# Patient Record
Sex: FEMALE | Race: WHITE | NOT HISPANIC OR LATINO | Employment: OTHER | URBAN - METROPOLITAN AREA
[De-identification: names, ages, dates, MRNs, and addresses within clinical notes are randomized per-mention and may not be internally consistent; named-entity substitution may affect disease eponyms.]

---

## 2018-11-27 ENCOUNTER — TRANSCRIBE ORDERS (OUTPATIENT)
Dept: ADMINISTRATIVE | Facility: HOSPITAL | Age: 63
End: 2018-11-27

## 2018-11-27 DIAGNOSIS — Z12.39 SCREENING BREAST EXAMINATION: Primary | ICD-10-CM

## 2018-12-05 ENCOUNTER — HOSPITAL ENCOUNTER (OUTPATIENT)
Dept: MAMMOGRAPHY | Facility: HOSPITAL | Age: 63
Discharge: HOME | End: 2018-12-05
Attending: FAMILY MEDICINE
Payer: COMMERCIAL

## 2018-12-05 DIAGNOSIS — Z12.39 SCREENING BREAST EXAMINATION: ICD-10-CM

## 2018-12-05 PROCEDURE — 77063 BREAST TOMOSYNTHESIS BI: CPT

## 2018-12-05 PROCEDURE — 77067 SCR MAMMO BI INCL CAD: CPT

## 2019-06-17 ENCOUNTER — TRANSCRIBE ORDERS (OUTPATIENT)
Dept: LAB | Facility: MEDICAL CENTER | Age: 64
End: 2019-06-17

## 2019-06-17 ENCOUNTER — APPOINTMENT (OUTPATIENT)
Dept: LAB | Facility: MEDICAL CENTER | Age: 64
End: 2019-06-17
Payer: COMMERCIAL

## 2019-06-17 DIAGNOSIS — E07.9 THYROID DYSFUNCTION: ICD-10-CM

## 2019-06-17 DIAGNOSIS — E07.9 THYROID DYSFUNCTION: Primary | ICD-10-CM

## 2019-06-17 LAB
T4 FREE SERPL-MCNC: 1.21 NG/DL (ref 0.76–1.46)
TSH SERPL DL<=0.05 MIU/L-ACNC: 0.73 UIU/ML (ref 0.36–3.74)

## 2019-06-17 PROCEDURE — 84443 ASSAY THYROID STIM HORMONE: CPT

## 2019-06-17 PROCEDURE — 84439 ASSAY OF FREE THYROXINE: CPT

## 2019-06-17 PROCEDURE — 36415 COLL VENOUS BLD VENIPUNCTURE: CPT

## 2019-11-18 ENCOUNTER — TRANSCRIBE ORDERS (OUTPATIENT)
Dept: ADMINISTRATIVE | Facility: HOSPITAL | Age: 64
End: 2019-11-18

## 2019-11-18 DIAGNOSIS — Z13.820 SCREENING FOR OSTEOPOROSIS: ICD-10-CM

## 2019-11-18 DIAGNOSIS — Z12.31 SCREENING MAMMOGRAM FOR HIGH-RISK PATIENT: Primary | ICD-10-CM

## 2019-12-09 ENCOUNTER — HOSPITAL ENCOUNTER (OUTPATIENT)
Dept: MAMMOGRAPHY | Facility: HOSPITAL | Age: 64
Discharge: HOME | End: 2019-12-09
Attending: FAMILY MEDICINE
Payer: MEDICARE

## 2019-12-09 ENCOUNTER — HOSPITAL ENCOUNTER (OUTPATIENT)
Dept: BONE DENSITY | Facility: HOSPITAL | Age: 64
Discharge: HOME | End: 2019-12-09
Attending: FAMILY MEDICINE
Payer: MEDICARE

## 2019-12-09 VITALS — HEIGHT: 64 IN | WEIGHT: 140 LBS | BODY MASS INDEX: 23.9 KG/M2

## 2019-12-09 DIAGNOSIS — Z13.820 SCREENING FOR OSTEOPOROSIS: ICD-10-CM

## 2019-12-09 DIAGNOSIS — Z12.31 SCREENING MAMMOGRAM FOR HIGH-RISK PATIENT: ICD-10-CM

## 2019-12-09 PROCEDURE — 77067 SCR MAMMO BI INCL CAD: CPT

## 2019-12-09 PROCEDURE — 77063 BREAST TOMOSYNTHESIS BI: CPT

## 2019-12-09 PROCEDURE — 77080 DXA BONE DENSITY AXIAL: CPT

## 2020-12-11 ENCOUNTER — TRANSCRIBE ORDERS (OUTPATIENT)
Dept: ADMINISTRATIVE | Facility: HOSPITAL | Age: 65
End: 2020-12-11

## 2020-12-11 DIAGNOSIS — Z12.31 ENCOUNTER FOR SCREENING MAMMOGRAM FOR MALIGNANT NEOPLASM OF BREAST: Primary | ICD-10-CM

## 2020-12-15 ENCOUNTER — HOSPITAL ENCOUNTER (OUTPATIENT)
Dept: MAMMOGRAPHY | Facility: HOSPITAL | Age: 65
Discharge: HOME | End: 2020-12-15
Attending: FAMILY MEDICINE
Payer: MEDICARE

## 2020-12-15 VITALS — WEIGHT: 142 LBS | BODY MASS INDEX: 24.24 KG/M2 | HEIGHT: 64 IN

## 2020-12-15 DIAGNOSIS — Z12.31 ENCOUNTER FOR SCREENING MAMMOGRAM FOR MALIGNANT NEOPLASM OF BREAST: ICD-10-CM

## 2020-12-15 PROCEDURE — 77067 SCR MAMMO BI INCL CAD: CPT

## 2020-12-15 PROCEDURE — 77063 BREAST TOMOSYNTHESIS BI: CPT

## 2021-07-14 PROCEDURE — U0005 INFEC AGEN DETEC AMPLI PROBE: HCPCS | Performed by: NURSE PRACTITIONER

## 2021-07-14 PROCEDURE — U0003 INFECTIOUS AGENT DETECTION BY NUCLEIC ACID (DNA OR RNA); SEVERE ACUTE RESPIRATORY SYNDROME CORONAVIRUS 2 (SARS-COV-2) (CORONAVIRUS DISEASE [COVID-19]), AMPLIFIED PROBE TECHNIQUE, MAKING USE OF HIGH THROUGHPUT TECHNOLOGIES AS DESCRIBED BY CMS-2020-01-R: HCPCS | Performed by: NURSE PRACTITIONER

## 2021-11-18 PROCEDURE — U0005 INFEC AGEN DETEC AMPLI PROBE: HCPCS | Performed by: FAMILY MEDICINE

## 2021-11-18 PROCEDURE — U0003 INFECTIOUS AGENT DETECTION BY NUCLEIC ACID (DNA OR RNA); SEVERE ACUTE RESPIRATORY SYNDROME CORONAVIRUS 2 (SARS-COV-2) (CORONAVIRUS DISEASE [COVID-19]), AMPLIFIED PROBE TECHNIQUE, MAKING USE OF HIGH THROUGHPUT TECHNOLOGIES AS DESCRIBED BY CMS-2020-01-R: HCPCS | Performed by: FAMILY MEDICINE

## 2021-12-20 ENCOUNTER — HOSPITAL ENCOUNTER (OUTPATIENT)
Dept: RADIOLOGY | Facility: HOSPITAL | Age: 66
Discharge: HOME | End: 2021-12-20
Attending: FAMILY MEDICINE
Payer: MEDICARE

## 2021-12-20 ENCOUNTER — HOSPITAL ENCOUNTER (OUTPATIENT)
Dept: MAMMOGRAPHY | Facility: HOSPITAL | Age: 66
Discharge: HOME | End: 2021-12-20
Attending: FAMILY MEDICINE
Payer: MEDICARE

## 2021-12-20 ENCOUNTER — HOSPITAL ENCOUNTER (OUTPATIENT)
Dept: BONE DENSITY | Facility: HOSPITAL | Age: 66
Discharge: HOME | End: 2021-12-20
Attending: FAMILY MEDICINE
Payer: MEDICARE

## 2021-12-20 VITALS — BODY MASS INDEX: 23.9 KG/M2 | WEIGHT: 140 LBS | HEIGHT: 64 IN

## 2021-12-20 DIAGNOSIS — Z13.820 OSTEOPOROSIS SCREENING: ICD-10-CM

## 2021-12-20 DIAGNOSIS — M46.1 BILATERAL SACROILIITIS (HCC): ICD-10-CM

## 2021-12-20 DIAGNOSIS — Z12.31 ENCOUNTER FOR SCREENING MAMMOGRAM FOR MALIGNANT NEOPLASM OF BREAST: ICD-10-CM

## 2021-12-20 PROCEDURE — 72202 X-RAY EXAM SI JOINTS 3/> VWS: CPT

## 2021-12-20 PROCEDURE — 72110 X-RAY EXAM L-2 SPINE 4/>VWS: CPT

## 2021-12-20 PROCEDURE — 77067 SCR MAMMO BI INCL CAD: CPT

## 2021-12-20 PROCEDURE — 77080 DXA BONE DENSITY AXIAL: CPT

## 2021-12-20 PROCEDURE — 77063 BREAST TOMOSYNTHESIS BI: CPT

## 2022-07-26 ENCOUNTER — OFFICE VISIT (OUTPATIENT)
Dept: URGENT CARE | Facility: MEDICAL CENTER | Age: 67
End: 2022-07-26
Payer: MEDICARE

## 2022-07-26 VITALS
TEMPERATURE: 98.6 F | OXYGEN SATURATION: 100 % | HEIGHT: 64 IN | SYSTOLIC BLOOD PRESSURE: 130 MMHG | BODY MASS INDEX: 24.41 KG/M2 | DIASTOLIC BLOOD PRESSURE: 70 MMHG | RESPIRATION RATE: 18 BRPM | WEIGHT: 143 LBS | HEART RATE: 79 BPM

## 2022-07-26 DIAGNOSIS — N39.0 URINARY TRACT INFECTION WITHOUT HEMATURIA, SITE UNSPECIFIED: Primary | ICD-10-CM

## 2022-07-26 DIAGNOSIS — R39.9 UTI SYMPTOMS: ICD-10-CM

## 2022-07-26 LAB
SL AMB  POCT GLUCOSE, UA: ABNORMAL
SL AMB LEUKOCYTE ESTERASE,UA: ABNORMAL
SL AMB POCT BILIRUBIN,UA: ABNORMAL
SL AMB POCT BLOOD,UA: ABNORMAL
SL AMB POCT CLARITY,UA: ABNORMAL
SL AMB POCT COLOR,UA: YELLOW
SL AMB POCT KETONES,UA: ABNORMAL
SL AMB POCT NITRITE,UA: ABNORMAL
SL AMB POCT PH,UA: 6.5
SL AMB POCT SPECIFIC GRAVITY,UA: 1
SL AMB POCT URINE PROTEIN: ABNORMAL
SL AMB POCT UROBILINOGEN: 0.2

## 2022-07-26 PROCEDURE — 81002 URINALYSIS NONAUTO W/O SCOPE: CPT | Performed by: PHYSICIAN ASSISTANT

## 2022-07-26 PROCEDURE — G0463 HOSPITAL OUTPT CLINIC VISIT: HCPCS | Performed by: PHYSICIAN ASSISTANT

## 2022-07-26 PROCEDURE — 87086 URINE CULTURE/COLONY COUNT: CPT | Performed by: PHYSICIAN ASSISTANT

## 2022-07-26 PROCEDURE — 99212 OFFICE O/P EST SF 10 MIN: CPT | Performed by: PHYSICIAN ASSISTANT

## 2022-07-26 RX ORDER — LEVOTHYROXINE SODIUM 0.1 MG/1
TABLET ORAL
COMMUNITY
Start: 2022-06-21

## 2022-07-26 RX ORDER — LISINOPRIL 10 MG/1
10 TABLET ORAL DAILY
COMMUNITY

## 2022-07-26 RX ORDER — CEPHALEXIN 500 MG/1
500 CAPSULE ORAL EVERY 8 HOURS SCHEDULED
Qty: 21 CAPSULE | Refills: 0 | Status: SHIPPED | OUTPATIENT
Start: 2022-07-26 | End: 2022-08-02

## 2022-07-26 RX ORDER — ATENOLOL 50 MG/1
50 TABLET ORAL 2 TIMES DAILY
COMMUNITY

## 2022-07-26 NOTE — PROGRESS NOTES
[facility]        NAME: Smith Barnhart is a 75 y o  female  : 1955    MRN: [id number]  DATE: 2022  TIME: 7:09 PM    Assessment and Plan   Urinary tract infection without hematuria, site unspecified [N39 0]  1  Urinary tract infection without hematuria, site unspecified  cephalexin (KEFLEX) 500 mg capsule   2  UTI symptoms  POCT urine dip    Urine culture         Patient Instructions       Follow up with PCP in 3-5 days  Proceed to  ER if symptoms worsen  Chief Complaint     Chief Complaint   Patient presents with    Possible UTI     Pressure and frequency that started yesterday          History of Present Illness       Patient started with urinary pressure and frequency which started last night  Symptoms worsened today  No flank pain abdominal pain nausea vomiting fever or chills  She has never had a UTI in the past       Review of Systems   Review of Systems   Constitutional: Negative for chills and fever  Gastrointestinal: Negative for abdominal pain  Genitourinary: Positive for dysuria (Pressure), frequency and urgency  Negative for flank pain  Musculoskeletal: Negative for back pain           Current Medications       Current Outpatient Medications:     atenolol (TENORMIN) 50 mg tablet, Take 50 mg by mouth 2 (two) times a day, Disp: , Rfl:     cephalexin (KEFLEX) 500 mg capsule, Take 1 capsule (500 mg total) by mouth every 8 (eight) hours for 7 days, Disp: 21 capsule, Rfl: 0    levothyroxine 100 mcg tablet, take 1 tablet by mouth 6 days  PER WEEK, 1/2 TABELT ONE DAY A WEEK, Disp: , Rfl:     lisinopril (ZESTRIL) 10 mg tablet, Take 10 mg by mouth daily, Disp: , Rfl:     Current Allergies     Allergies as of 2022 - Reviewed 2022   Allergen Reaction Noted    Ciprofloxacin Swelling 2019    Erythromycin  2019    Penicillins Swelling 2019            The following portions of the patient's history were reviewed and updated as appropriate: allergies, current medications, past family history, past medical history, past social history, past surgical history and problem list      Past Medical History:   Diagnosis Date    Disease of thyroid gland     Hypertension        Past Surgical History:   Procedure Laterality Date    HYSTERECTOMY         Family History   Problem Relation Age of Onset    Acute lymphoblastic leukemia Mother     Lung cancer Father     No Known Problems Daughter     No Known Problems Maternal Grandmother     No Known Problems Paternal Grandmother     No Known Problems Maternal Aunt     No Known Problems Paternal Aunt     Breast cancer Cousin     No Known Problems Maternal Grandfather     No Known Problems Paternal Grandfather     No Known Problems Son          Medications have been verified  Objective   /70   Pulse 79   Temp 98 6 °F (37 °C)   Resp 18   Ht 5' 4" (1 626 m)   Wt 64 9 kg (143 lb)   SpO2 100%   BMI 24 55 kg/m²   No LMP recorded  Patient has had a hysterectomy  Physical Exam     Physical Exam  Vitals and nursing note reviewed  Constitutional:       Appearance: Normal appearance  HENT:      Head: Normocephalic and atraumatic  Cardiovascular:      Rate and Rhythm: Normal rate and regular rhythm  Pulmonary:      Effort: Pulmonary effort is normal    Skin:     General: Skin is warm  Neurological:      Mental Status: She is alert

## 2022-07-28 LAB — BACTERIA UR CULT: NORMAL

## 2022-08-01 ENCOUNTER — TELEPHONE (OUTPATIENT)
Dept: URGENT CARE | Facility: MEDICAL CENTER | Age: 67
End: 2022-08-01

## 2022-08-01 NOTE — TELEPHONE ENCOUNTER
Patient called and left a message requesting urine culture results  Called patient and left a message

## 2022-08-01 NOTE — TELEPHONE ENCOUNTER
Patient came to urgent care to review urine culture results  Discussed with patient  Recommended completing course based on symptoms  States symptoms are improving

## 2022-09-20 ENCOUNTER — OFFICE VISIT (OUTPATIENT)
Dept: URGENT CARE | Facility: MEDICAL CENTER | Age: 67
End: 2022-09-20
Payer: MEDICARE

## 2022-09-20 VITALS
RESPIRATION RATE: 18 BRPM | OXYGEN SATURATION: 99 % | BODY MASS INDEX: 24.41 KG/M2 | TEMPERATURE: 97.9 F | HEIGHT: 64 IN | HEART RATE: 53 BPM | WEIGHT: 143 LBS

## 2022-09-20 DIAGNOSIS — R05.1 ACUTE COUGH: ICD-10-CM

## 2022-09-20 DIAGNOSIS — U07.1 COVID-19: Primary | ICD-10-CM

## 2022-09-20 LAB
SARS-COV-2 AG UPPER RESP QL IA: POSITIVE
VALID CONTROL: ABNORMAL

## 2022-09-20 PROCEDURE — G0463 HOSPITAL OUTPT CLINIC VISIT: HCPCS | Performed by: PHYSICIAN ASSISTANT

## 2022-09-20 PROCEDURE — 87811 SARS-COV-2 COVID19 W/OPTIC: CPT | Performed by: PHYSICIAN ASSISTANT

## 2022-09-20 PROCEDURE — 99212 OFFICE O/P EST SF 10 MIN: CPT | Performed by: PHYSICIAN ASSISTANT

## 2022-09-20 RX ORDER — BENZONATATE 200 MG/1
200 CAPSULE ORAL 3 TIMES DAILY PRN
Qty: 20 CAPSULE | Refills: 0 | Status: SHIPPED | OUTPATIENT
Start: 2022-09-20

## 2022-09-20 NOTE — PATIENT INSTRUCTIONS
Quarantine  Vitamin D3 2000 International Units daily  Rest  Drink plenty of fluids  Follow-up with family doctor  If symptoms worsen go to the emergency room for further evaluation

## 2022-09-20 NOTE — PROGRESS NOTES
[facility]        NAME: Huyen Vides is a 75 y o  female  : 1955    MRN: [id number]  DATE: 2022  TIME: 6:47 PM    Assessment and Plan   COVID-19 [U07 1]  1  COVID-19  benzonatate (TESSALON) 200 MG capsule   2  Acute cough  Poct Covid 19 Rapid Antigen Test         Patient Instructions       Follow up with PCP in 3-5 days  Proceed to  ER if symptoms worsen  Chief Complaint     Chief Complaint   Patient presents with    COVID-19     Covid positive took home test today , cough x 1 week and fatigue           History of Present Illness       Patient presents a one-week history of cough and fatigue  She did home COVID test which was positive today  Symptoms have improved the last few days  She is vaccinated for COVID   tested he was negative  Patient is fully vaccinated      Review of Systems   Review of Systems   Constitutional: Positive for fatigue  Negative for fever  HENT: Positive for congestion  Negative for rhinorrhea and sore throat  Respiratory: Positive for cough  Negative for shortness of breath  Gastrointestinal: Negative for diarrhea, nausea and vomiting  Musculoskeletal: Positive for myalgias  Skin: Negative for rash  Neurological: Positive for headaches           Current Medications       Current Outpatient Medications:     atenolol (TENORMIN) 50 mg tablet, Take 50 mg by mouth 2 (two) times a day, Disp: , Rfl:     benzonatate (TESSALON) 200 MG capsule, Take 1 capsule (200 mg total) by mouth 3 (three) times a day as needed for cough, Disp: 20 capsule, Rfl: 0    levothyroxine 100 mcg tablet, take 1 tablet by mouth 6 days  PER WEEK, 1/2 TABELT ONE DAY A WEEK, Disp: , Rfl:     lisinopril (ZESTRIL) 10 mg tablet, Take 10 mg by mouth daily, Disp: , Rfl:     Current Allergies     Allergies as of 2022 - Reviewed 2022   Allergen Reaction Noted    Ciprofloxacin Swelling 2019    Erythromycin  2019    Penicillins Swelling 12/09/2019            The following portions of the patient's history were reviewed and updated as appropriate: allergies, current medications, past family history, past medical history, past social history, past surgical history and problem list      Past Medical History:   Diagnosis Date    Disease of thyroid gland     Hypertension        Past Surgical History:   Procedure Laterality Date    HYSTERECTOMY         Family History   Problem Relation Age of Onset    Acute lymphoblastic leukemia Mother     Lung cancer Father     No Known Problems Daughter     No Known Problems Maternal Grandmother     No Known Problems Paternal Grandmother     No Known Problems Maternal Aunt     No Known Problems Paternal Aunt     Breast cancer Cousin     No Known Problems Maternal Grandfather     No Known Problems Paternal Grandfather     No Known Problems Son          Medications have been verified  Objective   Pulse (!) 53   Temp 97 9 °F (36 6 °C)   Resp 18   Ht 5' 4" (1 626 m)   Wt 64 9 kg (143 lb)   SpO2 99%   BMI 24 55 kg/m²   No LMP recorded  Patient has had a hysterectomy  Physical Exam     Physical Exam  Vitals and nursing note reviewed  Constitutional:       Appearance: Normal appearance  HENT:      Head: Normocephalic and atraumatic  Right Ear: Tympanic membrane normal       Left Ear: Tympanic membrane normal       Mouth/Throat:      Mouth: Mucous membranes are moist       Pharynx: Oropharynx is clear  Cardiovascular:      Rate and Rhythm: Normal rate and regular rhythm  Heart sounds: Normal heart sounds  Pulmonary:      Effort: Pulmonary effort is normal       Breath sounds: Normal breath sounds  Skin:     General: Skin is warm  Neurological:      Mental Status: She is alert

## 2022-12-23 ENCOUNTER — HOSPITAL ENCOUNTER (OUTPATIENT)
Dept: MAMMOGRAPHY | Facility: HOSPITAL | Age: 67
End: 2022-12-23
Attending: FAMILY MEDICINE

## 2022-12-23 VITALS — BODY MASS INDEX: 24.41 KG/M2 | WEIGHT: 143 LBS | HEIGHT: 64 IN

## 2022-12-23 DIAGNOSIS — Z12.31 ENCOUNTER FOR SCREENING MAMMOGRAM FOR MALIGNANT NEOPLASM OF BREAST: ICD-10-CM

## 2023-01-09 ENCOUNTER — APPOINTMENT (OUTPATIENT)
Dept: RADIOLOGY | Facility: CLINIC | Age: 68
End: 2023-01-09

## 2023-01-09 ENCOUNTER — APPOINTMENT (OUTPATIENT)
Dept: LAB | Facility: MEDICAL CENTER | Age: 68
End: 2023-01-09

## 2023-01-09 DIAGNOSIS — Z86.39 HISTORY OF HYPOKALEMIA: ICD-10-CM

## 2023-01-09 DIAGNOSIS — R22.42 LOCALIZED SWELLING OF LEFT FOOT: ICD-10-CM

## 2023-01-09 DIAGNOSIS — M79.672 LEFT FOOT PAIN: ICD-10-CM

## 2023-01-09 LAB
ANION GAP SERPL CALCULATED.3IONS-SCNC: 6 MMOL/L (ref 4–13)
B BURGDOR IGG+IGM SER-ACNC: <0.2 AI
BASOPHILS # BLD AUTO: 0.05 THOUSANDS/ÂΜL (ref 0–0.1)
BASOPHILS NFR BLD AUTO: 1 % (ref 0–1)
BUN SERPL-MCNC: 17 MG/DL (ref 5–25)
CALCIUM SERPL-MCNC: 9.9 MG/DL (ref 8.3–10.1)
CHLORIDE SERPL-SCNC: 106 MMOL/L (ref 96–108)
CO2 SERPL-SCNC: 27 MMOL/L (ref 21–32)
CREAT SERPL-MCNC: 1.03 MG/DL (ref 0.6–1.3)
CRP SERPL QL: 11.5 MG/L
EOSINOPHIL # BLD AUTO: 0.16 THOUSAND/ÂΜL (ref 0–0.61)
EOSINOPHIL NFR BLD AUTO: 3 % (ref 0–6)
ERYTHROCYTE [DISTWIDTH] IN BLOOD BY AUTOMATED COUNT: 14.1 % (ref 11.6–15.1)
ERYTHROCYTE [SEDIMENTATION RATE] IN BLOOD: 9 MM/HOUR (ref 0–29)
GFR SERPL CREATININE-BSD FRML MDRD: 55 ML/MIN/1.73SQ M
GLUCOSE P FAST SERPL-MCNC: 109 MG/DL (ref 65–99)
HCT VFR BLD AUTO: 37.4 % (ref 34.8–46.1)
HGB BLD-MCNC: 13.3 G/DL (ref 11.5–15.4)
IMM GRANULOCYTES # BLD AUTO: 0.02 THOUSAND/UL (ref 0–0.2)
IMM GRANULOCYTES NFR BLD AUTO: 0 % (ref 0–2)
LYMPHOCYTES # BLD AUTO: 1.85 THOUSANDS/ÂΜL (ref 0.6–4.47)
LYMPHOCYTES NFR BLD AUTO: 31 % (ref 14–44)
MCH RBC QN AUTO: 34.6 PG (ref 26.8–34.3)
MCHC RBC AUTO-ENTMCNC: 35.6 G/DL (ref 31.4–37.4)
MCV RBC AUTO: 97 FL (ref 82–98)
MONOCYTES # BLD AUTO: 0.39 THOUSAND/ÂΜL (ref 0.17–1.22)
MONOCYTES NFR BLD AUTO: 7 % (ref 4–12)
NEUTROPHILS # BLD AUTO: 3.43 THOUSANDS/ÂΜL (ref 1.85–7.62)
NEUTS SEG NFR BLD AUTO: 58 % (ref 43–75)
NRBC BLD AUTO-RTO: 0 /100 WBCS
PLATELET # BLD AUTO: 82 THOUSANDS/UL (ref 149–390)
PMV BLD AUTO: 12.4 FL (ref 8.9–12.7)
POTASSIUM SERPL-SCNC: 4.4 MMOL/L (ref 3.5–5.3)
RBC # BLD AUTO: 3.84 MILLION/UL (ref 3.81–5.12)
SODIUM SERPL-SCNC: 139 MMOL/L (ref 135–147)
URATE SERPL-MCNC: 8.5 MG/DL (ref 2–7.5)
WBC # BLD AUTO: 5.9 THOUSAND/UL (ref 4.31–10.16)

## 2023-03-02 ENCOUNTER — OFFICE VISIT (OUTPATIENT)
Dept: URGENT CARE | Facility: MEDICAL CENTER | Age: 68
End: 2023-03-02

## 2023-03-02 VITALS
HEIGHT: 63 IN | TEMPERATURE: 97.7 F | WEIGHT: 139.2 LBS | OXYGEN SATURATION: 98 % | RESPIRATION RATE: 18 BRPM | BODY MASS INDEX: 24.66 KG/M2 | DIASTOLIC BLOOD PRESSURE: 82 MMHG | HEART RATE: 62 BPM | SYSTOLIC BLOOD PRESSURE: 130 MMHG

## 2023-03-02 DIAGNOSIS — R05.1 ACUTE COUGH: ICD-10-CM

## 2023-03-02 DIAGNOSIS — J01.90 ACUTE SINUSITIS, RECURRENCE NOT SPECIFIED, UNSPECIFIED LOCATION: Primary | ICD-10-CM

## 2023-03-02 RX ORDER — BENZONATATE 200 MG/1
200 CAPSULE ORAL 3 TIMES DAILY PRN
Qty: 20 CAPSULE | Refills: 0 | Status: SHIPPED | OUTPATIENT
Start: 2023-03-02

## 2023-03-02 RX ORDER — METHYLPREDNISOLONE 4 MG/1
TABLET ORAL
Qty: 1 EACH | Refills: 0 | Status: SHIPPED | OUTPATIENT
Start: 2023-03-02 | End: 2023-03-02

## 2023-03-02 RX ORDER — FLUTICASONE PROPIONATE 50 MCG
1 SPRAY, SUSPENSION (ML) NASAL DAILY
Qty: 11.1 ML | Refills: 0 | Status: SHIPPED | OUTPATIENT
Start: 2023-03-02

## 2023-03-02 RX ORDER — METHYLPREDNISOLONE 4 MG/1
TABLET ORAL
Qty: 21 TABLET | Refills: 0 | Status: SHIPPED | OUTPATIENT
Start: 2023-03-02 | End: 2023-03-08

## 2023-03-02 NOTE — PATIENT INSTRUCTIONS
You make take Over the Counter Tylenol (Acetaminophen) and/or Motrin (Ibuprofen) as needed, as directed on packaging  Be sure to get plenty of rest, and drinking fluids to remain hydrated  For your sore throat you can use over the counter Chloraseptic Spray & salt water gargles for pain relief  Over the counter decongestants can be used, only as directed on the box  However if you have any history of cardiac disease or high blood pressure these should be avoided, as we caution the use of these since they can make place strain on your heart and cause increase in blood pressure  It is recommended in lieu of decongestants to use cool mist vaporizer, saline nasal spray to relieve nasal congestion  It is also important to remain hydrated and drink plenty of fluids (avoiding caffeine and alcohol)  The unnecessary use of antibiotics can have harmful affect, unwanted side-effects and can lead to antibiotic resistant bacteria in the future  You are being treated today for a viral illness  Viral illnesses do not require antibiotics, and are treated symptomatically  According to the Centers for Disease Control and Prevention, about one-third of antibiotic use in the outpatient setting, is not needed nor appropriate  Antibiotics treat infections caused by bacteria  But they don't treat infections caused by viruses (viral infections)  For example, an antibiotic is the correct treatment for strep throat, which is caused by bacteria  But it's not the right treatment for most sore throats, which are caused by viruses  By being proactive and treating your individual symptoms, this may help you feel better  You may have had testing done today which when completed and resulted may change the course of your treatment  It is at that time that if a change in your treatment is necessary that you will hear from our office  I would also recommend you follow up with your primary care provider in the next few days

## 2023-03-02 NOTE — PROGRESS NOTES
[facility]        NAME: Audrey Hull is a 75 y o  female  : 1955    MRN: [id number]  DATE: 2023  TIME: 12:41 PM    Assessment and Plan   Acute sinusitis, recurrence not specified, unspecified location [J01 90]  1  Acute sinusitis, recurrence not specified, unspecified location  methylPREDNISolone 4 MG tablet therapy pack    fluticasone (FLONASE) 50 mcg/act nasal spray      2  Acute cough  benzonatate (TESSALON) 200 MG capsule    methylPREDNISolone 4 MG tablet therapy pack            Patient Instructions       Follow up with PCP in 3-5 days  Proceed to  ER if symptoms worsen  Chief Complaint     Chief Complaint   Patient presents with   • Cold Like Symptoms     Pt started last week with exhaustion and sneezing  Then started with a sore throat, cough, and sinus feel full x 5 days  Tylenol and Robitussin taken for symptoms  History of Present Illness       Patient started with symptoms on Friday  She was doing cleaning in a dirty laundry area, and then also was painting which was irritating  Patient haivng sinus congestion, fatigue, cough, and did have a sore throat which has subsided  She was recently seen by PCP for routine visit  Review of Systems   Review of Systems   Constitutional: Positive for activity change and fatigue  Negative for chills and fever  HENT: Positive for congestion, postnasal drip, rhinorrhea, sinus pressure and sinus pain  Negative for dental problem, drooling, ear pain, sore throat and trouble swallowing  Eyes: Negative for pain and visual disturbance  Respiratory: Positive for cough  Negative for shortness of breath  Cough worse when lying down at night  Cardiovascular: Negative for chest pain and palpitations  Gastrointestinal: Negative for abdominal pain, constipation, diarrhea, nausea and vomiting  Genitourinary: Negative for dysuria and hematuria  Musculoskeletal: Negative for arthralgias and back pain     Skin: Negative for color change and rash  Neurological: Negative for dizziness, seizures, syncope, light-headedness and headaches  All other systems reviewed and are negative          Current Medications       Current Outpatient Medications:   •  atenolol (TENORMIN) 50 mg tablet, Take 50 mg by mouth 2 (two) times a day, Disp: , Rfl:   •  benzonatate (TESSALON) 200 MG capsule, Take 1 capsule (200 mg total) by mouth 3 (three) times a day as needed for cough, Disp: 20 capsule, Rfl: 0  •  fluticasone (FLONASE) 50 mcg/act nasal spray, 1 spray into each nostril daily, Disp: 11 1 mL, Rfl: 0  •  levothyroxine 100 mcg tablet, take 1 tablet by mouth 6 days  PER WEEK, 1/2 TABELT ONE DAY A WEEK, Disp: , Rfl:   •  lisinopril (ZESTRIL) 10 mg tablet, Take 10 mg by mouth daily, Disp: , Rfl:   •  methylPREDNISolone 4 MG tablet therapy pack, Use as directed on package, Disp: 1 each, Rfl: 0    Current Allergies     Allergies as of 03/02/2023 - Reviewed 03/02/2023   Allergen Reaction Noted   • Ciprofloxacin Swelling 12/09/2019   • Erythromycin  12/09/2019   • Penicillins Swelling 12/09/2019            The following portions of the patient's history were reviewed and updated as appropriate: allergies, current medications, past family history, past medical history, past social history, past surgical history and problem list      Past Medical History:   Diagnosis Date   • Disease of thyroid gland    • Hypertension        Past Surgical History:   Procedure Laterality Date   • HYSTERECTOMY         Family History   Problem Relation Age of Onset   • Acute lymphoblastic leukemia Mother    • Lung cancer Father    • No Known Problems Daughter    • No Known Problems Maternal Grandmother    • No Known Problems Paternal Grandmother    • No Known Problems Maternal Aunt    • No Known Problems Paternal Aunt    • Breast cancer Cousin    • No Known Problems Maternal Grandfather    • No Known Problems Paternal Grandfather    • No Known Problems Son Medications have been verified  Objective   /82   Pulse 62   Temp 97 7 °F (36 5 °C)   Resp 18   Ht 5' 3" (1 6 m)   Wt 63 1 kg (139 lb 3 2 oz)   SpO2 98%   BMI 24 66 kg/m²        Physical Exam     Physical Exam  Vitals and nursing note reviewed  Constitutional:       General: She is not in acute distress  Appearance: Normal appearance  She is well-developed, well-groomed and normal weight  She is not ill-appearing  HENT:      Head: Normocephalic and atraumatic  Jaw: There is normal jaw occlusion  Comments: Denies any pain into jaw/teeth  Right Ear: Tympanic membrane, ear canal and external ear normal       Left Ear: Tympanic membrane, ear canal and external ear normal       Nose: Congestion and rhinorrhea present  Rhinorrhea is clear and purulent  Mouth/Throat:      Lips: Pink  Mouth: Mucous membranes are moist       Pharynx: Oropharynx is clear  Uvula midline  No pharyngeal swelling, oropharyngeal exudate, posterior oropharyngeal erythema or uvula swelling  Tonsils: No tonsillar exudate or tonsillar abscesses  0 on the right  0 on the left  Comments: Post nasal drip noted on exam   Eyes:      General: Lids are normal       Extraocular Movements: Extraocular movements intact  Conjunctiva/sclera: Conjunctivae normal       Pupils: Pupils are equal, round, and reactive to light  Neck:      Trachea: Trachea and phonation normal    Cardiovascular:      Rate and Rhythm: Normal rate and regular rhythm  Pulses: Normal pulses  Heart sounds: Normal heart sounds, S1 normal and S2 normal    Pulmonary:      Effort: Pulmonary effort is normal       Breath sounds: No decreased air movement  Rhonchi present  No decreased breath sounds or wheezing  Comments: Very slight rhonchi noted  Abdominal:      General: Abdomen is flat  Bowel sounds are normal       Palpations: Abdomen is soft     Musculoskeletal:         General: Normal range of motion  Cervical back: Normal range of motion and neck supple  Lymphadenopathy:      Cervical: No cervical adenopathy  Skin:     General: Skin is warm and dry  Capillary Refill: Capillary refill takes less than 2 seconds  Neurological:      General: No focal deficit present  Mental Status: She is alert and oriented to person, place, and time  GCS: GCS eye subscore is 4  GCS verbal subscore is 5  GCS motor subscore is 6  Psychiatric:         Mood and Affect: Mood normal          Behavior: Behavior normal  Behavior is cooperative

## 2023-09-12 ENCOUNTER — APPOINTMENT (OUTPATIENT)
Dept: LAB | Facility: MEDICAL CENTER | Age: 68
End: 2023-09-12
Payer: MEDICARE

## 2023-09-12 DIAGNOSIS — D69.3 AUTOIMMUNE THROMBOCYTOPENIA (HCC): ICD-10-CM

## 2023-09-12 LAB
BASOPHILS # BLD AUTO: 0.04 THOUSANDS/ÂΜL (ref 0–0.1)
BASOPHILS NFR BLD AUTO: 1 % (ref 0–1)
EOSINOPHIL # BLD AUTO: 0.12 THOUSAND/ÂΜL (ref 0–0.61)
EOSINOPHIL NFR BLD AUTO: 2 % (ref 0–6)
ERYTHROCYTE [DISTWIDTH] IN BLOOD BY AUTOMATED COUNT: 13 % (ref 11.6–15.1)
HCT VFR BLD AUTO: 39.5 % (ref 34.8–46.1)
HGB BLD-MCNC: 12.8 G/DL (ref 11.5–15.4)
IMM GRANULOCYTES # BLD AUTO: 0.04 THOUSAND/UL (ref 0–0.2)
IMM GRANULOCYTES NFR BLD AUTO: 1 % (ref 0–2)
LYMPHOCYTES # BLD AUTO: 2.05 THOUSANDS/ÂΜL (ref 0.6–4.47)
LYMPHOCYTES NFR BLD AUTO: 34 % (ref 14–44)
MCH RBC QN AUTO: 30.6 PG (ref 26.8–34.3)
MCHC RBC AUTO-ENTMCNC: 32.4 G/DL (ref 31.4–37.4)
MCV RBC AUTO: 95 FL (ref 82–98)
MONOCYTES # BLD AUTO: 0.46 THOUSAND/ÂΜL (ref 0.17–1.22)
MONOCYTES NFR BLD AUTO: 8 % (ref 4–12)
NEUTROPHILS # BLD AUTO: 3.41 THOUSANDS/ÂΜL (ref 1.85–7.62)
NEUTS SEG NFR BLD AUTO: 54 % (ref 43–75)
NRBC BLD AUTO-RTO: 0 /100 WBCS
PLATELET # BLD AUTO: 66 THOUSANDS/UL (ref 149–390)
PMV BLD AUTO: 12.2 FL (ref 8.9–12.7)
RBC # BLD AUTO: 4.18 MILLION/UL (ref 3.81–5.12)
WBC # BLD AUTO: 6.12 THOUSAND/UL (ref 4.31–10.16)

## 2023-09-12 PROCEDURE — 85025 COMPLETE CBC W/AUTO DIFF WBC: CPT

## 2023-09-12 PROCEDURE — 36415 COLL VENOUS BLD VENIPUNCTURE: CPT

## 2023-09-27 ENCOUNTER — APPOINTMENT (OUTPATIENT)
Dept: LAB | Facility: MEDICAL CENTER | Age: 68
End: 2023-09-27
Payer: MEDICARE

## 2023-09-27 DIAGNOSIS — D69.6 THROMBOCYTOPENIA (HCC): ICD-10-CM

## 2023-09-27 LAB
BASOPHILS # BLD AUTO: 0.04 THOUSANDS/ÂΜL (ref 0–0.1)
BASOPHILS NFR BLD AUTO: 1 % (ref 0–1)
EOSINOPHIL # BLD AUTO: 0.17 THOUSAND/ÂΜL (ref 0–0.61)
EOSINOPHIL NFR BLD AUTO: 3 % (ref 0–6)
ERYTHROCYTE [DISTWIDTH] IN BLOOD BY AUTOMATED COUNT: 13.9 % (ref 11.6–15.1)
HCT VFR BLD AUTO: 35.5 % (ref 34.8–46.1)
HGB BLD-MCNC: 12.9 G/DL (ref 11.5–15.4)
IMM GRANULOCYTES # BLD AUTO: 0.01 THOUSAND/UL (ref 0–0.2)
IMM GRANULOCYTES NFR BLD AUTO: 0 % (ref 0–2)
LYMPHOCYTES # BLD AUTO: 2.16 THOUSANDS/ÂΜL (ref 0.6–4.47)
LYMPHOCYTES NFR BLD AUTO: 41 % (ref 14–44)
MCH RBC QN AUTO: 36.2 PG (ref 26.8–34.3)
MCHC RBC AUTO-ENTMCNC: 36.3 G/DL (ref 31.4–37.4)
MCV RBC AUTO: 100 FL (ref 82–98)
MONOCYTES # BLD AUTO: 0.42 THOUSAND/ÂΜL (ref 0.17–1.22)
MONOCYTES NFR BLD AUTO: 8 % (ref 4–12)
NEUTROPHILS # BLD AUTO: 2.45 THOUSANDS/ÂΜL (ref 1.85–7.62)
NEUTS SEG NFR BLD AUTO: 47 % (ref 43–75)
NRBC BLD AUTO-RTO: 0 /100 WBCS
PLATELET # BLD AUTO: 82 THOUSANDS/UL (ref 149–390)
PMV BLD AUTO: 11.8 FL (ref 8.9–12.7)
RBC # BLD AUTO: 3.56 MILLION/UL (ref 3.81–5.12)
WBC # BLD AUTO: 5.25 THOUSAND/UL (ref 4.31–10.16)

## 2023-09-27 PROCEDURE — 85025 COMPLETE CBC W/AUTO DIFF WBC: CPT

## 2023-09-27 PROCEDURE — 36415 COLL VENOUS BLD VENIPUNCTURE: CPT

## 2023-10-30 ENCOUNTER — APPOINTMENT (OUTPATIENT)
Dept: LAB | Facility: MEDICAL CENTER | Age: 68
End: 2023-10-30
Payer: MEDICARE

## 2023-10-30 DIAGNOSIS — D69.6 THROMBOCYTOPENIA, UNSPECIFIED (HCC): ICD-10-CM

## 2023-10-30 LAB
BASOPHILS # BLD AUTO: 0.05 THOUSANDS/ÂΜL (ref 0–0.1)
BASOPHILS NFR BLD AUTO: 1 % (ref 0–1)
EOSINOPHIL # BLD AUTO: 0.24 THOUSAND/ÂΜL (ref 0–0.61)
EOSINOPHIL NFR BLD AUTO: 3 % (ref 0–6)
ERYTHROCYTE [DISTWIDTH] IN BLOOD BY AUTOMATED COUNT: 13.3 % (ref 11.6–15.1)
HCT VFR BLD AUTO: 39.5 % (ref 34.8–46.1)
HGB BLD-MCNC: 13.2 G/DL (ref 11.5–15.4)
IMM GRANULOCYTES # BLD AUTO: 0.03 THOUSAND/UL (ref 0–0.2)
IMM GRANULOCYTES NFR BLD AUTO: 0 % (ref 0–2)
LYMPHOCYTES # BLD AUTO: 2.97 THOUSANDS/ÂΜL (ref 0.6–4.47)
LYMPHOCYTES NFR BLD AUTO: 39 % (ref 14–44)
MCH RBC QN AUTO: 31.1 PG (ref 26.8–34.3)
MCHC RBC AUTO-ENTMCNC: 33.4 G/DL (ref 31.4–37.4)
MCV RBC AUTO: 93 FL (ref 82–98)
MONOCYTES # BLD AUTO: 0.53 THOUSAND/ÂΜL (ref 0.17–1.22)
MONOCYTES NFR BLD AUTO: 7 % (ref 4–12)
NEUTROPHILS # BLD AUTO: 3.8 THOUSANDS/ÂΜL (ref 1.85–7.62)
NEUTS SEG NFR BLD AUTO: 50 % (ref 43–75)
NRBC BLD AUTO-RTO: 0 /100 WBCS
PLATELET # BLD AUTO: 93 THOUSANDS/UL (ref 149–390)
PMV BLD AUTO: 13.4 FL (ref 8.9–12.7)
RBC # BLD AUTO: 4.24 MILLION/UL (ref 3.81–5.12)
WBC # BLD AUTO: 7.62 THOUSAND/UL (ref 4.31–10.16)

## 2023-10-30 PROCEDURE — 36415 COLL VENOUS BLD VENIPUNCTURE: CPT

## 2023-10-30 PROCEDURE — 85025 COMPLETE CBC W/AUTO DIFF WBC: CPT

## 2023-12-12 PROBLEM — I10 ESSENTIAL HYPERTENSION: Status: ACTIVE | Noted: 2023-12-12

## 2023-12-12 PROBLEM — D69.6 THROMBOCYTOPENIA (HCC): Status: ACTIVE | Noted: 2023-12-12

## 2023-12-12 PROBLEM — R16.1 SPLENOMEGALY: Status: ACTIVE | Noted: 2022-06-27

## 2023-12-12 PROBLEM — R76.8 ANA POSITIVE: Status: ACTIVE | Noted: 2022-06-27

## 2023-12-18 ENCOUNTER — OFFICE VISIT (OUTPATIENT)
Dept: FAMILY MEDICINE CLINIC | Facility: CLINIC | Age: 68
End: 2023-12-18
Payer: MEDICARE

## 2023-12-18 VITALS
BODY MASS INDEX: 24.27 KG/M2 | OXYGEN SATURATION: 98 % | TEMPERATURE: 99 F | DIASTOLIC BLOOD PRESSURE: 78 MMHG | HEIGHT: 63 IN | SYSTOLIC BLOOD PRESSURE: 110 MMHG | WEIGHT: 137 LBS | HEART RATE: 69 BPM

## 2023-12-18 DIAGNOSIS — Z23 ENCOUNTER FOR IMMUNIZATION: ICD-10-CM

## 2023-12-18 DIAGNOSIS — Z00.00 MEDICARE ANNUAL WELLNESS VISIT, SUBSEQUENT: Primary | ICD-10-CM

## 2023-12-18 DIAGNOSIS — I10 ESSENTIAL HYPERTENSION: ICD-10-CM

## 2023-12-18 DIAGNOSIS — M46.1 SACROILIITIS (HCC): ICD-10-CM

## 2023-12-18 DIAGNOSIS — E78.2 MIXED HYPERLIPIDEMIA: ICD-10-CM

## 2023-12-18 DIAGNOSIS — M81.0 OSTEOPOROSIS, POST-MENOPAUSAL: ICD-10-CM

## 2023-12-18 DIAGNOSIS — E03.9 ACQUIRED HYPOTHYROIDISM: ICD-10-CM

## 2023-12-18 DIAGNOSIS — D69.6 THROMBOCYTOPENIA (HCC): ICD-10-CM

## 2023-12-18 DIAGNOSIS — Z12.31 SCREENING MAMMOGRAM FOR BREAST CANCER: ICD-10-CM

## 2023-12-18 DIAGNOSIS — E55.9 VITAMIN D DEFICIENCY: ICD-10-CM

## 2023-12-18 DIAGNOSIS — Z85.850 HISTORY OF THYROID CANCER: ICD-10-CM

## 2023-12-18 PROCEDURE — G0439 PPPS, SUBSEQ VISIT: HCPCS | Performed by: FAMILY MEDICINE

## 2023-12-18 RX ORDER — OXYCODONE HYDROCHLORIDE AND ACETAMINOPHEN 5; 325 MG/1; MG/1
1 TABLET ORAL EVERY 4 HOURS PRN
Qty: 30 TABLET | Refills: 0 | Status: SHIPPED | OUTPATIENT
Start: 2023-12-18

## 2023-12-18 NOTE — PATIENT INSTRUCTIONS
Medicare Preventive Visit Patient Instructions  Thank you for completing your Welcome to Medicare Visit or Medicare Annual Wellness Visit today. Your next wellness visit will be due in one year (12/18/2024).  The screening/preventive services that you may require over the next 5-10 years are detailed below. Some tests may not apply to you based off risk factors and/or age. Screening tests ordered at today's visit but not completed yet may show as past due. Also, please note that scanned in results may not display below.  Preventive Screenings:  Service Recommendations Previous Testing/Comments   Colorectal Cancer Screening  * Colonoscopy    * Fecal Occult Blood Test (FOBT)/Fecal Immunochemical Test (FIT)  * Fecal DNA/Cologuard Test  * Flexible Sigmoidoscopy Age: 45-75 years old   Colonoscopy: every 10 years (may be performed more frequently if at higher risk)  OR  FOBT/FIT: every 1 year  OR  Cologuard: every 3 years  OR  Sigmoidoscopy: every 5 years  Screening may be recommended earlier than age 45 if at higher risk for colorectal cancer. Also, an individualized decision between you and your healthcare provider will decide whether screening between the ages of 76-85 would be appropriate. Colonoscopy: 09/11/2023  FOBT/FIT: Not on file  Cologuard: Not on file  Sigmoidoscopy: Not on file    Screening Current     Breast Cancer Screening Age: 40+ years old  Frequency: every 1-2 years  Not required if history of left and right mastectomy Mammogram: 12/23/2022    Screening Current   Cervical Cancer Screening Between the ages of 21-29, pap smear recommended once every 3 years.   Between the ages of 30-65, can perform pap smear with HPV co-testing every 5 years.   Recommendations may differ for women with a history of total hysterectomy, cervical cancer, or abnormal pap smears in past. Pap Smear: Not on file    Screening Not Indicated   Hepatitis C Screening Once for adults born between 1945 and 1965  More frequently in  patients at high risk for Hepatitis C Hep C Antibody: Not on file        Diabetes Screening 1-2 times per year if you're at risk for diabetes or have pre-diabetes Fasting glucose: 109 mg/dL (1/9/2023)  A1C: 5.6 % (1/27/2023)  Screening Current   Cholesterol Screening Once every 5 years if you don't have a lipid disorder. May order more often based on risk factors. Lipid panel: 12/28/2022    Screening Current     Other Preventive Screenings Covered by Medicare:  Abdominal Aortic Aneurysm (AAA) Screening: covered once if your at risk. You're considered to be at risk if you have a family history of AAA.  Lung Cancer Screening: covers low dose CT scan once per year if you meet all of the following conditions: (1) Age 55-77; (2) No signs or symptoms of lung cancer; (3) Current smoker or have quit smoking within the last 15 years; (4) You have a tobacco smoking history of at least 20 pack years (packs per day multiplied by number of years you smoked); (5) You get a written order from a healthcare provider.  Glaucoma Screening: covered annually if you're considered high risk: (1) You have diabetes OR (2) Family history of glaucoma OR (3)  aged 50 and older OR (4)  American aged 65 and older  Osteoporosis Screening: covered every 2 years if you meet one of the following conditions: (1) You're estrogen deficient and at risk for osteoporosis based off medical history and other findings; (2) Have a vertebral abnormality; (3) On glucocorticoid therapy for more than 3 months; (4) Have primary hyperparathyroidism; (5) On osteoporosis medications and need to assess response to drug therapy.   Last bone density test (DXA Scan): 12/20/2021.  HIV Screening: covered annually if you're between the age of 15-65. Also covered annually if you are younger than 15 and older than 65 with risk factors for HIV infection. For pregnant patients, it is covered up to 3 times per pregnancy.    Immunizations:  Immunization  Recommendations   Influenza Vaccine Annual influenza vaccination during flu season is recommended for all persons aged >= 6 months who do not have contraindications   Pneumococcal Vaccine   * Pneumococcal conjugate vaccine = PCV13 (Prevnar 13), PCV15 (Vaxneuvance), PCV20 (Prevnar 20)  * Pneumococcal polysaccharide vaccine = PPSV23 (Pneumovax) Adults 19-65 yo with certain risk factors or if 65+ yo  If never received any pneumonia vaccine: recommend Prevnar 20 (PCV20)  Give PCV20 if previously received 1 dose of PCV13 or PPSV23   Hepatitis B Vaccine 3 dose series if at intermediate or high risk (ex: diabetes, end stage renal disease, liver disease)   Respiratory syncytial virus (RSV) Vaccine - COVERED BY MEDICARE PART D  * RSVPreF3 (Arexvy) CDC recommends that adults 60 years of age and older may receive a single dose of RSV vaccine using shared clinical decision-making (SCDM)   Tetanus (Td) Vaccine - COST NOT COVERED BY MEDICARE PART B Following completion of primary series, a booster dose should be given every 10 years to maintain immunity against tetanus. Td may also be given as tetanus wound prophylaxis.   Tdap Vaccine - COST NOT COVERED BY MEDICARE PART B Recommended at least once for all adults. For pregnant patients, recommended with each pregnancy.   Shingles Vaccine (Shingrix) - COST NOT COVERED BY MEDICARE PART B  2 shot series recommended in those 19 years and older who have or will have weakened immune systems or those 50 years and older     Health Maintenance Due:      Topic Date Due   • Hepatitis C Screening  Never done   • Breast Cancer Screening: Mammogram  12/23/2024   • Colorectal Cancer Screening  09/11/2028     Immunizations Due:      Topic Date Due   • COVID-19 Vaccine (6 - 2023-24 season) 09/01/2023     Advance Directives   What are advance directives?  Advance directives are legal documents that state your wishes and plans for medical care. These plans are made ahead of time in case you lose  your ability to make decisions for yourself. Advance directives can apply to any medical decision, such as the treatments you want, and if you want to donate organs.   What are the types of advance directives?  There are many types of advance directives, and each state has rules about how to use them. You may choose a combination of any of the following:  Living will:  This is a written record of the treatment you want. You can also choose which treatments you do not want, which to limit, and which to stop at a certain time. This includes surgery, medicine, IV fluid, and tube feedings.   Durable power of  for healthcare (DPAHC):  This is a written record that states who you want to make healthcare choices for you when you are unable to make them for yourself. This person, called a proxy, is usually a family member or a friend. You may choose more than 1 proxy.  Do not resuscitate (DNR) order:  A DNR order is used in case your heart stops beating or you stop breathing. It is a request not to have certain forms of treatment, such as CPR. A DNR order may be included in other types of advance directives.  Medical directive:  This covers the care that you want if you are in a coma, near death, or unable to make decisions for yourself. You can list the treatments you want for each condition. Treatment may include pain medicine, surgery, blood transfusions, dialysis, IV or tube feedings, and a ventilator (breathing machine).  Values history:  This document has questions about your views, beliefs, and how you feel and think about life. This information can help others choose the care that you would choose.  Why are advance directives important?  An advance directive helps you control your care. Although spoken wishes may be used, it is better to have your wishes written down. Spoken wishes can be misunderstood, or not followed. Treatments may be given even if you do not want them. An advance directive may make it  easier for your family to make difficult choices about your care.       © Copyright [employer] 2018 Information is for End User's use only and may not be sold, redistributed or otherwise used for commercial purposes. All illustrations and images included in CareNotes® are the copyrighted property of [employer] or [employer]

## 2023-12-18 NOTE — PROGRESS NOTES
Assessment and Plan:     Problem List Items Addressed This Visit     History of thyroid cancer     Quiescent and still followed by Endo on a yearly basis.         Hypothyroidism     Controlled         Thrombocytopenia (HCC)     Continue follow-up with heme/onc         Relevant Orders    CBC and differential    Essential hypertension     Controlled         Relevant Orders    Comprehensive metabolic panel    Medicare annual wellness visit, subsequent - Primary     Routine labs, routine mammogram and DEXA scheduled.  Patient did have annual flu vax and is up-to-date with pneumococcal vaccines and Adacel.  I did encourage new COVID-vaccine and Shingrix.  Otherwise I encouraged her to continue her active lifestyle.         Sacroiliitis (HCC)     As long as it symptoms infrequent to continue same treatment. did give her a small amount of Percocet as needed for severe symptoms.  Also encouraged to try Voltaren gel as needed.  If symptoms worsen to call and we will try injection.         Relevant Medications    oxyCODONE-acetaminophen (Percocet) 5-325 mg per tablet   Other Visit Diagnoses     Encounter for immunization        Mixed hyperlipidemia        Relevant Orders    Lipid panel    Vitamin D deficiency        Relevant Orders    Vitamin D 25 hydroxy    Screening mammogram for breast cancer        Relevant Orders    Mammo screening bilateral w 3d & cad    Osteoporosis, post-menopausal        Relevant Orders    DXA bone density spine hip and pelvis           Preventive health issues were discussed with patient, and age appropriate screening tests were ordered as noted in patient's After Visit Summary.  Personalized health advice and appropriate referrals for health education or preventive services given if needed, as noted in patient's After Visit Summary.     History of Present Illness:     Patient presents for a Medicare Wellness Visit    Selena doing great, and care of her grandkids and enjoyed a recent trip to [place].   Only complaint is intermittent pain over her right SI joint area radiating over her lateral hip and into inner aspect of right upper thigh without numbness; relieved somewhat with Tylenol and Salonpas; not wanting to take nonsteroidal anti-inflammatories due to her abnormal CBC.  She states symptoms come and go randomly to be once a month and not at all related to activity level.  She did have some old Percocet she tried with relief.  She otherwise is quite active without complaints.    She does see Dr. Heath  due to a platelet dysfunction; stable at present and seems to remain stable as long as she stays very hydrated.  She also still sees Dr. Negron once yearly due to her thyroid condition, all quite stable.  She had a recent colonoscopy by Dr. Reina due for repeat in 5 years.  She no longer sees a GYN due to history of total abdominal hysterectomy though still with ovaries however with negative family history and without any  or GI complaints.       Patient Care Team:  Sera Perry MD as PCP - General (Family Medicine)     Review of Systems:     Review of Systems     Problem List:     Patient Active Problem List   Diagnosis   • FELICIA positive   • History of thyroid cancer   • Hypothyroidism   • Splenomegaly   • Thrombocytopenia (HCC)   • Essential hypertension   • Medicare annual wellness visit, subsequent   • Sacroiliitis (HCC)      Past Medical and Surgical History:     Past Medical History:   Diagnosis Date   • Disease of thyroid gland    • Hypertension      Past Surgical History:   Procedure Laterality Date   • COLONOSCOPY  10/26/2017    Dr. Guevara - polyp in sigmoid colon removed; moderate diverticulosis in sigmoid colon. Bx: tubular adenoma.   • COLONOSCOPY  08/03/2012    Dr. Guevara - polyp found in sigmoid colon removed. Moderately severe diverticulosis in the sigmoid colon. Bx: tubular adenoma.   • HYSTERECTOMY        Family History:     Family History   Problem Relation Age of  Onset   • Leukemia Mother         chronic lymphocytic leukemia   • Hashimoto's thyroiditis Mother    • Diabetes type II Mother    • Lung cancer Father    • No Known Problems Brother    • No Known Problems Maternal Grandmother    • No Known Problems Maternal Grandfather    • No Known Problems Paternal Grandmother    • No Known Problems Paternal Grandfather    • No Known Problems Maternal Aunt    • No Known Problems Paternal Aunt    • Breast cancer Cousin    • No Known Problems Daughter    • No Known Problems Son       Social History:     Social History     Socioeconomic History   • Marital status: /Civil Union     Spouse name: None   • Number of children: 1   • Years of education: None   • Highest education level: None   Occupational History   • 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   Tobacco Use   • Smoking status: Former     Current packs/day: 0.00     Types: Cigarettes     Quit date:      Years since quittin.9   • Smokeless tobacco: Never   Vaping Use   • Vaping status: Never Used   Substance and Sexual Activity   • Alcohol use: Never   • Drug use: Never   • Sexual activity: None   Other Topics Concern   • None   Social History Narrative   • None     Social Determinants of Health     Financial Resource Strain: Low Risk  (2023)    Overall Financial Resource Strain (CARDIA)    • Difficulty of Paying Living Expenses: Not hard at all   Food Insecurity: Not on file   Transportation Needs: No Transportation Needs (2023)    PRAPARE - Transportation    • Lack of Transportation (Medical): No    • Lack of Transportation (Non-Medical): No   Physical Activity: Not on file   Stress: Not on file   Social Connections: Not on file   Intimate Partner Violence: Not on file   Housing Stability: Not on file      Medications and Allergies:     Current Outpatient Medications   Medication Sig Dispense Refill   • atenolol (TENORMIN) 50 mg tablet Take 50 mg by mouth 2 (two) times a day     • Biotin 1000 MCG CHEW Chew daily    "  • Cholecalciferol (Vitamin D3) 50 MCG (2000 UT) TABS Take 2,000 Units by mouth daily     • cyanocobalamin (VITAMIN B-12) 1000 MCG tablet Take 1,000 mcg by mouth daily     • levothyroxine 100 mcg tablet take 1 tablet by mouth 6 days  PER WEEK, 1/2 TABELT ONE DAY A WEEK     • lisinopril (ZESTRIL) 10 mg tablet Take 10 mg by mouth daily     • oxyCODONE-acetaminophen (Percocet) 5-325 mg per tablet Take 1 tablet by mouth every 4 (four) hours as needed for moderate pain Max Daily Amount: 6 tablets 30 tablet 0     No current facility-administered medications for this visit.     Allergies   Allergen Reactions   • Ciprofloxacin Lip Swelling   • Erythromycin Other (See Comments)     \"jaundice\"   • Penicillins Swelling      Immunizations:     Immunization History   Administered Date(s) Administered   • COVID-19 MODERNA VACC 0.5 ML IM 01/25/2021, 02/26/2021, 12/03/2021, 11/23/2022   • COVID-19 Moderna Vac BIVALENT 12 Yr+ IM 0.5 ML 11/23/2022   • INFLUENZA 10/02/2020, 11/05/2021, 10/07/2022   • Influenza, seasonal, injectable 11/15/2023   • Pneumococcal Conjugate 13-Valent 04/19/2019, 10/02/2020   • Pneumococcal Polysaccharide PPV23 10/02/2020   • Tdap 02/01/2018      Health Maintenance:         Topic Date Due   • Hepatitis C Screening  Never done   • Breast Cancer Screening: Mammogram  12/23/2024   • Colorectal Cancer Screening  09/11/2028         Topic Date Due   • COVID-19 Vaccine (6 - 2023-24 season) 09/01/2023      Medicare Screening Tests and Risk Assessments:     Last Medicare Wellness visit information reviewed, patient interviewed and updates made to the record today.      Health Risk Assessment:   Patient rates overall health as excellent. Patient feels that their physical health rating is same. Patient is satisfied with their life. Eyesight was rated as same. Hearing was rated as same. Patient feels that their emotional and mental health rating is same. Patients states they are never, rarely angry. Patient states " they are sometimes unusually tired/fatigued. Pain experienced in the last 7 days has been some. Patient's pain rating has been 6/10. Patient states that she has experienced no weight loss or gain in last 6 months. Pain as noted - infrequent and never long-lasting   not at all limiting activities    Fall Risk Screening:   In the past year, patient has experienced: no history of falling in past year      Urinary Incontinence Screening:   Patient has not leaked urine accidently in the last six months.     Home Safety:  Patient does not have trouble with stairs inside or outside of their home. Patient has working smoke alarms and has working carbon monoxide detector. Home safety hazards include: none.     Nutrition:   Current diet is Regular.     Medications:   Patient is currently taking over-the-counter supplements. OTC medications include: see medication list. Patient is able to manage medications.     Activities of Daily Living (ADLs)/Instrumental Activities of Daily Living (IADLs):   Walk and transfer into and out of bed and chair?: Yes  Dress and groom yourself?: Yes    Bathe or shower yourself?: Yes    Feed yourself? Yes  Do your laundry/housekeeping?: Yes  Manage your money, pay your bills and track your expenses?: Yes  Make your own meals?: Yes    Do your own shopping?: Yes    Previous Hospitalizations:   Any hospitalizations or ED visits within the last 12 months?: No      Advance Care Planning:   Living will: Yes    Durable POA for healthcare: Yes    Advanced directive: Yes    Advanced directive counseling given: Yes    ACP document given: Yes    Patient declined ACP directive: No      Comments: Discussed ACP with pt   She review and discuss with ingris    Cognitive Screening:   Provider or family/friend/caregiver concerned regarding cognition?: No    PREVENTIVE SCREENINGS      Cardiovascular Screening:    General: Screening Current    Due for: Lipid Panel      Diabetes Screening:     General: Screening  "Current    Due for: Blood Glucose      Colorectal Cancer Screening:     General: Screening Current      Breast Cancer Screening:     General: Screening Current    Due for: Mammogram        Cervical Cancer Screening:    General: Screening Not Indicated      Osteoporosis Screening:    General: Screening Not Indicated and History Osteoporosis    Due for: DXA Axial      Abdominal Aortic Aneurysm (AAA) Screening:        General: Screening Not Indicated      Lung Cancer Screening:     General: Screening Not Indicated      Hepatitis C Screening:    General: Screening Not Indicated    Screening, Brief Intervention, and Referral to Treatment (SBIRT)    Screening  Typical number of drinks in a day: 0  Typical number of drinks in a week: 0  Interpretation: Low risk drinking behavior.    Single Item Drug Screening:  How often have you used an illegal drug (including marijuana) or a prescription medication for non-medical reasons in the past year? never    Single Item Drug Screen Score: 0  Interpretation: Negative screen for possible drug use disorder    Other Counseling Topics:   Calcium and vitamin D intake and regular weightbearing exercise.     No results found.     Physical Exam:     /78 (BP Location: Left arm, Patient Position: Sitting, Cuff Size: Standard)   Pulse 69   Temp 99 °F (37.2 °C) (Tympanic)   Ht 5' 2.5\" (1.588 m)   Wt 62.1 kg (137 lb)   SpO2 98%   BMI 24.66 kg/m²     Physical Exam  Vitals and nursing note reviewed.   Constitutional:       General: She is not in acute distress.     Appearance: Normal appearance. She is well-developed.   HENT:      Head: Normocephalic and atraumatic.   Eyes:      Conjunctiva/sclera: Conjunctivae normal.   Neck:      Vascular: No carotid bruit.   Cardiovascular:      Rate and Rhythm: Normal rate and regular rhythm.      Heart sounds: No murmur heard.  Pulmonary:      Effort: Pulmonary effort is normal. No respiratory distress.      Breath sounds: Normal breath sounds. "   Abdominal:      Palpations: Abdomen is soft.      Tenderness: There is no abdominal tenderness.   Musculoskeletal:         General: No swelling.      Cervical back: Neck supple.      Right lower leg: No edema.      Left lower leg: No edema.   Lymphadenopathy:      Cervical: No cervical adenopathy.   Skin:     General: Skin is warm and dry.      Capillary Refill: Capillary refill takes less than 2 seconds.   Neurological:      Mental Status: She is alert and oriented to person, place, and time.   Psychiatric:         Mood and Affect: Mood normal.         Behavior: Behavior normal.          Sera Perry MD

## 2023-12-18 NOTE — ASSESSMENT & PLAN NOTE
Routine labs, routine mammogram and DEXA scheduled.  Patient did have annual flu vax and is up-to-date with pneumococcal vaccines and Adacel.  I did encourage new COVID-vaccine and Shingrix.  Otherwise I encouraged her to continue her active lifestyle.

## 2023-12-18 NOTE — ASSESSMENT & PLAN NOTE
As long as it symptoms infrequent to continue same treatment. did give her a small amount of Percocet as needed for severe symptoms.  Also encouraged to try Voltaren gel as needed.  If symptoms worsen to call and we will try injection.

## 2023-12-19 DIAGNOSIS — I10 ESSENTIAL HYPERTENSION: Primary | ICD-10-CM

## 2023-12-19 RX ORDER — ATENOLOL 50 MG/1
50 TABLET ORAL 2 TIMES DAILY
Qty: 180 TABLET | Refills: 2 | Status: SHIPPED | OUTPATIENT
Start: 2023-12-19

## 2024-01-22 ENCOUNTER — APPOINTMENT (OUTPATIENT)
Dept: LAB | Facility: MEDICAL CENTER | Age: 69
End: 2024-01-22
Payer: MEDICARE

## 2024-01-22 DIAGNOSIS — I10 ESSENTIAL HYPERTENSION: ICD-10-CM

## 2024-01-22 DIAGNOSIS — D69.6 THROMBOCYTOPENIA (HCC): ICD-10-CM

## 2024-01-22 DIAGNOSIS — E55.9 VITAMIN D DEFICIENCY: ICD-10-CM

## 2024-01-22 DIAGNOSIS — E78.2 MIXED HYPERLIPIDEMIA: ICD-10-CM

## 2024-01-22 DIAGNOSIS — Z85.850 HISTORY OF THYROID CANCER: ICD-10-CM

## 2024-01-22 DIAGNOSIS — E03.9 HYPOTHYROIDISM, UNSPECIFIED TYPE: Primary | ICD-10-CM

## 2024-01-22 LAB
25(OH)D3 SERPL-MCNC: 63.2 NG/ML (ref 30–100)
ALBUMIN SERPL BCP-MCNC: 4.6 G/DL (ref 3.5–5)
ALP SERPL-CCNC: 57 U/L (ref 34–104)
ALT SERPL W P-5'-P-CCNC: 25 U/L (ref 7–52)
ANION GAP SERPL CALCULATED.3IONS-SCNC: 9 MMOL/L
AST SERPL W P-5'-P-CCNC: 29 U/L (ref 13–39)
BASOPHILS # BLD AUTO: 0.03 THOUSANDS/ÂΜL (ref 0–0.1)
BASOPHILS NFR BLD AUTO: 1 % (ref 0–1)
BILIRUB SERPL-MCNC: 1.23 MG/DL (ref 0.2–1)
BUN SERPL-MCNC: 16 MG/DL (ref 5–25)
CALCIUM SERPL-MCNC: 10.2 MG/DL (ref 8.4–10.2)
CHLORIDE SERPL-SCNC: 102 MMOL/L (ref 96–108)
CHOLEST SERPL-MCNC: 194 MG/DL
CO2 SERPL-SCNC: 29 MMOL/L (ref 21–32)
CREAT SERPL-MCNC: 0.89 MG/DL (ref 0.6–1.3)
EOSINOPHIL # BLD AUTO: 0.17 THOUSAND/ÂΜL (ref 0–0.61)
EOSINOPHIL NFR BLD AUTO: 4 % (ref 0–6)
ERYTHROCYTE [DISTWIDTH] IN BLOOD BY AUTOMATED COUNT: 13.3 % (ref 11.6–15.1)
GFR SERPL CREATININE-BSD FRML MDRD: 66 ML/MIN/1.73SQ M
GLUCOSE P FAST SERPL-MCNC: 103 MG/DL (ref 65–99)
HCT VFR BLD AUTO: 38.5 % (ref 34.8–46.1)
HDLC SERPL-MCNC: 36 MG/DL
HGB BLD-MCNC: 12.5 G/DL (ref 11.5–15.4)
IMM GRANULOCYTES # BLD AUTO: 0.02 THOUSAND/UL (ref 0–0.2)
IMM GRANULOCYTES NFR BLD AUTO: 0 % (ref 0–2)
LDLC SERPL CALC-MCNC: 120 MG/DL (ref 0–100)
LYMPHOCYTES # BLD AUTO: 2.07 THOUSANDS/ÂΜL (ref 0.6–4.47)
LYMPHOCYTES NFR BLD AUTO: 44 % (ref 14–44)
MCH RBC QN AUTO: 30 PG (ref 26.8–34.3)
MCHC RBC AUTO-ENTMCNC: 32.5 G/DL (ref 31.4–37.4)
MCV RBC AUTO: 92 FL (ref 82–98)
MONOCYTES # BLD AUTO: 0.41 THOUSAND/ÂΜL (ref 0.17–1.22)
MONOCYTES NFR BLD AUTO: 9 % (ref 4–12)
NEUTROPHILS # BLD AUTO: 1.96 THOUSANDS/ÂΜL (ref 1.85–7.62)
NEUTS SEG NFR BLD AUTO: 42 % (ref 43–75)
NONHDLC SERPL-MCNC: 158 MG/DL
NRBC BLD AUTO-RTO: 0 /100 WBCS
PLATELET # BLD AUTO: 73 THOUSANDS/UL (ref 149–390)
POTASSIUM SERPL-SCNC: 4.4 MMOL/L (ref 3.5–5.3)
PROT SERPL-MCNC: 7.6 G/DL (ref 6.4–8.4)
RBC # BLD AUTO: 4.17 MILLION/UL (ref 3.81–5.12)
SODIUM SERPL-SCNC: 140 MMOL/L (ref 135–147)
TRIGL SERPL-MCNC: 189 MG/DL
TSH SERPL DL<=0.05 MIU/L-ACNC: 2.5 UIU/ML (ref 0.45–4.5)
WBC # BLD AUTO: 4.66 THOUSAND/UL (ref 4.31–10.16)

## 2024-01-22 PROCEDURE — 84443 ASSAY THYROID STIM HORMONE: CPT

## 2024-01-22 PROCEDURE — 84432 ASSAY OF THYROGLOBULIN: CPT

## 2024-01-22 PROCEDURE — 82306 VITAMIN D 25 HYDROXY: CPT

## 2024-01-22 PROCEDURE — 86800 THYROGLOBULIN ANTIBODY: CPT

## 2024-01-22 PROCEDURE — 80061 LIPID PANEL: CPT

## 2024-01-22 PROCEDURE — 80053 COMPREHEN METABOLIC PANEL: CPT

## 2024-01-22 PROCEDURE — 36415 COLL VENOUS BLD VENIPUNCTURE: CPT

## 2024-01-22 PROCEDURE — 85025 COMPLETE CBC W/AUTO DIFF WBC: CPT

## 2024-02-02 LAB
THYROGLOB AB SERPL-ACNC: 10.4 IU/ML (ref 0–0.9)
THYROGLOB SERPL-MCNC: 2.4 NG/ML

## 2024-02-05 ENCOUNTER — HOSPITAL ENCOUNTER (OUTPATIENT)
Dept: BONE DENSITY | Facility: HOSPITAL | Age: 69
Discharge: HOME | End: 2024-02-05
Attending: FAMILY MEDICINE
Payer: MEDICARE

## 2024-02-05 ENCOUNTER — HOSPITAL ENCOUNTER (OUTPATIENT)
Dept: MAMMOGRAPHY | Facility: HOSPITAL | Age: 69
Discharge: HOME | End: 2024-02-05
Attending: FAMILY MEDICINE
Payer: MEDICARE

## 2024-02-05 VITALS — HEIGHT: 62 IN | BODY MASS INDEX: 25.58 KG/M2 | WEIGHT: 139 LBS

## 2024-02-05 VITALS — WEIGHT: 137 LBS | BODY MASS INDEX: 24.27 KG/M2 | HEIGHT: 63 IN

## 2024-02-05 DIAGNOSIS — Z12.31 SCREENING MAMMOGRAM FOR BREAST CANCER: ICD-10-CM

## 2024-02-05 DIAGNOSIS — M81.0 OSTEOPOROSIS, POST-MENOPAUSAL: ICD-10-CM

## 2024-02-05 PROCEDURE — 77080 DXA BONE DENSITY AXIAL: CPT

## 2024-02-05 PROCEDURE — 77063 BREAST TOMOSYNTHESIS BI: CPT

## 2024-02-05 PROCEDURE — 77067 SCR MAMMO BI INCL CAD: CPT

## 2024-02-16 PROBLEM — Z00.00 MEDICARE ANNUAL WELLNESS VISIT, SUBSEQUENT: Status: RESOLVED | Noted: 2023-12-18 | Resolved: 2024-02-16

## 2024-03-01 ENCOUNTER — APPOINTMENT (OUTPATIENT)
Dept: LAB | Facility: MEDICAL CENTER | Age: 69
End: 2024-03-01
Payer: MEDICARE

## 2024-03-01 DIAGNOSIS — D69.6 THROMBOCYTOPENIA (HCC): ICD-10-CM

## 2024-03-01 LAB
BASOPHILS # BLD AUTO: 0.05 THOUSANDS/ÂΜL (ref 0–0.1)
BASOPHILS NFR BLD AUTO: 1 % (ref 0–1)
EOSINOPHIL # BLD AUTO: 0.2 THOUSAND/ÂΜL (ref 0–0.61)
EOSINOPHIL NFR BLD AUTO: 3 % (ref 0–6)
ERYTHROCYTE [DISTWIDTH] IN BLOOD BY AUTOMATED COUNT: 13.3 % (ref 11.6–15.1)
HCT VFR BLD AUTO: 38.8 % (ref 34.8–46.1)
HGB BLD-MCNC: 12.8 G/DL (ref 11.5–15.4)
IMM GRANULOCYTES # BLD AUTO: 0.02 THOUSAND/UL (ref 0–0.2)
IMM GRANULOCYTES NFR BLD AUTO: 0 % (ref 0–2)
LYMPHOCYTES # BLD AUTO: 2.78 THOUSANDS/ÂΜL (ref 0.6–4.47)
LYMPHOCYTES NFR BLD AUTO: 40 % (ref 14–44)
MCH RBC QN AUTO: 30.3 PG (ref 26.8–34.3)
MCHC RBC AUTO-ENTMCNC: 33 G/DL (ref 31.4–37.4)
MCV RBC AUTO: 92 FL (ref 82–98)
MONOCYTES # BLD AUTO: 0.71 THOUSAND/ÂΜL (ref 0.17–1.22)
MONOCYTES NFR BLD AUTO: 10 % (ref 4–12)
NEUTROPHILS # BLD AUTO: 3.2 THOUSANDS/ÂΜL (ref 1.85–7.62)
NEUTS SEG NFR BLD AUTO: 46 % (ref 43–75)
NRBC BLD AUTO-RTO: 0 /100 WBCS
PLATELET # BLD AUTO: 81 THOUSANDS/UL (ref 149–390)
PMV BLD AUTO: 13.5 FL (ref 8.9–12.7)
RBC # BLD AUTO: 4.23 MILLION/UL (ref 3.81–5.12)
WBC # BLD AUTO: 6.96 THOUSAND/UL (ref 4.31–10.16)

## 2024-03-01 PROCEDURE — 36415 COLL VENOUS BLD VENIPUNCTURE: CPT

## 2024-03-01 PROCEDURE — 85025 COMPLETE CBC W/AUTO DIFF WBC: CPT

## 2024-03-29 DIAGNOSIS — I10 ESSENTIAL HYPERTENSION: Primary | ICD-10-CM

## 2024-03-29 RX ORDER — LISINOPRIL 10 MG/1
10 TABLET ORAL DAILY
Qty: 30 TABLET | Refills: 2 | Status: SHIPPED | OUTPATIENT
Start: 2024-03-29

## 2024-04-22 DIAGNOSIS — I10 ESSENTIAL HYPERTENSION: ICD-10-CM

## 2024-04-22 RX ORDER — LISINOPRIL 10 MG/1
10 TABLET ORAL DAILY
Qty: 90 TABLET | Refills: 1 | Status: SHIPPED | OUTPATIENT
Start: 2024-04-22

## 2024-06-26 ENCOUNTER — OFFICE VISIT (OUTPATIENT)
Dept: FAMILY MEDICINE CLINIC | Facility: CLINIC | Age: 69
End: 2024-06-26
Payer: MEDICARE

## 2024-06-26 ENCOUNTER — APPOINTMENT (OUTPATIENT)
Dept: LAB | Facility: MEDICAL CENTER | Age: 69
End: 2024-06-26
Payer: MEDICARE

## 2024-06-26 VITALS
BODY MASS INDEX: 24.27 KG/M2 | WEIGHT: 137 LBS | OXYGEN SATURATION: 98 % | DIASTOLIC BLOOD PRESSURE: 82 MMHG | TEMPERATURE: 97.1 F | HEART RATE: 98 BPM | SYSTOLIC BLOOD PRESSURE: 128 MMHG | HEIGHT: 63 IN

## 2024-06-26 DIAGNOSIS — D69.6 THROMBOCYTOPENIA (HCC): ICD-10-CM

## 2024-06-26 DIAGNOSIS — I10 ESSENTIAL HYPERTENSION: ICD-10-CM

## 2024-06-26 DIAGNOSIS — C73 THYROID CANCER (HCC): ICD-10-CM

## 2024-06-26 DIAGNOSIS — M10.9 PODAGRA: Primary | ICD-10-CM

## 2024-06-26 DIAGNOSIS — D69.3 AUTOIMMUNE THROMBOCYTOPENIA (HCC): ICD-10-CM

## 2024-06-26 DIAGNOSIS — D69.6 THROMBOCYTOPENIA, UNSPECIFIED (HCC): ICD-10-CM

## 2024-06-26 DIAGNOSIS — M46.1 SACROILIITIS (HCC): ICD-10-CM

## 2024-06-26 LAB
BASOPHILS # BLD AUTO: 0.03 THOUSANDS/ÂΜL (ref 0–0.1)
BASOPHILS NFR BLD AUTO: 1 % (ref 0–1)
EOSINOPHIL # BLD AUTO: 0.13 THOUSAND/ÂΜL (ref 0–0.61)
EOSINOPHIL NFR BLD AUTO: 2 % (ref 0–6)
ERYTHROCYTE [DISTWIDTH] IN BLOOD BY AUTOMATED COUNT: 13.8 % (ref 11.6–15.1)
HCT VFR BLD AUTO: 34.8 % (ref 34.8–46.1)
HGB BLD-MCNC: 12.7 G/DL (ref 11.5–15.4)
IMM GRANULOCYTES # BLD AUTO: 0.02 THOUSAND/UL (ref 0–0.2)
IMM GRANULOCYTES NFR BLD AUTO: 0 % (ref 0–2)
LYMPHOCYTES # BLD AUTO: 2.58 THOUSANDS/ÂΜL (ref 0.6–4.47)
LYMPHOCYTES NFR BLD AUTO: 44 % (ref 14–44)
MCH RBC QN AUTO: 35.1 PG (ref 26.8–34.3)
MCHC RBC AUTO-ENTMCNC: 36.5 G/DL (ref 31.4–37.4)
MCV RBC AUTO: 96 FL (ref 82–98)
MONOCYTES # BLD AUTO: 0.28 THOUSAND/ÂΜL (ref 0.17–1.22)
MONOCYTES NFR BLD AUTO: 5 % (ref 4–12)
NEUTROPHILS # BLD AUTO: 2.85 THOUSANDS/ÂΜL (ref 1.85–7.62)
NEUTS SEG NFR BLD AUTO: 48 % (ref 43–75)
NRBC BLD AUTO-RTO: 0 /100 WBCS
PLATELET # BLD AUTO: 91 THOUSANDS/UL (ref 149–390)
PMV BLD AUTO: 13.7 FL (ref 8.9–12.7)
RBC # BLD AUTO: 3.62 MILLION/UL (ref 3.81–5.12)
WBC # BLD AUTO: 5.89 THOUSAND/UL (ref 4.31–10.16)

## 2024-06-26 PROCEDURE — 36415 COLL VENOUS BLD VENIPUNCTURE: CPT

## 2024-06-26 PROCEDURE — 85025 COMPLETE CBC W/AUTO DIFF WBC: CPT

## 2024-06-26 PROCEDURE — 96372 THER/PROPH/DIAG INJ SC/IM: CPT | Performed by: INTERNAL MEDICINE

## 2024-06-26 PROCEDURE — 99213 OFFICE O/P EST LOW 20 MIN: CPT | Performed by: INTERNAL MEDICINE

## 2024-06-26 RX ORDER — DEXAMETHASONE SODIUM PHOSPHATE 10 MG/ML
10 INJECTION INTRAMUSCULAR; INTRAVENOUS ONCE
Status: DISCONTINUED | OUTPATIENT
Start: 2024-06-26 | End: 2024-06-26

## 2024-06-26 RX ORDER — PREDNISONE 10 MG/1
TABLET ORAL
Qty: 20 TABLET | Refills: 0 | Status: SHIPPED | OUTPATIENT
Start: 2024-06-26

## 2024-06-26 RX ORDER — DEXAMETHASONE SODIUM PHOSPHATE 10 MG/ML
10 INJECTION INTRAMUSCULAR; INTRAVENOUS ONCE
Status: COMPLETED | OUTPATIENT
Start: 2024-06-26 | End: 2024-06-26

## 2024-06-26 RX ADMIN — DEXAMETHASONE SODIUM PHOSPHATE 10 MG: 10 INJECTION INTRAMUSCULAR; INTRAVENOUS at 11:53

## 2024-06-26 NOTE — ASSESSMENT & PLAN NOTE
Follows with hematology/oncology.  Had CBC today that is pending..  She is suspected of having chronic ITP.

## 2024-06-26 NOTE — PROGRESS NOTES
Ambulatory Visit  Name: Ashley Phillips      : 1955      MRN: [id number]  Encounter Provider: Sulaiman Hernandez DO  Encounter Date: 2024   Encounter department: [facility]    Assessment & Plan   1. Podagra  Assessment & Plan:  Given her significant thrombocytopenia, will avoid an NSAIDs at this point.  Decadron followed by prednisone.  If symptoms fail to her improve she will call and may need joint injection.  Her uric acid in the past was 8.5, so if she gets this more often would likely place her on some allopurinol.  She will look up foods to avoid that can contribute to gout.  Stay hydrated to see other very important point during this hot summer months.      Orders:  -     dexamethasone (DECADRON) injection 10 mg  -     predniSONE 10 mg tablet; 4 pills for 2 days, then 3 pills for 2 days, then 2 pills for 2 days then 1 pill for 2 days.  2. Autoimmune thrombocytopenia (HCC)  Assessment & Plan:  Follows with hematology/oncology.  Had CBC today that is pending..  She is suspected of having chronic ITP.  3. Thrombocytopenia (HCC)  4. Thyroid cancer (HCC)  Assessment & Plan:  Had papillary carcinoma thyroid as well as Hashimoto's.  Had a total thyroidectomy dating back almost 10 years now.  Remains on thyroid replacement.  Follows with endocrinology.  5. Sacroiliitis (HCC)  Assessment & Plan:  Is a chronic condition, consider physical therapy and/or chiropractic care  6. Essential hypertension  Assessment & Plan:  Continue current medical therapy.       History of Present Illness     Patient here today with several days of pain MTP joint.  Has a past history of gout.  Uric acid has been elevated 8.5 in the past.  This is her second bout in over a year.  She denies any fever or chills.  She does not drink alcohol she states.  Her other chronic illnesses are stable.  She suffers from her chronic SI issue.        Review of Systems   Respiratory:  Negative for shortness of breath.   "  Cardiovascular:  Negative for chest pain and leg swelling.   Genitourinary:  Negative for difficulty urinating and urgency.   Musculoskeletal:  Positive for arthralgias and back pain.       Objective     /82 (BP Location: Left arm, Patient Position: Sitting, Cuff Size: Standard)   Pulse 98   Temp (!) 97.1 °F (36.2 °C) (Tympanic)   Ht 5' 2.5\" (1.588 m)   Wt 62.1 kg (137 lb)   SpO2 98%   BMI 24.66 kg/m²     Physical Exam  Vitals and nursing note reviewed.   Constitutional:       General: She is not in acute distress.     Appearance: Normal appearance. She is well-developed.   HENT:      Head: Normocephalic and atraumatic.   Eyes:      Conjunctiva/sclera: Conjunctivae normal.   Cardiovascular:      Rate and Rhythm: Normal rate and regular rhythm.      Heart sounds: No murmur heard.  Pulmonary:      Effort: Pulmonary effort is normal. No respiratory distress.      Breath sounds: Normal breath sounds.   Abdominal:      Palpations: Abdomen is soft.      Tenderness: There is no abdominal tenderness.   Musculoskeletal:         General: No swelling.      Cervical back: Neck supple.      Comments: Swollen tender and erythematous right metatarsal phalangeal joint.   Skin:     General: Skin is warm and dry.      Capillary Refill: Capillary refill takes less than 2 seconds.   Neurological:      Mental Status: She is alert.   Psychiatric:         Mood and Affect: Mood normal.       Administrative Statements           "

## 2024-06-26 NOTE — ASSESSMENT & PLAN NOTE
Had papillary carcinoma thyroid as well as Hashimoto's.  Had a total thyroidectomy dating back almost 10 years now.  Remains on thyroid replacement.  Follows with endocrinology.

## 2024-06-26 NOTE — ASSESSMENT & PLAN NOTE
Given her significant thrombocytopenia, will avoid an NSAIDs at this point.  Decadron followed by prednisone.  If symptoms fail to her improve she will call and may need joint injection.  Her uric acid in the past was 8.5, so if she gets this more often would likely place her on some allopurinol.  She will look up foods to avoid that can contribute to gout.  Stay hydrated to see other very important point during this hot summer months.

## 2024-07-26 ENCOUNTER — OFFICE VISIT (OUTPATIENT)
Dept: FAMILY MEDICINE CLINIC | Facility: CLINIC | Age: 69
End: 2024-07-26
Payer: MEDICARE

## 2024-07-26 VITALS
SYSTOLIC BLOOD PRESSURE: 148 MMHG | DIASTOLIC BLOOD PRESSURE: 78 MMHG | TEMPERATURE: 97.9 F | OXYGEN SATURATION: 98 % | BODY MASS INDEX: 24.77 KG/M2 | HEART RATE: 74 BPM | HEIGHT: 63 IN | WEIGHT: 139.8 LBS

## 2024-07-26 DIAGNOSIS — M10.9 ACUTE GOUT OF LEFT FOOT, UNSPECIFIED CAUSE: Primary | ICD-10-CM

## 2024-07-26 DIAGNOSIS — L03.90 CELLULITIS, UNSPECIFIED CELLULITIS SITE: ICD-10-CM

## 2024-07-26 PROCEDURE — 96372 THER/PROPH/DIAG INJ SC/IM: CPT | Performed by: NURSE PRACTITIONER

## 2024-07-26 PROCEDURE — 99213 OFFICE O/P EST LOW 20 MIN: CPT | Performed by: NURSE PRACTITIONER

## 2024-07-26 RX ORDER — CEPHALEXIN 500 MG/1
500 CAPSULE ORAL 3 TIMES DAILY
Qty: 21 CAPSULE | Refills: 0 | Status: SHIPPED | OUTPATIENT
Start: 2024-07-26 | End: 2024-08-02

## 2024-07-26 RX ORDER — METHYLPREDNISOLONE 4 MG/1
TABLET ORAL
Qty: 21 EACH | Refills: 0 | Status: SHIPPED | OUTPATIENT
Start: 2024-07-26

## 2024-07-26 RX ORDER — DEXAMETHASONE SODIUM PHOSPHATE 10 MG/ML
10 INJECTION INTRAMUSCULAR; INTRAVENOUS ONCE
Status: COMPLETED | OUTPATIENT
Start: 2024-07-26 | End: 2024-07-26

## 2024-07-26 RX ADMIN — DEXAMETHASONE SODIUM PHOSPHATE 10 MG: 10 INJECTION INTRAMUSCULAR; INTRAVENOUS at 13:20

## 2024-07-26 NOTE — PROGRESS NOTES
"Ambulatory Visit  Name: Ashley Phillips      : 1955      MRN: [id number]  Encounter Provider: RADHA Baez  Encounter Date: 2024   Encounter department: [facility]    Assessment & Plan   1. Acute gout of left foot, unspecified cause  Comments:  Recheck next week -? allopurinol  Orders:  -     dexamethasone (DECADRON) injection 10 mg  -     methylPREDNISolone 4 MG tablet therapy pack; Use as directed on package  2. Cellulitis, unspecified cellulitis site  Comments:  Less likely, but start keflex if not better in 48 hours  Orders:  -     cephalexin (KEFLEX) 500 mg capsule; Take 1 capsule (500 mg total) by mouth 3 (three) times a day for 7 days       History of Present Illness     No pain, not sure if she injured it or might have an insect bite.  Has had recurrent gout at first MTP, wanted to steroid therapy was good for a few days until swelling and redness and pain in her third toe.  No history of trauma.  She was not sure if perhaps she had had an insect sting or bite although she could see no bite terry  Has itp, usually with NSAIDs but she did take an Advil or 2 to the which gave her mild pain relief.  To him his previous note states he was considering allopurinol if she got gout again        Review of Systems    Objective     /78 (BP Location: Left arm, Patient Position: Sitting, Cuff Size: Large)   Pulse 74   Temp 97.9 °F (36.6 °C) (Tympanic)   Ht 5' 2.5\" (1.588 m)   Wt 63.4 kg (139 lb 12.8 oz)   SpO2 98%   BMI 25.16 kg/m²     Physical Exam  Musculoskeletal:      Comments: Left foot third toe weakly hyperemic and swollen and tender to touch with and pain with joint movement.  No evidence of any kind of puncture or insect bite terry   Neurological:      Mental Status: She is alert.   Psychiatric:         Mood and Affect: Mood normal.       Administrative Statements           "

## 2024-07-31 ENCOUNTER — TELEPHONE (OUTPATIENT)
Age: 69
End: 2024-07-31

## 2024-07-31 ENCOUNTER — RA CDI HCC (OUTPATIENT)
Dept: OTHER | Facility: HOSPITAL | Age: 69
End: 2024-07-31

## 2024-07-31 NOTE — TELEPHONE ENCOUNTER
Pt was given prednisone and antibiotics  for gout she now finish the prednisone wanted to know if she should started the antibiotics pls advise.

## 2024-08-06 ENCOUNTER — APPOINTMENT (OUTPATIENT)
Dept: RADIOLOGY | Facility: MEDICAL CENTER | Age: 69
End: 2024-08-06
Payer: MEDICARE

## 2024-08-06 ENCOUNTER — OFFICE VISIT (OUTPATIENT)
Dept: FAMILY MEDICINE CLINIC | Facility: CLINIC | Age: 69
End: 2024-08-06
Payer: MEDICARE

## 2024-08-06 VITALS
TEMPERATURE: 97.7 F | HEIGHT: 63 IN | BODY MASS INDEX: 24.52 KG/M2 | WEIGHT: 138.4 LBS | DIASTOLIC BLOOD PRESSURE: 68 MMHG | OXYGEN SATURATION: 99 % | SYSTOLIC BLOOD PRESSURE: 148 MMHG | HEART RATE: 57 BPM

## 2024-08-06 DIAGNOSIS — M10.9 ACUTE GOUT OF LEFT FOOT, UNSPECIFIED CAUSE: Primary | ICD-10-CM

## 2024-08-06 DIAGNOSIS — M10.072 ACUTE IDIOPATHIC GOUT INVOLVING TOE OF LEFT FOOT: Primary | ICD-10-CM

## 2024-08-06 DIAGNOSIS — M10.072 ACUTE IDIOPATHIC GOUT INVOLVING TOE OF LEFT FOOT: ICD-10-CM

## 2024-08-06 DIAGNOSIS — M10.9 ACUTE GOUT OF LEFT FOOT, UNSPECIFIED CAUSE: ICD-10-CM

## 2024-08-06 PROCEDURE — 87070 CULTURE OTHR SPECIMN AEROBIC: CPT | Performed by: FAMILY MEDICINE

## 2024-08-06 PROCEDURE — G2211 COMPLEX E/M VISIT ADD ON: HCPCS | Performed by: FAMILY MEDICINE

## 2024-08-06 PROCEDURE — 99213 OFFICE O/P EST LOW 20 MIN: CPT | Performed by: FAMILY MEDICINE

## 2024-08-06 PROCEDURE — 87205 SMEAR GRAM STAIN: CPT | Performed by: FAMILY MEDICINE

## 2024-08-06 PROCEDURE — 73630 X-RAY EXAM OF FOOT: CPT

## 2024-08-06 RX ORDER — PREDNISONE 10 MG/1
TABLET ORAL
Qty: 20 TABLET | Refills: 1 | Status: SHIPPED | OUTPATIENT
Start: 2024-08-06

## 2024-08-06 RX ORDER — ALLOPURINOL 100 MG/1
100 TABLET ORAL DAILY
Qty: 30 TABLET | Refills: 5 | Status: SHIPPED | OUTPATIENT
Start: 2024-08-06

## 2024-08-06 NOTE — ASSESSMENT & PLAN NOTE
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    since symptoms recently recurrent now with tophi and with elevated uric acid level of 8.5 will begin allopurinol 100 mg daily.  Warned pt this could again cause exacerbation therefore will continue prednisone 10 mg daily at least for the next week.  I advised warm soaks 2-3 times a day followed by bacitracin and bandage  recheck OV 1 week   Envcouraged cont \"gout\" diet  "

## 2024-08-06 NOTE — ADDENDUM NOTE
Addended by: SALTY DOMINGO on: 8/6/2024 01:14 PM     Modules accepted: Orders    
Addended by: SALTY DOMINGO on: 8/6/2024 01:20 PM     Modules accepted: Orders    
WDL

## 2024-08-06 NOTE — PROGRESS NOTES
"Ambulatory Visit  Name: Ashley Phillips      : 1955      MRN: [id number]  Encounter Provider: Sera Perry MD  Encounter Date: 2024   Encounter department: [facility]    Assessment & Plan   1. 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    since symptoms recently recurrent now with tophi and with elevated uric acid level of 8.5 will begin allopurinol 100 mg daily.  Warned pt this could again cause exacerbation therefore will continue prednisone 10 mg daily at least for the next week.  I advised warm soaks 2-3 times a day followed by bacitracin and bandage  recheck OV 1 week   Envcouraged cont \"gout\" diet  Orders:  -     XR foot 2 vw left; Future; Expected date: 2024  -     allopurinol (ZYLOPRIM) 100 mg tablet; Take 1 tablet (100 mg total) by mouth daily  -     predniSONE 10 mg tablet; Take 4 daily for 4 days, then 3 daily for 4 days, then 2 daily for 4 days, then 1 daily for 4 days. Take with food.  -     Wound culture and Gram stain; Future       History of Present Illness     Toe with slightly less discoloration but still swollen and painful and now with the white \"spot\" which has developed over the past several days.  Patient is finishing the antibiotic and her prednisone today she is watching her diet carefully.  She denies any fever        Review of Systems    Objective     /68 (BP Location: Left arm, Patient Position: Sitting, Cuff Size: Large)   Pulse 57   Temp 97.7 °F (36.5 °C) (Tympanic)   Ht 5' 2.5\" (1.588 m)   Wt 62.8 kg (138 lb 6.4 oz)   SpO2 99%   BMI 24.91 kg/m²     Physical Exam  Cardiovascular:      Pulses:           Dorsalis pedis pulses are 2+ on the left " side.        Posterior tibial pulses are 2+ on the left side.   Musculoskeletal:        Feet:        Administrative Statements

## 2024-08-10 LAB
BACTERIA WND AEROBE CULT: NO GROWTH
GRAM STN SPEC: NORMAL

## 2024-08-12 DIAGNOSIS — M10.072 ACUTE IDIOPATHIC GOUT INVOLVING TOE OF LEFT FOOT: Primary | ICD-10-CM

## 2024-08-16 ENCOUNTER — OFFICE VISIT (OUTPATIENT)
Dept: FAMILY MEDICINE CLINIC | Facility: CLINIC | Age: 69
End: 2024-08-16
Payer: MEDICARE

## 2024-08-16 VITALS
OXYGEN SATURATION: 92 % | DIASTOLIC BLOOD PRESSURE: 72 MMHG | HEIGHT: 63 IN | SYSTOLIC BLOOD PRESSURE: 132 MMHG | TEMPERATURE: 97.9 F | WEIGHT: 139.8 LBS | HEART RATE: 78 BPM | BODY MASS INDEX: 24.77 KG/M2

## 2024-08-16 DIAGNOSIS — M1A.0721 CHRONIC IDIOPATHIC GOUT INVOLVING TOE OF LEFT FOOT WITH TOPHUS: Primary | ICD-10-CM

## 2024-08-16 PROCEDURE — G2211 COMPLEX E/M VISIT ADD ON: HCPCS | Performed by: FAMILY MEDICINE

## 2024-08-16 PROCEDURE — 99213 OFFICE O/P EST LOW 20 MIN: CPT | Performed by: FAMILY MEDICINE

## 2024-08-16 NOTE — PROGRESS NOTES
"Ambulatory Visit  Name: Ashley Phillips      : 1955      MRN: [id number]  Encounter Provider: Sera Perry MD  Encounter Date: 2024   Encounter department: [facility]    Assessment & Plan   1. 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  Assessment & Plan:  Gout with tophus - now improved    area debrided; bacitracin and bandaid applied   pt to cont daily soaks for next several days followed by antibx ointment and bandage    Cont same dose allopurinol and repeat level in 1 month    Cont pred x next 4 days then stop    Call with any problems       History of Present Illness     States toe without pain and with less swelling and discoloration   Still soaking and was able to express a little more white material  On the allopurinol for 10 days without problems - still taking the pred 10 mg daily        Review of Systems    Objective     /72 (BP Location: Left arm, Patient Position: Sitting, Cuff Size: Large)   Pulse 78   Temp 97.9 °F (36.6 °C) (Tympanic)   Ht 5' 2.5\" (1.588 m)   Wt 63.4 kg (139 lb 12.8 oz)   SpO2 92%   BMI 25.16 kg/m²     Physical Exam  Musculoskeletal:        Feet:        Administrative Statements           "

## 2024-08-16 NOTE — ASSESSMENT & PLAN NOTE
Gout with tophus - now improved    area debrided; bacitracin and bandaid applied   pt to cont daily soaks for next several days followed by antibx ointment and bandage    Cont same dose allopurinol and repeat level in 1 month    Cont pred x next 4 days then stop    Call with any problems

## 2024-08-28 DIAGNOSIS — M10.072 ACUTE IDIOPATHIC GOUT INVOLVING TOE OF LEFT FOOT: ICD-10-CM

## 2024-08-28 RX ORDER — ALLOPURINOL 100 MG/1
100 TABLET ORAL DAILY
Qty: 90 TABLET | Refills: 1 | Status: SHIPPED | OUTPATIENT
Start: 2024-08-28

## 2024-09-08 DIAGNOSIS — I10 ESSENTIAL HYPERTENSION: ICD-10-CM

## 2024-09-08 RX ORDER — ATENOLOL 50 MG/1
50 TABLET ORAL 2 TIMES DAILY
Qty: 180 TABLET | Refills: 1 | Status: SHIPPED | OUTPATIENT
Start: 2024-09-08

## 2024-09-20 ENCOUNTER — APPOINTMENT (OUTPATIENT)
Dept: LAB | Facility: MEDICAL CENTER | Age: 69
End: 2024-09-20
Payer: MEDICARE

## 2024-09-20 DIAGNOSIS — M10.072 ACUTE IDIOPATHIC GOUT INVOLVING TOE OF LEFT FOOT: ICD-10-CM

## 2024-09-20 LAB — URATE SERPL-MCNC: 6.1 MG/DL (ref 2–7.5)

## 2024-09-20 PROCEDURE — 84550 ASSAY OF BLOOD/URIC ACID: CPT

## 2024-09-20 PROCEDURE — 36415 COLL VENOUS BLD VENIPUNCTURE: CPT

## 2024-09-25 ENCOUNTER — OFFICE VISIT (OUTPATIENT)
Dept: URGENT CARE | Facility: MEDICAL CENTER | Age: 69
End: 2024-09-25
Payer: MEDICARE

## 2024-09-25 VITALS
DIASTOLIC BLOOD PRESSURE: 62 MMHG | HEART RATE: 67 BPM | OXYGEN SATURATION: 97 % | TEMPERATURE: 97.7 F | RESPIRATION RATE: 18 BRPM | SYSTOLIC BLOOD PRESSURE: 100 MMHG

## 2024-09-25 DIAGNOSIS — R06.2 WHEEZING: Primary | ICD-10-CM

## 2024-09-25 DIAGNOSIS — J20.9 ACUTE BRONCHITIS, UNSPECIFIED ORGANISM: ICD-10-CM

## 2024-09-25 PROCEDURE — G0463 HOSPITAL OUTPT CLINIC VISIT: HCPCS

## 2024-09-25 PROCEDURE — 99213 OFFICE O/P EST LOW 20 MIN: CPT

## 2024-09-25 RX ORDER — ALBUTEROL SULFATE 90 UG/1
2 INHALANT RESPIRATORY (INHALATION) EVERY 6 HOURS PRN
Qty: 8.5 G | Refills: 0 | Status: SHIPPED | OUTPATIENT
Start: 2024-09-25

## 2024-09-25 RX ORDER — SULFAMETHOXAZOLE/TRIMETHOPRIM 800-160 MG
1 TABLET ORAL EVERY 12 HOURS SCHEDULED
Qty: 10 TABLET | Refills: 0 | Status: SHIPPED | OUTPATIENT
Start: 2024-09-25 | End: 2024-09-30

## 2024-09-25 RX ORDER — METHYLPREDNISOLONE 4 MG
TABLET, DOSE PACK ORAL
Qty: 21 TABLET | Refills: 0 | Status: SHIPPED | OUTPATIENT
Start: 2024-09-25 | End: 2024-10-01

## 2024-09-25 RX ORDER — BENZONATATE 200 MG/1
200 CAPSULE ORAL 3 TIMES DAILY PRN
Qty: 20 CAPSULE | Refills: 0 | Status: SHIPPED | OUTPATIENT
Start: 2024-09-25

## 2024-09-25 NOTE — PROGRESS NOTES
[facility]        NAME: Ashley Phillips is a 69 y.o. female  : 1955    MRN: [id number]  DATE: 2024  TIME: 2:09 PM    Assessment and Plan   Wheezing [R06.2]  1. Wheezing  methylPREDNISolone 4 MG tablet therapy pack    albuterol (ProAir HFA) 90 mcg/act inhaler      2. Acute bronchitis, unspecified organism  sulfamethoxazole-trimethoprim (BACTRIM DS) 800-160 mg per tablet    methylPREDNISolone 4 MG tablet therapy pack    benzonatate (TESSALON) 200 MG capsule            Patient Instructions     Take full course of Bactrim as prescribed  Eat yogurt with live and active cultures and/or take a probiotic at least 3 hours before or after antibiotic dose. Monitor stool for diarrhea and/or blood. If this occurs, contact primary care doctor ASAP.   Take Medrol dose pack as prescribed - follow taper instructions and take in the morning  Take tessalon perles as prescribed for cough  Use Albuterol inhaler as prescribed for wheezing.     Take over the counter Mucinex during the day  Take over the counter cough suppressant at night  Fluids and rest (Warm water with honey and lemon)  Tylenol/Ibuprofen for pain fever    Follow up with PCP in 3-5 days.  Proceed to  ER if symptoms worsen.    If tests are performed, our office will contact you with results only if changes need to made to the care plan discussed with you at the visit. You can [facility].    Chief Complaint     Chief Complaint   Patient presents with   • Nasal Congestion   • Cough   • Wheezing         History of Present Illness       69-year-old female arrives reporting 5 days of ongoing nasal congestion cough and wheezing.  Patient reports that she watches her 2 grandchildren which were recently sick with similar symptoms.  Patient reports her cough is now worsening and she is now having wheezing with the cough as well.  Patient reports a history of having this in the past and has needed inhalers to help  with her wheezing.  Patient reports the cough is worse at night.  Patient denies any shortness of breath, chest pain or abdominal pain.  Patient denies any fevers.    Cough  Associated symptoms include rhinorrhea and wheezing. Pertinent negatives include no chest pain, chills, ear pain, fever, myalgias, sore throat or shortness of breath.   Wheezing  Associated symptoms include coughing, rhinorrhea and wheezing. Pertinent negatives include no chest pain, fatigue or sore throat.       Review of Systems   Review of Systems   Constitutional:  Negative for chills, diaphoresis, fatigue and fever.   HENT:  Positive for congestion and rhinorrhea. Negative for ear pain, sinus pressure, sinus pain and sore throat.    Respiratory:  Positive for cough and wheezing. Negative for shortness of breath.    Cardiovascular:  Negative for chest pain.   Gastrointestinal:  Negative for abdominal pain.   Musculoskeletal:  Negative for myalgias.         Current Medications       Current Outpatient Medications:   •  albuterol (ProAir HFA) 90 mcg/act inhaler, Inhale 2 puffs every 6 (six) hours as needed for wheezing, Disp: 8.5 g, Rfl: 0  •  allopurinol (ZYLOPRIM) 100 mg tablet, TAKE 1 TABLET BY MOUTH DAILY, Disp: 90 tablet, Rfl: 1  •  atenolol (TENORMIN) 50 mg tablet, TAKE 1 TABLET BY MOUTH TWICE A DAY, Disp: 180 tablet, Rfl: 1  •  benzonatate (TESSALON) 200 MG capsule, Take 1 capsule (200 mg total) by mouth 3 (three) times a day as needed for cough, Disp: 20 capsule, Rfl: 0  •  Biotin 1000 MCG CHEW, Chew daily, Disp: , Rfl:   •  Cholecalciferol (Vitamin D3) 50 MCG (2000 UT) TABS, Take 2,000 Units by mouth daily, Disp: , Rfl:   •  cyanocobalamin (VITAMIN B-12) 1000 MCG tablet, Take 1,000 mcg by mouth daily, Disp: , Rfl:   •  levothyroxine 100 mcg tablet, take 1 tablet by mouth 6 days  PER WEEK, 1/2 TABELT ONE DAY A WEEK, Disp: , Rfl:   •  lisinopril (ZESTRIL) 10 mg tablet, TAKE 1 TABLET BY MOUTH EVERY DAY, Disp: 90 tablet, Rfl: 1  •   methylPREDNISolone 4 MG tablet therapy pack, Take 6 tablets (24 mg total) by mouth daily for 1 day, THEN 5 tablets (20 mg total) daily for 1 day, THEN 4 tablets (16 mg total) daily for 1 day, THEN 3 tablets (12 mg total) daily for 1 day, THEN 2 tablets (8 mg total) daily for 1 day, THEN 1 tablet (4 mg total) daily for 1 day. Use as directed on package., Disp: 21 tablet, Rfl: 0  •  sulfamethoxazole-trimethoprim (BACTRIM DS) 800-160 mg per tablet, Take 1 tablet by mouth every 12 (twelve) hours for 5 days, Disp: 10 tablet, Rfl: 0  •  methylPREDNISolone 4 MG tablet therapy pack, Use as directed on package (Patient not taking: Reported on 9/25/2024), Disp: 21 each, Rfl: 0  •  oxyCODONE-acetaminophen (Percocet) 5-325 mg per tablet, Take 1 tablet by mouth every 4 (four) hours as needed for moderate pain Max Daily Amount: 6 tablets (Patient not taking: Reported on 6/26/2024), Disp: 30 tablet, Rfl: 0  •  predniSONE 10 mg tablet, Take 4 daily for 4 days, then 3 daily for 4 days, then 2 daily for 4 days, then 1 daily for 4 days. Take with food. (Patient not taking: Reported on 9/25/2024), Disp: 20 tablet, Rfl: 1    Current Allergies     Allergies as of 09/25/2024 - Reviewed 09/25/2024   Allergen Reaction Noted   • Ciprofloxacin Lip Swelling 12/09/2019   • Erythromycin Other (See Comments) 12/09/2019   • Penicillins Swelling 12/09/2019            The following portions of the patient's history were reviewed and updated as appropriate: allergies, current medications, past family history, past medical history, past social history, past surgical history and problem list.     Past Medical History:   Diagnosis Date   • Disease of thyroid gland    • Hypertension        Past Surgical History:   Procedure Laterality Date   • COLONOSCOPY  10/26/2017    Dr. Guevara - polyp in sigmoid colon removed; moderate diverticulosis in sigmoid colon. Bx: tubular adenoma.   • COLONOSCOPY  08/03/2012    Dr. Guevara - polyp found in sigmoid  colon removed. Moderately severe diverticulosis in the sigmoid colon. Bx: tubular adenoma.   • HYSTERECTOMY         Family History   Problem Relation Age of Onset   • Leukemia Mother         chronic lymphocytic leukemia   • Hashimoto's thyroiditis Mother    • Diabetes type II Mother    • Lung cancer Father    • No Known Problems Daughter    • No Known Problems Maternal Grandmother    • No Known Problems Maternal Grandfather    • No Known Problems Paternal Grandmother    • No Known Problems Paternal Grandfather    • No Known Problems Brother    • No Known Problems Son    • No Known Problems Maternal Aunt    • No Known Problems Paternal Aunt    • Breast cancer Cousin          Medications have been verified.        Objective   /62   Pulse 67   Temp 97.7 °F (36.5 °C)   Resp 18   SpO2 97%        Physical Exam     Physical Exam  Vitals and nursing note reviewed.   Constitutional:       General: She is not in acute distress.     Appearance: Normal appearance. She is not ill-appearing.   HENT:      Head: Normocephalic.      Right Ear: Tympanic membrane, ear canal and external ear normal.      Left Ear: Tympanic membrane, ear canal and external ear normal.      Nose: Congestion present.      Mouth/Throat:      Mouth: Mucous membranes are moist.      Pharynx: No oropharyngeal exudate or posterior oropharyngeal erythema.   Eyes:      Extraocular Movements: Extraocular movements intact.      Pupils: Pupils are equal, round, and reactive to light.   Cardiovascular:      Rate and Rhythm: Normal rate and regular rhythm.      Pulses: Normal pulses.      Heart sounds: Normal heart sounds.   Pulmonary:      Effort: Pulmonary effort is normal. No respiratory distress.      Breath sounds: No stridor. Wheezing present. No rhonchi or rales.   Chest:      Chest wall: No tenderness.   Musculoskeletal:         General: Normal range of motion.      Cervical back: Normal range of motion and neck supple.   Lymphadenopathy:       Cervical: No cervical adenopathy.   Skin:     General: Skin is warm and dry.      Capillary Refill: Capillary refill takes less than 2 seconds.   Neurological:      General: No focal deficit present.      Mental Status: She is alert and oriented to person, place, and time.   Psychiatric:         Mood and Affect: Mood normal.         Behavior: Behavior normal.

## 2024-09-25 NOTE — PATIENT INSTRUCTIONS
Take full course of Bactrim as prescribed  Eat yogurt with live and active cultures and/or take a probiotic at least 3 hours before or after antibiotic dose. Monitor stool for diarrhea and/or blood. If this occurs, contact primary care doctor ASAP.   Take Medrol dose pack as prescribed - follow taper instructions and take in the morning  Take tessalon perles as prescribed for cough  Use Albuterol inhaler as prescribed for wheezing.     Take over the counter Mucinex during the day  Take over the counter cough suppressant at night  Fluids and rest (Warm water with honey and lemon)  Tylenol/Ibuprofen for pain fever    Follow up with PCP in 3-5 days.  Proceed to  ER if symptoms worsen.    If tests are performed, our office will contact you with results only if changes need to made to the care plan discussed with you at the visit. You can review your full results on [facility].

## 2024-10-11 DIAGNOSIS — I10 ESSENTIAL HYPERTENSION: ICD-10-CM

## 2024-10-11 RX ORDER — LISINOPRIL 10 MG/1
10 TABLET ORAL DAILY
Qty: 90 TABLET | Refills: 1 | Status: SHIPPED | OUTPATIENT
Start: 2024-10-11

## 2024-10-22 ENCOUNTER — OFFICE VISIT (OUTPATIENT)
Dept: URGENT CARE | Facility: MEDICAL CENTER | Age: 69
End: 2024-10-22
Payer: MEDICARE

## 2024-10-22 VITALS
SYSTOLIC BLOOD PRESSURE: 128 MMHG | HEART RATE: 87 BPM | WEIGHT: 137 LBS | TEMPERATURE: 98 F | BODY MASS INDEX: 24.66 KG/M2 | OXYGEN SATURATION: 98 % | DIASTOLIC BLOOD PRESSURE: 76 MMHG | RESPIRATION RATE: 22 BRPM

## 2024-10-22 DIAGNOSIS — R05.1 ACUTE COUGH: ICD-10-CM

## 2024-10-22 DIAGNOSIS — J01.90 ACUTE NON-RECURRENT SINUSITIS, UNSPECIFIED LOCATION: ICD-10-CM

## 2024-10-22 LAB
SARS-COV-2 AG UPPER RESP QL IA: NEGATIVE
VALID CONTROL: NORMAL

## 2024-10-22 PROCEDURE — 99212 OFFICE O/P EST SF 10 MIN: CPT | Performed by: PHYSICIAN ASSISTANT

## 2024-10-22 PROCEDURE — G0463 HOSPITAL OUTPT CLINIC VISIT: HCPCS | Performed by: PHYSICIAN ASSISTANT

## 2024-10-22 PROCEDURE — 87811 SARS-COV-2 COVID19 W/OPTIC: CPT | Performed by: PHYSICIAN ASSISTANT

## 2024-10-22 RX ORDER — CEPHALEXIN 500 MG/1
500 CAPSULE ORAL EVERY 8 HOURS SCHEDULED
Qty: 21 CAPSULE | Refills: 0 | Status: SHIPPED | OUTPATIENT
Start: 2024-10-22 | End: 2024-10-29

## 2024-10-22 NOTE — PATIENT INSTRUCTIONS
Start antibiotic  Over the counter cold medication to control symptoms  Drink plenty of fluids  Take Tylenol as needed for pain  If symptoms worsen have yourself rechecked

## 2024-12-20 ENCOUNTER — OFFICE VISIT (OUTPATIENT)
Dept: FAMILY MEDICINE CLINIC | Facility: CLINIC | Age: 69
End: 2024-12-20
Payer: MEDICARE

## 2024-12-20 VITALS
BODY MASS INDEX: 24.63 KG/M2 | HEIGHT: 63 IN | WEIGHT: 139 LBS | SYSTOLIC BLOOD PRESSURE: 136 MMHG | HEART RATE: 67 BPM | OXYGEN SATURATION: 98 % | DIASTOLIC BLOOD PRESSURE: 70 MMHG

## 2024-12-20 DIAGNOSIS — M1A.0721 CHRONIC IDIOPATHIC GOUT INVOLVING TOE OF LEFT FOOT WITH TOPHUS: ICD-10-CM

## 2024-12-20 DIAGNOSIS — M54.41 CHRONIC RIGHT-SIDED LOW BACK PAIN WITH RIGHT-SIDED SCIATICA: ICD-10-CM

## 2024-12-20 DIAGNOSIS — Z00.00 MEDICARE ANNUAL WELLNESS VISIT, SUBSEQUENT: Primary | ICD-10-CM

## 2024-12-20 DIAGNOSIS — Z12.31 ENCOUNTER FOR SCREENING MAMMOGRAM FOR BREAST CANCER: ICD-10-CM

## 2024-12-20 DIAGNOSIS — E55.9 VITAMIN D DEFICIENCY: ICD-10-CM

## 2024-12-20 DIAGNOSIS — G89.29 CHRONIC RIGHT-SIDED LOW BACK PAIN WITH RIGHT-SIDED SCIATICA: ICD-10-CM

## 2024-12-20 DIAGNOSIS — E89.0 POSTOPERATIVE HYPOTHYROIDISM: ICD-10-CM

## 2024-12-20 DIAGNOSIS — Z85.850 HISTORY OF THYROID CANCER: ICD-10-CM

## 2024-12-20 DIAGNOSIS — D69.6 THROMBOCYTOPENIA (HCC): ICD-10-CM

## 2024-12-20 DIAGNOSIS — I10 ESSENTIAL HYPERTENSION: ICD-10-CM

## 2024-12-20 DIAGNOSIS — R06.2 WHEEZE: ICD-10-CM

## 2024-12-20 PROCEDURE — G0439 PPPS, SUBSEQ VISIT: HCPCS | Performed by: FAMILY MEDICINE

## 2024-12-20 PROCEDURE — 99213 OFFICE O/P EST LOW 20 MIN: CPT | Performed by: FAMILY MEDICINE

## 2024-12-20 RX ORDER — ALBUTEROL SULFATE 90 UG/1
2 INHALANT RESPIRATORY (INHALATION) EVERY 6 HOURS PRN
Qty: 18 G | Refills: 5 | Status: SHIPPED | OUTPATIENT
Start: 2024-12-20

## 2024-12-20 NOTE — ASSESSMENT & PLAN NOTE
Routine labs ordered including thyroid studies done by Endo patient not interested in any further COVID vaccines at this time.  I did recommend Shingrix.  I do not feel she warrants RSV at this time unless she wishes especially since continuously caring for her grandchildren   I did encourage GYN follow-up since she still has her ovaries intact.  She denies any  complaints.  She will follow-up with Dr. Hernandez in 6 months

## 2024-12-20 NOTE — ASSESSMENT & PLAN NOTE
Controlled therefore to continue same medications and diet  Orders:  •  Lipid panel; Future  •  Comprehensive metabolic panel; Future

## 2024-12-20 NOTE — PROGRESS NOTES
Name: Ashley Phillips      : 1955      MRN: [id number]  Encounter Provider: Sera Perry MD  Encounter Date: 2024   Encounter department: [facility]    Assessment & Plan  Medicare annual wellness visit, subsequent  Routine labs ordered including thyroid studies done by Endo patient not interested in any further COVID vaccines at this time.  I did recommend Shingrix.  I do not feel she warrants RSV at this time unless she wishes especially since continuously caring for her grandchildren   I did encourage GYN follow-up since she still has her ovaries intact.  She denies any  complaints.  She will follow-up with Dr. Hernandez in 6 months       Essential hypertension  Controlled therefore to continue same medications and diet  Orders:  •  Lipid panel; Future  •  Comprehensive metabolic panel; Future    Chronic right-sided low back pain with right-sided sciatica  Last x-rays were done in 2019 therefore will repeat x-rays and go from there as to further studies and/or treatment  Orders:  •  XR spine lumbar minimum 4 views non injury; Future  •  XR pelvis complete 3+ vw; Future  •  XR hip/pelvis 4+ vw right if performed; Future    Wheeze  Likely due to allergies.  I did encourage a trial of Zyrtec when symptoms occur and did give her prescription for albuterol to take as needed.  Of course if symptoms worsen or become more frequent she knows to call  Orders:  •  albuterol (Ventolin HFA) 90 mcg/act inhaler; Inhale 2 puffs every 6 (six) hours as needed for wheezing    Postoperative hypothyroidism  Followed yearly by [facility] ( Dr. 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 They screened negative with a PHQ-2 score of 0.    Urinary Incontinence Plan of Care: counseling topics discussed: practice Kegel (pelvic floor strengthening) exercises and limit alcohol, caffeine, spicy foods, and acidic foods.       Preventive health issues were discussed with patient, and age appropriate screening tests were ordered as noted in patient's After Visit Summary. Personalized health advice and appropriate referrals for health education or preventive services given if needed, as noted in patient's After Visit Summary.    History of Present Illness     She was seen at urgent care x 2 several months ago due to a persistent cough with wheezing she was diagnosed with bronchitis and treated with Bactrim as well as a Pred pack and albuterol with marked improvement of symptoms however still notices occasional cough and wheeze, especially around certain smells.  This will also be accompanied by some rhinorrhea and postnasal drip.  She does takes care of 3 grandchildren who are always ill    She has had no problems with her gout still with discolored left second toe however without pain or recurrent lesions.       Patient Care Team:  Sulaiman Hernandez DO as PCP - General (Internal Medicine)    Review of Systems  Medical History Reviewed by provider this encounter:       Annual Wellness Visit Questionnaire   Ashley DASILVA is here for her Subsequent Wellness visit. Last Medicare Wellness visit information reviewed, patient interviewed and updates made to the record today.      Health Risk Assessment:   Patient rates overall health as very good. Patient feels that their physical health rating is same. Patient is very  satisfied with their life. Eyesight was rated as same. Hearing was rated as same. Patient feels that their emotional and mental health rating is same. Patients states they are never, rarely angry. Patient states they are sometimes unusually tired/fatigued. Pain experienced in the last 7 days has been some. Patient's pain rating has been 7/10. Patient states that she has experienced no weight loss or gain in last 6 months. Recurrent pain over right lower back radiating over lateral aspect of right hip and into right groin with occasional radiation into her thigh to her knee however without numbness.  She states symptoms are worse with activity.  She relief symptoms with Tylenol and topical agents.  Symptoms have been ongoing for quite some time; some days worse than other days.  She denies any recent or prior trauma    Depression Screening:   PHQ-2 Score: 0      Fall Risk Screening:   In the past year, patient has experienced: no history of falling in past year      Urinary Incontinence Screening:   Patient has leaked urine accidently in the last six months. Only with sneeze or cough    Home Safety:  Patient does not have trouble with stairs inside or outside of their home. Patient has working smoke alarms and has working carbon monoxide detector. Home safety hazards include: none.     Nutrition:   Current diet is Regular.     Medications:   Patient is currently taking over-the-counter supplements. OTC medications include: see medication list. Patient is able to manage medications.     Activities of Daily Living (ADLs)/Instrumental Activities of Daily Living (IADLs):   Walk and transfer into and out of bed and chair?: Yes  Dress and groom yourself?: Yes    Bathe or shower yourself?: Yes    Feed yourself? Yes  Do your laundry/housekeeping?: Yes  Manage your money, pay your bills and track your expenses?: Yes  Make your own meals?: Yes    Do your own shopping?: Yes    Previous Hospitalizations:   Any hospitalizations  or ED visits within the last 12 months?: No      Advance Care Planning:   Living will: Yes    Durable POA for healthcare: No    Advanced directive: Yes    Advanced directive counseling given: Yes    ACP document given: Yes    Patient declined ACP directive: No    End of Life Decisions reviewed with patient: Yes      Cognitive Screening:   Provider or family/friend/caregiver concerned regarding cognition?: No    PREVENTIVE SCREENINGS      Cardiovascular Screening:    General: History Lipid Disorder    Due for: Lipid Panel      Diabetes Screening:     General: Screening Current    Due for: Blood Glucose      Colorectal Cancer Screening:     General: Screening Current      Breast Cancer Screening:     General: Screening Current      Cervical Cancer Screening:    General: Screening Not Indicated      Osteoporosis Screening:    General: History Osteoporosis and Screening Current      Abdominal Aortic Aneurysm (AAA) Screening:        General: Screening Not Indicated      Lung Cancer Screening:     General: Screening Not Indicated    Screening, Brief Intervention, and Referral to Treatment (SBIRT)    Screening      Single Item Drug Screening:  How often have you used an illegal drug (including marijuana) or a prescription medication for non-medical reasons in the past year? never    Single Item Drug Screen Score: 0  Interpretation: Negative screen for possible drug use disorder    Review of Current Opioid Use    Opioid Risk Tool (ORT) Interpretation: Complete Opioid Risk Tool (ORT)    Other Counseling Topics:   Calcium and vitamin D intake and regular weightbearing exercise.     Social Drivers of Health     Financial Resource Strain: Low Risk  (12/18/2023)    Overall Financial Resource Strain (CARDIA)    • Difficulty of Paying Living Expenses: Not hard at all   Food Insecurity: No Food Insecurity (12/20/2024)    Hunger Vital Sign    • Worried About Running Out of Food in the Last Year: Never true    • Ran Out of Food in  "the Last Year: Never true   Transportation Needs: No Transportation Needs (12/20/2024)    PRAPARE - Transportation    • Lack of Transportation (Medical): No    • Lack of Transportation (Non-Medical): No   Housing Stability: Low Risk  (12/20/2024)    Housing Stability Vital Sign    • Unable to Pay for Housing in the Last Year: No    • Number of Times Moved in the Last Year: 0    • Homeless in the Last Year: No   Utilities: Not At Risk (12/20/2024)    [facility] Utilities    • Threatened with loss of utilities: No     No results found.    Objective   /70 (BP Location: Left arm, Patient Position: Sitting, Cuff Size: Adult)   Pulse 67   Ht 5' 2.5\" (1.588 m)   Wt 63 kg (139 lb)   SpO2 98%   BMI 25.02 kg/m²     Physical Exam  Vitals and nursing note reviewed.   Constitutional:       General: She is not in acute distress.     Appearance: Normal appearance. She is well-developed.   HENT:      Head: Normocephalic and atraumatic.   Eyes:      Conjunctiva/sclera: Conjunctivae normal.   Neck:      Vascular: No carotid bruit.   Cardiovascular:      Rate and Rhythm: Normal rate and regular rhythm.      Pulses: Normal pulses.      Heart sounds: Normal heart sounds. No murmur heard.  Pulmonary:      Effort: Pulmonary effort is normal. No respiratory distress.      Breath sounds: Normal breath sounds.      Comments: Lungs clear in both prone and supine positions  Abdominal:      Palpations: Abdomen is soft. There is no mass.      Tenderness: There is no abdominal tenderness.   Musculoskeletal:         General: No swelling.      Cervical back: Neck supple.      Right lower leg: No edema.      Left lower leg: No edema.        Legs:       Comments: Discomfort over lat aspect right hip with varus/valgus maneuvers with legs estended   Slight pain over lat aspect with inversion ( with knees flexed)   No TTP over L-s spine;S-I joint or lat aspect hip   Lymphadenopathy:      Cervical: No cervical adenopathy.   Skin:     General: Skin is " warm and dry.      Capillary Refill: Capillary refill takes less than 2 seconds.   Neurological:      Mental Status: She is alert and oriented to person, place, and time.      Motor: Motor function is intact.      Deep Tendon Reflexes:      Reflex Scores:       Patellar reflexes are 2+ on the right side and 2+ on the left side.       Achilles reflexes are 1+ on the right side and 2+ on the left side.  Psychiatric:         Mood and Affect: Mood normal.         Behavior: Behavior normal.

## 2024-12-20 NOTE — ASSESSMENT & PLAN NOTE
Likely due to allergies.  I did encourage a trial of Zyrtec when symptoms occur and did give her prescription for albuterol to take as needed.  Of course if symptoms worsen or become more frequent she knows to call  Orders:  •  albuterol (Ventolin HFA) 90 mcg/act inhaler; Inhale 2 puffs every 6 (six) hours as needed for wheezing

## 2024-12-20 NOTE — ASSESSMENT & PLAN NOTE
Last x-rays were done in 2019 therefore will repeat x-rays and go from there as to further studies and/or treatment  Orders:  •  XR spine lumbar minimum 4 views non injury; Future  •  XR pelvis complete 3+ vw; Future  •  XR hip/pelvis 4+ vw right if performed; Future

## 2024-12-20 NOTE — ASSESSMENT & PLAN NOTE
Followed yearly by Endo ( Dr. Vargas)  Orders:  •  TSH, 3rd generation with Free T4 reflex; Future

## 2024-12-23 ENCOUNTER — APPOINTMENT (OUTPATIENT)
Dept: LAB | Facility: MEDICAL CENTER | Age: 69
End: 2024-12-23
Payer: MEDICARE

## 2024-12-23 ENCOUNTER — APPOINTMENT (OUTPATIENT)
Dept: RADIOLOGY | Facility: MEDICAL CENTER | Age: 69
End: 2024-12-23
Payer: MEDICARE

## 2024-12-23 DIAGNOSIS — D69.6 THROMBOCYTOPENIA (HCC): ICD-10-CM

## 2024-12-23 DIAGNOSIS — I10 ESSENTIAL HYPERTENSION: ICD-10-CM

## 2024-12-23 DIAGNOSIS — G89.29 CHRONIC RIGHT-SIDED LOW BACK PAIN WITH RIGHT-SIDED SCIATICA: ICD-10-CM

## 2024-12-23 DIAGNOSIS — M54.41 CHRONIC RIGHT-SIDED LOW BACK PAIN WITH RIGHT-SIDED SCIATICA: ICD-10-CM

## 2024-12-23 DIAGNOSIS — E89.0 POSTOPERATIVE HYPOTHYROIDISM: ICD-10-CM

## 2024-12-23 LAB
ALBUMIN SERPL BCG-MCNC: 4.8 G/DL (ref 3.5–5)
ALP SERPL-CCNC: 64 U/L (ref 34–104)
ALT SERPL W P-5'-P-CCNC: 22 U/L (ref 7–52)
ANION GAP SERPL CALCULATED.3IONS-SCNC: 8 MMOL/L (ref 4–13)
AST SERPL W P-5'-P-CCNC: 29 U/L (ref 13–39)
BASOPHILS # BLD AUTO: 0.04 THOUSANDS/ÂΜL (ref 0–0.1)
BASOPHILS NFR BLD AUTO: 1 % (ref 0–1)
BILIRUB SERPL-MCNC: 1.23 MG/DL (ref 0.2–1)
BUN SERPL-MCNC: 15 MG/DL (ref 5–25)
CALCIUM SERPL-MCNC: 10.1 MG/DL (ref 8.4–10.2)
CHLORIDE SERPL-SCNC: 101 MMOL/L (ref 96–108)
CHOLEST SERPL-MCNC: 202 MG/DL (ref ?–200)
CO2 SERPL-SCNC: 31 MMOL/L (ref 21–32)
CREAT SERPL-MCNC: 0.93 MG/DL (ref 0.6–1.3)
EOSINOPHIL # BLD AUTO: 0.27 THOUSAND/ÂΜL (ref 0–0.61)
EOSINOPHIL NFR BLD AUTO: 6 % (ref 0–6)
ERYTHROCYTE [DISTWIDTH] IN BLOOD BY AUTOMATED COUNT: 14 % (ref 11.6–15.1)
GFR SERPL CREATININE-BSD FRML MDRD: 62 ML/MIN/1.73SQ M
GLUCOSE P FAST SERPL-MCNC: 109 MG/DL (ref 65–99)
HCT VFR BLD AUTO: 41.9 % (ref 34.8–46.1)
HDLC SERPL-MCNC: 37 MG/DL
HGB BLD-MCNC: 13.2 G/DL (ref 11.5–15.4)
IMM GRANULOCYTES # BLD AUTO: 0.01 THOUSAND/UL (ref 0–0.2)
IMM GRANULOCYTES NFR BLD AUTO: 0 % (ref 0–2)
LDLC SERPL CALC-MCNC: 126 MG/DL (ref 0–100)
LYMPHOCYTES # BLD AUTO: 1.32 THOUSANDS/ÂΜL (ref 0.6–4.47)
LYMPHOCYTES NFR BLD AUTO: 28 % (ref 14–44)
MCH RBC QN AUTO: 30.1 PG (ref 26.8–34.3)
MCHC RBC AUTO-ENTMCNC: 31.5 G/DL (ref 31.4–37.4)
MCV RBC AUTO: 96 FL (ref 82–98)
MONOCYTES # BLD AUTO: 0.46 THOUSAND/ÂΜL (ref 0.17–1.22)
MONOCYTES NFR BLD AUTO: 10 % (ref 4–12)
NEUTROPHILS # BLD AUTO: 2.59 THOUSANDS/ÂΜL (ref 1.85–7.62)
NEUTS SEG NFR BLD AUTO: 55 % (ref 43–75)
NONHDLC SERPL-MCNC: 165 MG/DL
NRBC BLD AUTO-RTO: 0 /100 WBCS
PLATELET # BLD AUTO: 113 THOUSANDS/UL (ref 149–390)
PMV BLD AUTO: 12.7 FL (ref 8.9–12.7)
POTASSIUM SERPL-SCNC: 5.2 MMOL/L (ref 3.5–5.3)
PROT SERPL-MCNC: 7.1 G/DL (ref 6.4–8.4)
RBC # BLD AUTO: 4.38 MILLION/UL (ref 3.81–5.12)
SODIUM SERPL-SCNC: 140 MMOL/L (ref 135–147)
TRIGL SERPL-MCNC: 194 MG/DL (ref ?–150)
TSH SERPL DL<=0.05 MIU/L-ACNC: 3.99 UIU/ML (ref 0.45–4.5)
WBC # BLD AUTO: 4.69 THOUSAND/UL (ref 4.31–10.16)

## 2024-12-23 PROCEDURE — 72110 X-RAY EXAM L-2 SPINE 4/>VWS: CPT

## 2024-12-23 PROCEDURE — 80053 COMPREHEN METABOLIC PANEL: CPT

## 2024-12-23 PROCEDURE — 85025 COMPLETE CBC W/AUTO DIFF WBC: CPT

## 2024-12-23 PROCEDURE — 36415 COLL VENOUS BLD VENIPUNCTURE: CPT

## 2024-12-23 PROCEDURE — 73502 X-RAY EXAM HIP UNI 2-3 VIEWS: CPT

## 2024-12-23 PROCEDURE — 80061 LIPID PANEL: CPT

## 2024-12-23 PROCEDURE — 84443 ASSAY THYROID STIM HORMONE: CPT

## 2024-12-24 ENCOUNTER — RESULTS FOLLOW-UP (OUTPATIENT)
Dept: FAMILY MEDICINE CLINIC | Facility: CLINIC | Age: 69
End: 2024-12-24

## 2024-12-24 DIAGNOSIS — M16.11 PRIMARY OSTEOARTHRITIS OF RIGHT HIP: Primary | ICD-10-CM

## 2024-12-24 NOTE — TELEPHONE ENCOUNTER
Pt called in stating she received a text alert from [facility] to schedule an appt.     Pt stated she she only wants to go to [facility]. Wanted to confirm that. Informed pt referral was placed to Anurag Talamantes. Pt stated he is with [facility]. Confirmed with pt Dr. Talamantes also works with [facility]. Pt understood.     Pt requests if we could please fax over her XRAY's to [facility], as she does not utilize [facility], and that she would also like a copy of them as well.     Please advise & call pt back with update. Thank you!    Ashley Phlilips: [phone]

## 2025-01-19 PROBLEM — Z00.00 MEDICARE ANNUAL WELLNESS VISIT, SUBSEQUENT: Status: RESOLVED | Noted: 2023-12-18 | Resolved: 2025-01-19

## 2025-01-29 ENCOUNTER — APPOINTMENT (OUTPATIENT)
Dept: LAB | Facility: MEDICAL CENTER | Age: 70
End: 2025-01-29
Payer: MEDICARE

## 2025-01-29 DIAGNOSIS — E89.0 POSTOPERATIVE HYPOTHYROIDISM: ICD-10-CM

## 2025-01-29 DIAGNOSIS — Z85.850 HISTORY OF THYROID CANCER: ICD-10-CM

## 2025-01-29 PROCEDURE — 86800 THYROGLOBULIN ANTIBODY: CPT

## 2025-01-29 PROCEDURE — 84432 ASSAY OF THYROGLOBULIN: CPT

## 2025-02-06 LAB
THYROGLOB AB SERPL-ACNC: 5.4 IU/ML (ref 0–0.9)
THYROGLOB SERPL-MCNC: <2 NG/ML

## 2025-02-21 ENCOUNTER — OFFICE VISIT (OUTPATIENT)
Dept: FAMILY MEDICINE CLINIC | Facility: CLINIC | Age: 70
End: 2025-02-21
Payer: MEDICARE

## 2025-02-21 ENCOUNTER — APPOINTMENT (OUTPATIENT)
Age: 70
End: 2025-02-21
Payer: MEDICARE

## 2025-02-21 VITALS
HEART RATE: 68 BPM | DIASTOLIC BLOOD PRESSURE: 118 MMHG | SYSTOLIC BLOOD PRESSURE: 168 MMHG | TEMPERATURE: 96.6 F | OXYGEN SATURATION: 98 %

## 2025-02-21 DIAGNOSIS — I10 ESSENTIAL HYPERTENSION: Primary | ICD-10-CM

## 2025-02-21 DIAGNOSIS — M10.00 ACUTE IDIOPATHIC GOUT, UNSPECIFIED SITE: ICD-10-CM

## 2025-02-21 DIAGNOSIS — M16.11 ARTHRITIS OF RIGHT HIP: ICD-10-CM

## 2025-02-21 DIAGNOSIS — E89.0 POST-SURGICAL HYPOTHYROIDISM: ICD-10-CM

## 2025-02-21 DIAGNOSIS — C73 THYROID CANCER (HCC): ICD-10-CM

## 2025-02-21 DIAGNOSIS — M10.072 ACUTE IDIOPATHIC GOUT INVOLVING TOE OF LEFT FOOT: ICD-10-CM

## 2025-02-21 DIAGNOSIS — D69.3 AUTOIMMUNE THROMBOCYTOPENIA (HCC): ICD-10-CM

## 2025-02-21 DIAGNOSIS — I10 ESSENTIAL HYPERTENSION: ICD-10-CM

## 2025-02-21 DIAGNOSIS — R51.9 RECURRENT OCCIPITAL HEADACHE: ICD-10-CM

## 2025-02-21 LAB
ANION GAP SERPL CALCULATED.3IONS-SCNC: 10 MMOL/L (ref 4–13)
BUN SERPL-MCNC: 11 MG/DL (ref 5–25)
CALCIUM SERPL-MCNC: 10.7 MG/DL (ref 8.4–10.2)
CHLORIDE SERPL-SCNC: 102 MMOL/L (ref 96–108)
CO2 SERPL-SCNC: 30 MMOL/L (ref 21–32)
CREAT SERPL-MCNC: 0.8 MG/DL (ref 0.6–1.3)
GFR SERPL CREATININE-BSD FRML MDRD: 74 ML/MIN/1.73SQ M
GLUCOSE SERPL-MCNC: 108 MG/DL (ref 65–140)
POTASSIUM SERPL-SCNC: 4.4 MMOL/L (ref 3.5–5.3)
SODIUM SERPL-SCNC: 142 MMOL/L (ref 135–147)
URATE SERPL-MCNC: 6.4 MG/DL (ref 2–7.5)

## 2025-02-21 PROCEDURE — 84550 ASSAY OF BLOOD/URIC ACID: CPT

## 2025-02-21 PROCEDURE — 36415 COLL VENOUS BLD VENIPUNCTURE: CPT

## 2025-02-21 PROCEDURE — 80048 BASIC METABOLIC PNL TOTAL CA: CPT

## 2025-02-21 PROCEDURE — G2211 COMPLEX E/M VISIT ADD ON: HCPCS | Performed by: INTERNAL MEDICINE

## 2025-02-21 PROCEDURE — 99214 OFFICE O/P EST MOD 30 MIN: CPT | Performed by: INTERNAL MEDICINE

## 2025-02-21 RX ORDER — AMLODIPINE BESYLATE 2.5 MG/1
2.5 TABLET ORAL DAILY
Qty: 100 TABLET | Refills: 3 | Status: SHIPPED | OUTPATIENT
Start: 2025-02-21

## 2025-02-21 RX ORDER — VALSARTAN 160 MG/1
160 TABLET ORAL DAILY
Qty: 100 TABLET | Refills: 3 | Status: SHIPPED | OUTPATIENT
Start: 2025-02-21

## 2025-02-21 RX ORDER — ALLOPURINOL 100 MG/1
100 TABLET ORAL DAILY
Qty: 90 TABLET | Refills: 1 | Status: SHIPPED | OUTPATIENT
Start: 2025-02-21

## 2025-02-21 NOTE — ASSESSMENT & PLAN NOTE
Given progressive headache, associated migraine with visual disturbance, will move to CT of the head to ensure no aneurysmal disease.  She has no nuchal rigidity per se.  There is no thunderclap headache.  Orders:  •  CTA head and neck w wo contrast; Future

## 2025-02-21 NOTE — ASSESSMENT & PLAN NOTE
"Patient notably has an ACE induced cough by history but she is just tolerated.  For now we will change her lisinopril to valsartan at 160 mg daily.  I have also added amlodipine at 2.5 mg daily and will continue her metoprolol.  Etiology of her current \"spike\" and blood pressure uncertain.  She has not been taking any new medications.  Wonder how long she has actually been controlled.  She has just started monitoring her blood pressure since this headache has kicked in.  Orders:  •  Basic metabolic panel; Future  •  valsartan (DIOVAN) 160 mg tablet; Take 1 tablet (160 mg total) by mouth daily  •  amLODIPine (NORVASC) 2.5 mg tablet; Take 1 tablet (2.5 mg total) by mouth daily  "

## 2025-02-21 NOTE — ASSESSMENT & PLAN NOTE
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  Continues to follow with endocrine.  Continue following thyroglobulin antibody levels every year to 2 years.

## 2025-02-21 NOTE — ASSESSMENT & PLAN NOTE
Will return in 7 to 10 days for blood pressure check.  Will keep her appointment for preop clearance.

## 2025-02-21 NOTE — ASSESSMENT & PLAN NOTE
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  Has underlying mild splenomegaly, FELICIA positivity and Alexi syndrome.  Follows with Dr. Fratamico.  Platelets are adequate to proceed with surgery.

## 2025-02-21 NOTE — PROGRESS NOTES
"Name: Ashley Phillips      : 1955      MRN: [id number]  Encounter Provider: Sulaiman Hernandez DO  Encounter Date: 2025   Encounter department: [facility] PRIMARY CARE  :  Assessment & Plan  Essential hypertension  Patient notably has an ACE induced cough by history but she is just tolerated.  For now we will change her lisinopril to valsartan at 160 mg daily.  I have also added amlodipine at 2.5 mg daily and will continue her metoprolol.  Etiology of her current \"spike\" and blood pressure uncertain.  She has not been taking any new medications.  Wonder how long she has actually been controlled.  She has just started monitoring her blood pressure since this headache has kicked in.  Orders:  •  Basic metabolic panel; Future  •  valsartan (DIOVAN) 160 mg tablet; Take 1 tablet (160 mg total) by mouth daily  •  amLODIPine (NORVASC) 2.5 mg tablet; Take 1 tablet (2.5 mg total) by mouth daily    Recurrent occipital headache  Given progressive headache, associated migraine with visual disturbance, will move to CT of the head to ensure no aneurysmal disease.  She has no nuchal rigidity per se.  There is no thunderclap headache.  Orders:  •  CTA head and neck w wo contrast; Future    Post-surgical hypothyroidism  Continues on replacement periodic monitoring.       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  Continues to follow with endocrine.  Continue following thyroglobulin antibody levels every year to 2 years.       Acute idiopathic gout, unspecified site  Recently started on 100 mg of allopurinol.  Will check uric acid level with her labs prior to her CTA.  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  Has underlying mild splenomegaly, FELICIA positivity and Alexi syndrome.  Follows with Dr. Fratamico.  Platelets are adequate to proceed with surgery.       Arthritis of right " hip  Will return in 7 to 10 days for blood pressure check.  Will keep her appointment for preop clearance.              History of Present Illness   Patient is here because she notes her blood pressure is just blank.  She is not sure why.  Has not anything different.  No recent change in medications.  Longstanding she has been on lisinopril as well as metoprolol.  At home she is running 200/110.  Denies any chest pain to speak of or any shortness of breath.  Has a headache which has been going up the back of her neck into her occipital area over the last several days.  No thunderclap headache to speak of.  Describes some weakness, some visual changes as well at times.  She is 1 who gets migraines.  No recent stresses in her life that she is aware of.  Has not increased any medication, and note :  she is going to be going for total hip replacement within the month.    Had a recent history of gouty attacks for uncertain reason.  Placed on steroids for several occasions, now on allopurinol at a low dose.      Review of Systems   Constitutional:  Negative for chills and fever.   HENT:  Negative for sore throat.    Eyes:  Negative for pain and visual disturbance.   Respiratory:  Negative for shortness of breath.    Cardiovascular:  Negative for chest pain and palpitations.   Gastrointestinal:  Negative for abdominal pain and vomiting.   Genitourinary:  Negative for dysuria and hematuria.   Musculoskeletal:  Positive for arthralgias and back pain.   Skin:  Negative for color change and rash.   Neurological:  Positive for headaches. Negative for syncope.   Psychiatric/Behavioral:  The patient is nervous/anxious.    All other systems reviewed and are negative.      Objective   BP (!) 168/118 (BP Location: Left arm, Patient Position: Sitting, Cuff Size: Standard)   Pulse 68   Temp (!) 96.6 °F (35.9 °C) (Tympanic)   SpO2 98%      Physical Exam  Vitals and nursing note reviewed.   Constitutional:       General: She is not  in acute distress.     Appearance: Normal appearance. She is well-developed.   HENT:      Head: Normocephalic and atraumatic.   Eyes:      Conjunctiva/sclera: Conjunctivae normal.   Cardiovascular:      Rate and Rhythm: Normal rate and regular rhythm.      Pulses: Normal pulses.      Heart sounds: No murmur heard.     Gallop present.   Pulmonary:      Effort: Pulmonary effort is normal. No respiratory distress.      Breath sounds: Normal breath sounds.   Abdominal:      Palpations: Abdomen is soft.      Tenderness: There is no abdominal tenderness.   Musculoskeletal:         General: Tenderness present. No swelling.      Cervical back: Neck supple. No rigidity or tenderness.   Skin:     General: Skin is warm and dry.      Capillary Refill: Capillary refill takes less than 2 seconds.   Neurological:      General: No focal deficit present.      Mental Status: She is alert and oriented to person, place, and time.   Psychiatric:         Mood and Affect: Mood normal.

## 2025-02-24 ENCOUNTER — RESULTS FOLLOW-UP (OUTPATIENT)
Dept: FAMILY MEDICINE CLINIC | Facility: CLINIC | Age: 70
End: 2025-02-24

## 2025-02-24 ENCOUNTER — HOSPITAL ENCOUNTER (OUTPATIENT)
Dept: MAMMOGRAPHY | Facility: HOSPITAL | Age: 70
Discharge: HOME | End: 2025-02-24
Attending: FAMILY MEDICINE
Payer: MEDICARE

## 2025-02-24 DIAGNOSIS — Z12.31 ENCOUNTER FOR SCREENING MAMMOGRAM FOR BREAST CANCER: ICD-10-CM

## 2025-02-24 PROCEDURE — 77063 BREAST TOMOSYNTHESIS BI: CPT

## 2025-02-24 PROCEDURE — 77067 SCR MAMMO BI INCL CAD: CPT

## 2025-02-25 ENCOUNTER — HOSPITAL ENCOUNTER (OUTPATIENT)
Dept: CT IMAGING | Facility: HOSPITAL | Age: 70
Discharge: HOME | DRG: 418 | End: 2025-02-25
Payer: MEDICARE

## 2025-02-25 ENCOUNTER — TELEPHONE (OUTPATIENT)
Age: 70
End: 2025-02-25

## 2025-02-25 ENCOUNTER — APPOINTMENT (OUTPATIENT)
Dept: RADIOLOGY | Facility: HOSPITAL | Age: 70
DRG: 418 | End: 2025-02-25
Payer: MEDICARE

## 2025-02-25 ENCOUNTER — APPOINTMENT (EMERGENCY)
Dept: ULTRASOUND IMAGING | Facility: HOSPITAL | Age: 70
DRG: 418 | End: 2025-02-25
Payer: MEDICARE

## 2025-02-25 ENCOUNTER — HOSPITAL ENCOUNTER (INPATIENT)
Facility: HOSPITAL | Age: 70
LOS: 4 days | Discharge: HOME | DRG: 418 | End: 2025-03-02
Attending: EMERGENCY MEDICINE | Admitting: FAMILY MEDICINE
Payer: MEDICARE

## 2025-02-25 DIAGNOSIS — K80.50 BILIARY COLIC: Primary | ICD-10-CM

## 2025-02-25 DIAGNOSIS — K83.9 BILE LEAK: ICD-10-CM

## 2025-02-25 DIAGNOSIS — K80.20 CHOLELITHIASIS: ICD-10-CM

## 2025-02-25 DIAGNOSIS — D38.1 NEOPLASM OF UNCERTAIN BEHAVIOR OF BRONCHUS OF RIGHT UPPER LOBE: Primary | ICD-10-CM

## 2025-02-25 DIAGNOSIS — R17 ELEVATED BILIRUBIN: ICD-10-CM

## 2025-02-25 DIAGNOSIS — R91.8 LUNG MASS: ICD-10-CM

## 2025-02-25 DIAGNOSIS — R51.9 RECURRENT OCCIPITAL HEADACHE: ICD-10-CM

## 2025-02-25 LAB
2HR DELTA HS TROPONIN: 0 NG/L
ALBUMIN SERPL BCG-MCNC: 5.1 G/DL (ref 3.5–5)
ALP SERPL-CCNC: 70 U/L (ref 34–104)
ALT SERPL W P-5'-P-CCNC: 17 U/L (ref 7–52)
ANION GAP SERPL CALCULATED.3IONS-SCNC: 10 MMOL/L (ref 4–13)
AST SERPL W P-5'-P-CCNC: 31 U/L (ref 13–39)
BASOPHILS # BLD AUTO: 0.04 THOUSANDS/ÂΜL (ref 0–0.1)
BASOPHILS NFR BLD AUTO: 1 % (ref 0–1)
BILIRUB DIRECT SERPL-MCNC: 0.17 MG/DL (ref 0–0.2)
BILIRUB SERPL-MCNC: 0.92 MG/DL (ref 0.2–1)
BUN SERPL-MCNC: 17 MG/DL (ref 5–25)
CALCIUM SERPL-MCNC: 10 MG/DL (ref 8.4–10.2)
CARDIAC TROPONIN I PNL SERPL HS: 3 NG/L (ref ?–50)
CARDIAC TROPONIN I PNL SERPL HS: 3 NG/L (ref ?–50)
CHLORIDE SERPL-SCNC: 100 MMOL/L (ref 96–108)
CO2 SERPL-SCNC: 28 MMOL/L (ref 21–32)
CREAT SERPL-MCNC: 0.92 MG/DL (ref 0.6–1.3)
EOSINOPHIL # BLD AUTO: 0.22 THOUSAND/ÂΜL (ref 0–0.61)
EOSINOPHIL NFR BLD AUTO: 4 % (ref 0–6)
ERYTHROCYTE [DISTWIDTH] IN BLOOD BY AUTOMATED COUNT: 13.5 % (ref 11.6–15.1)
GFR SERPL CREATININE-BSD FRML MDRD: 63 ML/MIN/1.73SQ M
GLUCOSE SERPL-MCNC: 173 MG/DL (ref 65–140)
HCT VFR BLD AUTO: 39.8 % (ref 34.8–46.1)
HGB BLD-MCNC: 13.2 G/DL (ref 11.5–15.4)
IMM GRANULOCYTES # BLD AUTO: 0.01 THOUSAND/UL (ref 0–0.2)
IMM GRANULOCYTES NFR BLD AUTO: 0 % (ref 0–2)
LIPASE SERPL-CCNC: 78 U/L (ref 11–82)
LYMPHOCYTES # BLD AUTO: 1.7 THOUSANDS/ÂΜL (ref 0.6–4.47)
LYMPHOCYTES NFR BLD AUTO: 29 % (ref 14–44)
MCH RBC QN AUTO: 31.3 PG (ref 26.8–34.3)
MCHC RBC AUTO-ENTMCNC: 33.2 G/DL (ref 31.4–37.4)
MCV RBC AUTO: 94 FL (ref 82–98)
MONOCYTES # BLD AUTO: 0.54 THOUSAND/ÂΜL (ref 0.17–1.22)
MONOCYTES NFR BLD AUTO: 9 % (ref 4–12)
NEUTROPHILS # BLD AUTO: 3.39 THOUSANDS/ÂΜL (ref 1.85–7.62)
NEUTS SEG NFR BLD AUTO: 57 % (ref 43–75)
NRBC BLD AUTO-RTO: 0 /100 WBCS
PLATELET # BLD AUTO: 108 THOUSANDS/UL (ref 149–390)
PMV BLD AUTO: 10.3 FL (ref 8.9–12.7)
POTASSIUM SERPL-SCNC: 3.5 MMOL/L (ref 3.5–5.3)
PROT SERPL-MCNC: 7.2 G/DL (ref 6.4–8.4)
RBC # BLD AUTO: 4.22 MILLION/UL (ref 3.81–5.12)
SODIUM SERPL-SCNC: 138 MMOL/L (ref 135–147)
WBC # BLD AUTO: 5.9 THOUSAND/UL (ref 4.31–10.16)

## 2025-02-25 PROCEDURE — 93005 ELECTROCARDIOGRAM TRACING: CPT

## 2025-02-25 PROCEDURE — 80048 BASIC METABOLIC PNL TOTAL CA: CPT | Performed by: EMERGENCY MEDICINE

## 2025-02-25 PROCEDURE — 70496 CT ANGIOGRAPHY HEAD: CPT

## 2025-02-25 PROCEDURE — 99285 EMERGENCY DEPT VISIT HI MDM: CPT | Performed by: EMERGENCY MEDICINE

## 2025-02-25 PROCEDURE — 96375 TX/PRO/DX INJ NEW DRUG ADDON: CPT

## 2025-02-25 PROCEDURE — 70498 CT ANGIOGRAPHY NECK: CPT

## 2025-02-25 PROCEDURE — 80076 HEPATIC FUNCTION PANEL: CPT | Performed by: EMERGENCY MEDICINE

## 2025-02-25 PROCEDURE — 99285 EMERGENCY DEPT VISIT HI MDM: CPT

## 2025-02-25 PROCEDURE — 84484 ASSAY OF TROPONIN QUANT: CPT | Performed by: EMERGENCY MEDICINE

## 2025-02-25 PROCEDURE — 96374 THER/PROPH/DIAG INJ IV PUSH: CPT

## 2025-02-25 PROCEDURE — 71045 X-RAY EXAM CHEST 1 VIEW: CPT

## 2025-02-25 PROCEDURE — 83690 ASSAY OF LIPASE: CPT | Performed by: EMERGENCY MEDICINE

## 2025-02-25 PROCEDURE — 85025 COMPLETE CBC W/AUTO DIFF WBC: CPT | Performed by: EMERGENCY MEDICINE

## 2025-02-25 PROCEDURE — 96376 TX/PRO/DX INJ SAME DRUG ADON: CPT

## 2025-02-25 PROCEDURE — 36415 COLL VENOUS BLD VENIPUNCTURE: CPT | Performed by: EMERGENCY MEDICINE

## 2025-02-25 PROCEDURE — 76705 ECHO EXAM OF ABDOMEN: CPT

## 2025-02-25 RX ORDER — HYDROMORPHONE HCL IN WATER/PF 6 MG/30 ML
0.2 PATIENT CONTROLLED ANALGESIA SYRINGE INTRAVENOUS ONCE
Refills: 0 | Status: COMPLETED | OUTPATIENT
Start: 2025-02-25 | End: 2025-02-25

## 2025-02-25 RX ORDER — HYDRALAZINE HYDROCHLORIDE 20 MG/ML
5 INJECTION INTRAMUSCULAR; INTRAVENOUS EVERY 6 HOURS PRN
Status: DISCONTINUED | OUTPATIENT
Start: 2025-02-25 | End: 2025-03-02 | Stop reason: HOSPADM

## 2025-02-25 RX ORDER — HYDROMORPHONE HCL IN WATER/PF 6 MG/30 ML
0.2 PATIENT CONTROLLED ANALGESIA SYRINGE INTRAVENOUS
Status: DISCONTINUED | OUTPATIENT
Start: 2025-02-25 | End: 2025-02-25

## 2025-02-25 RX ORDER — HYDROMORPHONE HCL/PF 1 MG/ML
0.5 SYRINGE (ML) INJECTION
Status: DISCONTINUED | OUTPATIENT
Start: 2025-02-25 | End: 2025-02-26

## 2025-02-25 RX ORDER — HYDRALAZINE HYDROCHLORIDE 20 MG/ML
5 INJECTION INTRAMUSCULAR; INTRAVENOUS ONCE
Status: COMPLETED | OUTPATIENT
Start: 2025-02-26 | End: 2025-02-26

## 2025-02-25 RX ORDER — HEPARIN SODIUM 5000 [USP'U]/ML
5000 INJECTION, SOLUTION INTRAVENOUS; SUBCUTANEOUS EVERY 8 HOURS SCHEDULED
Status: DISCONTINUED | OUTPATIENT
Start: 2025-02-26 | End: 2025-02-25

## 2025-02-25 RX ORDER — HYDROMORPHONE HCL/PF 1 MG/ML
0.5 SYRINGE (ML) INJECTION ONCE
Status: COMPLETED | OUTPATIENT
Start: 2025-02-25 | End: 2025-02-26

## 2025-02-25 RX ORDER — ONDANSETRON 2 MG/ML
4 INJECTION INTRAMUSCULAR; INTRAVENOUS ONCE
Status: COMPLETED | OUTPATIENT
Start: 2025-02-25 | End: 2025-02-25

## 2025-02-25 RX ADMIN — ONDANSETRON 4 MG: 2 INJECTION INTRAMUSCULAR; INTRAVENOUS at 20:29

## 2025-02-25 RX ADMIN — HYDROMORPHONE HYDROCHLORIDE 0.2 MG: 0.2 INJECTION, SOLUTION INTRAMUSCULAR; INTRAVENOUS; SUBCUTANEOUS at 20:29

## 2025-02-25 RX ADMIN — HYDROMORPHONE HYDROCHLORIDE 0.2 MG: 0.2 INJECTION, SOLUTION INTRAMUSCULAR; INTRAVENOUS; SUBCUTANEOUS at 22:03

## 2025-02-25 RX ADMIN — IOHEXOL 85 ML: 350 INJECTION, SOLUTION INTRAVENOUS at 08:28

## 2025-02-25 NOTE — TELEPHONE ENCOUNTER
Arturo, Reading Room, reports immediate finding for CTA Head and Neck. Results are available in [facility].      Per radiologist report: I personally epic secure messaged this study with ROMAN MEIER on 2/25/2025 4:26 PM, who read and responded immediately. The study was marked in EPIC for immediate notification.

## 2025-02-26 ENCOUNTER — ANESTHESIA (OUTPATIENT)
Dept: PERIOP | Facility: HOSPITAL | Age: 70
DRG: 418 | End: 2025-02-26
Payer: MEDICARE

## 2025-02-26 ENCOUNTER — TELEPHONE (OUTPATIENT)
Age: 70
End: 2025-02-26

## 2025-02-26 ENCOUNTER — ANESTHESIA EVENT (OUTPATIENT)
Dept: PERIOP | Facility: HOSPITAL | Age: 70
DRG: 418 | End: 2025-02-26
Payer: MEDICARE

## 2025-02-26 ENCOUNTER — APPOINTMENT (OUTPATIENT)
Dept: CT IMAGING | Facility: HOSPITAL | Age: 70
DRG: 418 | End: 2025-02-26
Payer: MEDICARE

## 2025-02-26 DIAGNOSIS — D38.1 NEOPLASM OF UNCERTAIN BEHAVIOR OF RIGHT UPPER LOBE OF LUNG: ICD-10-CM

## 2025-02-26 DIAGNOSIS — R91.8 LUNG MASS: Primary | ICD-10-CM

## 2025-02-26 PROBLEM — K80.50 BILIARY COLIC: Status: ACTIVE | Noted: 2025-02-26

## 2025-02-26 PROBLEM — I16.9 HYPERTENSIVE CRISIS: Status: ACTIVE | Noted: 2025-02-26

## 2025-02-26 LAB
ATRIAL RATE: 60 BPM
P AXIS: 34 DEGREES
PR INTERVAL: 164 MS
QRS AXIS: 12 DEGREES
QRSD INTERVAL: 80 MS
QT INTERVAL: 426 MS
QTC INTERVAL: 426 MS
T WAVE AXIS: 30 DEGREES
VENTRICULAR RATE: 60 BPM

## 2025-02-26 PROCEDURE — 0WQF4ZZ REPAIR ABDOMINAL WALL, PERCUTANEOUS ENDOSCOPIC APPROACH: ICD-10-PCS | Performed by: SURGERY

## 2025-02-26 PROCEDURE — 99223 1ST HOSP IP/OBS HIGH 75: CPT | Performed by: SURGERY

## 2025-02-26 PROCEDURE — 47562 LAPAROSCOPIC CHOLECYSTECTOMY: CPT | Performed by: SURGERY

## 2025-02-26 PROCEDURE — 71250 CT THORAX DX C-: CPT

## 2025-02-26 PROCEDURE — 0FT44ZZ RESECTION OF GALLBLADDER, PERCUTANEOUS ENDOSCOPIC APPROACH: ICD-10-PCS | Performed by: SURGERY

## 2025-02-26 PROCEDURE — 99223 1ST HOSP IP/OBS HIGH 75: CPT | Performed by: FAMILY MEDICINE

## 2025-02-26 PROCEDURE — 93010 ELECTROCARDIOGRAM REPORT: CPT | Performed by: INTERNAL MEDICINE

## 2025-02-26 PROCEDURE — 99223 1ST HOSP IP/OBS HIGH 75: CPT | Performed by: INTERNAL MEDICINE

## 2025-02-26 PROCEDURE — 88304 TISSUE EXAM BY PATHOLOGIST: CPT | Performed by: PATHOLOGY

## 2025-02-26 RX ORDER — ACETAMINOPHEN 325 MG/1
650 TABLET ORAL EVERY 6 HOURS PRN
Status: DISCONTINUED | OUTPATIENT
Start: 2025-02-26 | End: 2025-03-02 | Stop reason: HOSPADM

## 2025-02-26 RX ORDER — LIDOCAINE HYDROCHLORIDE 10 MG/ML
INJECTION, SOLUTION EPIDURAL; INFILTRATION; INTRACAUDAL; PERINEURAL AS NEEDED
Status: DISCONTINUED | OUTPATIENT
Start: 2025-02-26 | End: 2025-02-26

## 2025-02-26 RX ORDER — ONDANSETRON 2 MG/ML
4 INJECTION INTRAMUSCULAR; INTRAVENOUS ONCE AS NEEDED
Status: DISCONTINUED | OUTPATIENT
Start: 2025-02-26 | End: 2025-02-26 | Stop reason: HOSPADM

## 2025-02-26 RX ORDER — FENTANYL CITRATE 50 UG/ML
INJECTION, SOLUTION INTRAMUSCULAR; INTRAVENOUS AS NEEDED
Status: DISCONTINUED | OUTPATIENT
Start: 2025-02-26 | End: 2025-02-26

## 2025-02-26 RX ORDER — ONDANSETRON 2 MG/ML
4 INJECTION INTRAMUSCULAR; INTRAVENOUS EVERY 8 HOURS PRN
Status: DISCONTINUED | OUTPATIENT
Start: 2025-02-26 | End: 2025-03-02 | Stop reason: HOSPADM

## 2025-02-26 RX ORDER — HYDROMORPHONE HCL/PF 1 MG/ML
SYRINGE (ML) INJECTION AS NEEDED
Status: DISCONTINUED | OUTPATIENT
Start: 2025-02-26 | End: 2025-02-26

## 2025-02-26 RX ORDER — MIDAZOLAM HYDROCHLORIDE 2 MG/2ML
INJECTION, SOLUTION INTRAMUSCULAR; INTRAVENOUS AS NEEDED
Status: DISCONTINUED | OUTPATIENT
Start: 2025-02-26 | End: 2025-02-26

## 2025-02-26 RX ORDER — BUPIVACAINE HYDROCHLORIDE 5 MG/ML
INJECTION, SOLUTION EPIDURAL; INTRACAUDAL AS NEEDED
Status: DISCONTINUED | OUTPATIENT
Start: 2025-02-26 | End: 2025-02-26 | Stop reason: HOSPADM

## 2025-02-26 RX ORDER — HYDROMORPHONE HCL IN WATER/PF 6 MG/30 ML
0.2 PATIENT CONTROLLED ANALGESIA SYRINGE INTRAVENOUS EVERY 4 HOURS PRN
Status: DISCONTINUED | OUTPATIENT
Start: 2025-02-26 | End: 2025-02-26

## 2025-02-26 RX ORDER — SODIUM CHLORIDE, SODIUM GLUCONATE, SODIUM ACETATE, POTASSIUM CHLORIDE, MAGNESIUM CHLORIDE, SODIUM PHOSPHATE, DIBASIC, AND POTASSIUM PHOSPHATE .53; .5; .37; .037; .03; .012; .00082 G/100ML; G/100ML; G/100ML; G/100ML; G/100ML; G/100ML; G/100ML
100 INJECTION, SOLUTION INTRAVENOUS CONTINUOUS
Status: DISCONTINUED | OUTPATIENT
Start: 2025-02-26 | End: 2025-03-01

## 2025-02-26 RX ORDER — AMLODIPINE BESYLATE 2.5 MG/1
2.5 TABLET ORAL DAILY
Status: CANCELLED | OUTPATIENT
Start: 2025-02-26

## 2025-02-26 RX ORDER — LOSARTAN POTASSIUM 50 MG/1
100 TABLET ORAL DAILY
Status: CANCELLED | OUTPATIENT
Start: 2025-02-26

## 2025-02-26 RX ORDER — HYDROMORPHONE HCL/PF 1 MG/ML
0.5 SYRINGE (ML) INJECTION
Status: DISCONTINUED | OUTPATIENT
Start: 2025-02-26 | End: 2025-02-26 | Stop reason: HOSPADM

## 2025-02-26 RX ORDER — PANTOPRAZOLE SODIUM 40 MG/10ML
40 INJECTION, POWDER, LYOPHILIZED, FOR SOLUTION INTRAVENOUS
Status: DISCONTINUED | OUTPATIENT
Start: 2025-02-26 | End: 2025-03-02 | Stop reason: HOSPADM

## 2025-02-26 RX ORDER — LEVOTHYROXINE SODIUM 100 UG/1
100 TABLET ORAL
Status: CANCELLED | OUTPATIENT
Start: 2025-02-26

## 2025-02-26 RX ORDER — SODIUM CHLORIDE, SODIUM LACTATE, POTASSIUM CHLORIDE, CALCIUM CHLORIDE 600; 310; 30; 20 MG/100ML; MG/100ML; MG/100ML; MG/100ML
INJECTION, SOLUTION INTRAVENOUS CONTINUOUS PRN
Status: DISCONTINUED | OUTPATIENT
Start: 2025-02-26 | End: 2025-02-26

## 2025-02-26 RX ORDER — ONDANSETRON 2 MG/ML
INJECTION INTRAMUSCULAR; INTRAVENOUS AS NEEDED
Status: DISCONTINUED | OUTPATIENT
Start: 2025-02-26 | End: 2025-02-26

## 2025-02-26 RX ORDER — HYDROMORPHONE HCL/PF 1 MG/ML
0.5 SYRINGE (ML) INJECTION EVERY 4 HOURS PRN
Status: DISCONTINUED | OUTPATIENT
Start: 2025-02-26 | End: 2025-02-26

## 2025-02-26 RX ORDER — DEXAMETHASONE SODIUM PHOSPHATE 10 MG/ML
INJECTION, SOLUTION INTRAMUSCULAR; INTRAVENOUS AS NEEDED
Status: DISCONTINUED | OUTPATIENT
Start: 2025-02-26 | End: 2025-02-26

## 2025-02-26 RX ORDER — CEFAZOLIN SODIUM 2 G/50ML
2000 SOLUTION INTRAVENOUS
Status: COMPLETED | OUTPATIENT
Start: 2025-02-26 | End: 2025-02-26

## 2025-02-26 RX ORDER — FENTANYL CITRATE/PF 50 MCG/ML
50 SYRINGE (ML) INJECTION
Status: DISCONTINUED | OUTPATIENT
Start: 2025-02-26 | End: 2025-02-26 | Stop reason: HOSPADM

## 2025-02-26 RX ORDER — PROMETHAZINE HYDROCHLORIDE 25 MG/ML
12.5 INJECTION, SOLUTION INTRAMUSCULAR; INTRAVENOUS ONCE AS NEEDED
Status: DISCONTINUED | OUTPATIENT
Start: 2025-02-26 | End: 2025-02-26 | Stop reason: HOSPADM

## 2025-02-26 RX ORDER — OXYCODONE AND ACETAMINOPHEN 5; 325 MG/1; MG/1
1 TABLET ORAL EVERY 4 HOURS PRN
Qty: 20 TABLET | Refills: 0 | Status: SHIPPED | OUTPATIENT
Start: 2025-02-26

## 2025-02-26 RX ORDER — LEVOTHYROXINE SODIUM 100 UG/1
100 TABLET ORAL
Status: DISCONTINUED | OUTPATIENT
Start: 2025-02-26 | End: 2025-03-02 | Stop reason: HOSPADM

## 2025-02-26 RX ORDER — HEPARIN SODIUM 5000 [USP'U]/ML
5000 INJECTION, SOLUTION INTRAVENOUS; SUBCUTANEOUS EVERY 8 HOURS SCHEDULED
Status: DISCONTINUED | OUTPATIENT
Start: 2025-02-26 | End: 2025-02-28

## 2025-02-26 RX ORDER — OXYCODONE HYDROCHLORIDE 10 MG/1
10 TABLET ORAL EVERY 6 HOURS PRN
Refills: 0 | Status: DISCONTINUED | OUTPATIENT
Start: 2025-02-26 | End: 2025-03-02 | Stop reason: HOSPADM

## 2025-02-26 RX ORDER — ROCURONIUM BROMIDE 10 MG/ML
INJECTION, SOLUTION INTRAVENOUS AS NEEDED
Status: DISCONTINUED | OUTPATIENT
Start: 2025-02-26 | End: 2025-02-26

## 2025-02-26 RX ORDER — ONDANSETRON 2 MG/ML
4 INJECTION INTRAMUSCULAR; INTRAVENOUS ONCE
Status: COMPLETED | OUTPATIENT
Start: 2025-02-26 | End: 2025-02-26

## 2025-02-26 RX ORDER — ATENOLOL 50 MG/1
50 TABLET ORAL 2 TIMES DAILY
Status: CANCELLED | OUTPATIENT
Start: 2025-02-26

## 2025-02-26 RX ORDER — ALLOPURINOL 100 MG/1
100 TABLET ORAL DAILY
Status: CANCELLED | OUTPATIENT
Start: 2025-02-26

## 2025-02-26 RX ORDER — EPHEDRINE SULFATE 50 MG/ML
INJECTION INTRAVENOUS AS NEEDED
Status: DISCONTINUED | OUTPATIENT
Start: 2025-02-26 | End: 2025-02-26

## 2025-02-26 RX ORDER — PROPOFOL 10 MG/ML
INJECTION, EMULSION INTRAVENOUS AS NEEDED
Status: DISCONTINUED | OUTPATIENT
Start: 2025-02-26 | End: 2025-02-26

## 2025-02-26 RX ORDER — HYDROMORPHONE HCL/PF 1 MG/ML
0.5 SYRINGE (ML) INJECTION
Status: DISCONTINUED | OUTPATIENT
Start: 2025-02-26 | End: 2025-03-02 | Stop reason: HOSPADM

## 2025-02-26 RX ADMIN — HYDROMORPHONE HYDROCHLORIDE 0.5 MG: 1 INJECTION, SOLUTION INTRAMUSCULAR; INTRAVENOUS; SUBCUTANEOUS at 22:01

## 2025-02-26 RX ADMIN — EPHEDRINE SULFATE 10 MG: 50 INJECTION, SOLUTION INTRAVENOUS at 17:51

## 2025-02-26 RX ADMIN — LIDOCAINE HYDROCHLORIDE 50 MG: 10 INJECTION, SOLUTION EPIDURAL; INFILTRATION; INTRACAUDAL; PERINEURAL at 15:55

## 2025-02-26 RX ADMIN — HYDROMORPHONE HYDROCHLORIDE 0.5 MG: 1 INJECTION, SOLUTION INTRAMUSCULAR; INTRAVENOUS; SUBCUTANEOUS at 00:52

## 2025-02-26 RX ADMIN — ROCURONIUM BROMIDE 50 MG: 10 INJECTION, SOLUTION INTRAVENOUS at 15:57

## 2025-02-26 RX ADMIN — HYDRALAZINE HYDROCHLORIDE 5 MG: 20 INJECTION INTRAMUSCULAR; INTRAVENOUS at 00:50

## 2025-02-26 RX ADMIN — CEFAZOLIN SODIUM 2000 MG: 2 SOLUTION INTRAVENOUS at 16:03

## 2025-02-26 RX ADMIN — SUGAMMADEX 200 MG: 100 INJECTION, SOLUTION INTRAVENOUS at 18:01

## 2025-02-26 RX ADMIN — ONDANSETRON 4 MG: 2 INJECTION INTRAMUSCULAR; INTRAVENOUS at 01:16

## 2025-02-26 RX ADMIN — HYDROMORPHONE HYDROCHLORIDE 0.5 MG: 1 INJECTION, SOLUTION INTRAMUSCULAR; INTRAVENOUS; SUBCUTANEOUS at 17:33

## 2025-02-26 RX ADMIN — FENTANYL CITRATE 25 MCG: 50 INJECTION INTRAMUSCULAR; INTRAVENOUS at 17:34

## 2025-02-26 RX ADMIN — SODIUM CHLORIDE, SODIUM LACTATE, POTASSIUM CHLORIDE, AND CALCIUM CHLORIDE: .6; .31; .03; .02 INJECTION, SOLUTION INTRAVENOUS at 15:53

## 2025-02-26 RX ADMIN — LEVOTHYROXINE SODIUM 100 MCG: 100 TABLET ORAL at 22:00

## 2025-02-26 RX ADMIN — FENTANYL CITRATE 25 MCG: 50 INJECTION INTRAMUSCULAR; INTRAVENOUS at 16:59

## 2025-02-26 RX ADMIN — FENTANYL CITRATE 50 MCG: 50 INJECTION INTRAMUSCULAR; INTRAVENOUS at 15:55

## 2025-02-26 RX ADMIN — ROCURONIUM BROMIDE 10 MG: 10 INJECTION, SOLUTION INTRAVENOUS at 17:31

## 2025-02-26 RX ADMIN — SODIUM CHLORIDE, SODIUM GLUCONATE, SODIUM ACETATE, POTASSIUM CHLORIDE, MAGNESIUM CHLORIDE, SODIUM PHOSPHATE, DIBASIC, AND POTASSIUM PHOSPHATE 100 ML/HR: .53; .5; .37; .037; .03; .012; .00082 INJECTION, SOLUTION INTRAVENOUS at 22:04

## 2025-02-26 RX ADMIN — OXYCODONE HYDROCHLORIDE 10 MG: 10 TABLET ORAL at 19:36

## 2025-02-26 RX ADMIN — SODIUM CHLORIDE, SODIUM LACTATE, POTASSIUM CHLORIDE, AND CALCIUM CHLORIDE: .6; .31; .03; .02 INJECTION, SOLUTION INTRAVENOUS at 17:52

## 2025-02-26 RX ADMIN — PANTOPRAZOLE SODIUM 40 MG: 40 INJECTION, POWDER, FOR SOLUTION INTRAVENOUS at 09:21

## 2025-02-26 RX ADMIN — MIDAZOLAM 2 MG: 1 INJECTION INTRAMUSCULAR; INTRAVENOUS at 15:53

## 2025-02-26 RX ADMIN — HEPARIN SODIUM 5000 UNITS: 5000 INJECTION, SOLUTION INTRAVENOUS; SUBCUTANEOUS at 22:00

## 2025-02-26 RX ADMIN — ONDANSETRON 4 MG: 2 INJECTION INTRAMUSCULAR; INTRAVENOUS at 17:41

## 2025-02-26 RX ADMIN — SODIUM CHLORIDE, SODIUM GLUCONATE, SODIUM ACETATE, POTASSIUM CHLORIDE, MAGNESIUM CHLORIDE, SODIUM PHOSPHATE, DIBASIC, AND POTASSIUM PHOSPHATE 100 ML/HR: .53; .5; .37; .037; .03; .012; .00082 INJECTION, SOLUTION INTRAVENOUS at 01:16

## 2025-02-26 RX ADMIN — PHENYLEPHRINE HYDROCHLORIDE 100 MCG: 10 INJECTION INTRAVENOUS at 16:08

## 2025-02-26 RX ADMIN — CEFAZOLIN SODIUM 2000 MG: 2 SOLUTION INTRAVENOUS at 10:28

## 2025-02-26 RX ADMIN — ACETAMINOPHEN 650 MG: 325 TABLET, FILM COATED ORAL at 14:14

## 2025-02-26 RX ADMIN — PROPOFOL 100 MG: 10 INJECTION, EMULSION INTRAVENOUS at 15:57

## 2025-02-26 RX ADMIN — ROCURONIUM BROMIDE 10 MG: 10 INJECTION, SOLUTION INTRAVENOUS at 16:59

## 2025-02-26 RX ADMIN — DEXAMETHASONE SODIUM PHOSPHATE 10 MG: 10 INJECTION, SOLUTION INTRAMUSCULAR; INTRAVENOUS at 15:57

## 2025-02-26 RX ADMIN — DEXMEDETOMIDINE 12 MCG: 200 INJECTION, SOLUTION INTRAVENOUS at 15:55

## 2025-02-26 NOTE — ANESTHESIA PREPROCEDURE EVALUATION
Procedure:  CHOLECYSTECTOMY LAPAROSCOPIC (Abdomen)    Relevant Problems   ANESTHESIA (within normal limits)      CARDIO   (+) Essential hypertension   (+) Hypertensive crisis   (-) Angina at rest (HCC)   (-) Angina of effort (HCC)   (-) Chest pain   (-) PULLIAM (dyspnea on exertion)      ENDO   (+) Hypothyroidism      GI/HEPATIC   (-) Chronic liver disease      /RENAL   (-) Chronic kidney disease      HEMATOLOGY   (+) Autoimmune thrombocytopenia (HCC)   (+) Thrombocytopenia (HCC)      MUSCULOSKELETAL   (+) Chronic right-sided low back pain with right-sided sciatica   (+) Idiopathic gout involving toe of left foot   (+) Primary osteoarthritis of right hip      NEURO/PSYCH   (+) Chronic right-sided low back pain with right-sided sciatica   (+) Recurrent occipital headache   (-) CVA (cerebral vascular accident) (HCC)   (-) Seizures (HCC)      PULMONARY   (-) Asthma   (-) Chronic obstructive pulmonary disease (HCC)   (-) Sleep apnea      Other   (+) Splenomegaly        Physical Exam    Airway    Mallampati score: I  TM Distance: <3 FB  Neck ROM: full     Dental    upper dentures    Cardiovascular      Pulmonary      Other Findings  post-pubertal.      Anesthesia Plan  ASA Score- 3     Anesthesia Type- general with ASA Monitors.         Additional Monitors:     Airway Plan: ETT.           Plan Factors-Exercise tolerance (METS): >4 METS.    Chart reviewed.   Existing labs reviewed. Patient summary reviewed.                  Induction- intravenous.    Postoperative Plan- Plan for postoperative opioid use. Planned trial extubation    Perioperative Resuscitation Plan - Level 1 - Full Code.       Informed Consent- Anesthetic plan and risks discussed with patient.  I personally reviewed this patient with the CRNA. Discussed and agreed on the Anesthesia Plan with the CRNA..      NPO Status:  No vitals data found for the desired time range.

## 2025-02-26 NOTE — ASSESSMENT & PLAN NOTE
I reviewed Ashley's CAT scan with her and her .  She does have a lung mass which is likely bronchogenic lung cancer.  If she was an outpatient I would schedule a PET scan PFTs and a lung biopsy.  I like to proceed with CT-guided lung biopsy as soon as possible PFTs and PET scan and likely presentation at tumor board.  I explained to her that she would likely be early stage and possible surgical resection

## 2025-02-26 NOTE — ASSESSMENT & PLAN NOTE
Patient followed by Dr. Roberto Fratamico hematologist from [facility].  Platelet count here 108.

## 2025-02-26 NOTE — ASSESSMENT & PLAN NOTE
Patient with degenerative joint disease right hip, scheduled for right total hip arthroplasty on April 14 in [place] by Dr. Anurag Talamantes.

## 2025-02-26 NOTE — H&P
H&P - Hospitalist   Name: Ashley Phillips 69 y.o. female I MRN: [id number]  Unit/Bed#: RM07 I Date of Admission: 2/25/2025   Date of Service: 2/26/2025 I Hospital Day: 0     Assessment & Plan  Biliary colic  Presented from home after eating a cheeseburger and fries for evening meal with right sided upper quadrant abdominal pain radiating to back with nausea  Abdominal ultrasound with cholelithiasis. No gallbladder wall thickening or pericholecystic fluid   no fever or leukocytosis  We will keep n.p.o., IV hydration  Appreciate general surgery consultation  Pain control  Hypertensive crisis  Recently transition from ACE inhibitor to ARB due to cough  Continue PTA regimen amlodipine 2.5 mg daily, atenolol 50 mg twice daily, ARB  Adjust regimen as needed  /100 on ED arrival  Improved with IV hydralazine, will continue PRN  Trend blood pressures  Lung mass  Had outpatient CTA head and neck, noted 2.5 cm right lung mass  Chest x-ray obtained in ED with right peripheral lung mass  Will obtain CT chest without contrast to further evaluate lung mass in association with severe back pain    Hypothyroidism  Continue levothyroxine   Thrombocytopenia (HCC)  Chronic thrombocytopenia   Evaluated by heme-onc in the outpatient setting and felt this is most likely ITP   Platelet count 108 on admission  SCDs for DVT prophylaxis      VTE Pharmacologic Prophylaxis:   High Risk (Score >/= 5) - Pharmacological DVT Prophylaxis Contraindicated. Sequential Compression Devices Ordered.  Code Status: Level 1 - Full Code   Discussion with family: Patient declined call to .     Anticipated Length of Stay: Patient will be admitted on an observation basis with an anticipated length of stay of less than 2 midnights secondary to abdominal pain.    History of Present Illness   Chief Complaint: Abdominal pain    Ashley Phillips is a 69 y.o. female with a PMH of chronic ITP, hypothyroidism Hashimoto's thyroiditis, hypertension,  gout, recurrent headaches, who presents with acute onset abdominal and back pain.  Patient had a hamburger and French fries for evening meal then developed abdominal pain radiating to back associated nausea.  Abdominal ultrasound revealing cholelithiasis, no gallbladder wall thickening, no fever or leukocytosis.  Incidentally patient was seen by her PCP earlier this week for uncontrolled blood pressure and headache, outpatient CTA head and neck done earlier in the day worrisome for right sided lung mass.  Chest x-ray obtained in ED again showing right sided peripheral lung mass, will need further evaluation as this may be contributing to severe back pain.    Review of Systems   Constitutional:  Negative for chills and fever.   HENT:  Negative for ear pain and sore throat.    Eyes:  Negative for pain and visual disturbance.   Respiratory:  Negative for cough and shortness of breath.    Cardiovascular:  Negative for chest pain and palpitations.   Gastrointestinal:  Positive for abdominal pain and nausea. Negative for vomiting.   Genitourinary:  Negative for dysuria and hematuria.   Musculoskeletal:  Positive for arthralgias and back pain.   Skin:  Negative for color change and rash.   Neurological:  Negative for seizures and syncope.   All other systems reviewed and are negative.      Historical Information   Past Medical History:   Diagnosis Date    Chronic ITP (idiopathic thrombocytopenia) (HCC)     Disease of thyroid gland     Hypertension      Past Surgical History:   Procedure Laterality Date    COLONOSCOPY  10/26/2017    Dr. Guevara - polyp in sigmoid colon removed; moderate diverticulosis in sigmoid colon. Bx: tubular adenoma.    COLONOSCOPY  08/03/2012    Dr. Guevara - polyp found in sigmoid colon removed. Moderately severe diverticulosis in the sigmoid colon. Bx: tubular adenoma.    HYSTERECTOMY      THYROIDECTOMY, PARTIAL Left 2004    papillary carcinoma of the thyroid    TOTAL THYROIDECTOMY  Bilateral 2009    [facility]     Social History     Tobacco Use    Smoking status: Former     Current packs/day: 0.00     Types: Cigarettes     Quit date:      Years since quittin.1    Smokeless tobacco: Never   Vaping Use    Vaping status: Never Used   Substance and Sexual Activity    Alcohol use: Never    Drug use: Never    Sexual activity: Not Currently     E-Cigarette/Vaping    E-Cigarette Use Never User      E-Cigarette/Vaping Substances    Nicotine No     THC No     CBD No     Flavoring No     Other No     Unknown No      Family History   Problem Relation Age of Onset    Leukemia Mother         chronic lymphocytic leukemia    Hashimoto's thyroiditis Mother     Diabetes type II Mother     Lung cancer Father     No Known Problems Daughter     No Known Problems Maternal Grandmother     No Known Problems Maternal Grandfather     No Known Problems Paternal Grandmother     No Known Problems Paternal Grandfather     No Known Problems Brother     No Known Problems Son     No Known Problems Maternal Aunt     No Known Problems Paternal Aunt     Breast cancer Cousin      Social History:  Marital Status: /Civil Union   Occupation: retired  Patient Pre-hospital Living Situation: Home  Patient Pre-hospital Level of Mobility: walks  Patient Pre-hospital Diet Restrictions: none    Meds/Allergies   I have reviewed home medications with patient personally.  Prior to Admission medications    Medication Sig Start Date End Date Taking? Authorizing Provider   albuterol (Ventolin HFA) 90 mcg/act inhaler Inhale 2 puffs every 6 (six) hours as needed for wheezing 24   Sera Perry MD   allopurinol (ZYLOPRIM) 100 mg tablet TAKE 1 TABLET BY MOUTH EVERY DAY 25   Sera Perry MD   amLODIPine (NORVASC) 2.5 mg tablet Take 1 tablet (2.5 mg total) by mouth daily 25   Sulaiman Hernandez DO   atenolol (TENORMIN) 50 mg tablet TAKE 1 TABLET BY MOUTH TWICE A DAY 24   Sera Perry MD   Biotin 1000  "MCG CHEW Chew daily    Historical Provider, MD   Cholecalciferol (Vitamin D3) 50 MCG (2000 UT) TABS Take 2,000 Units by mouth daily    Historical Provider, MD   cyanocobalamin (VITAMIN B-12) 1000 MCG tablet Take 1,000 mcg by mouth daily    Historical Provider, MD   levothyroxine 100 mcg tablet take 1 tablet by mouth 6 days  PER WEEK, 1/2 TABELT ONE DAY A WEEK 6/21/22   Historical Provider, MD   valsartan (DIOVAN) 160 mg tablet Take 1 tablet (160 mg total) by mouth daily 2/21/25   Sulaiman Hernandez, DO     Allergies   Allergen Reactions    Ciprofloxacin Lip Swelling    Erythromycin Other (See Comments)     \"jaundice\"    Penicillins Swelling       Objective :  Temp:  [96.8 °F (36 °C)-98.5 °F (36.9 °C)] 98.5 °F (36.9 °C)  HR:  [48-62] 59  BP: (156-206)/() 160/74  Resp:  [16-22] 21  SpO2:  [93 %-99 %] 93 %  O2 Device: None (Room air)    Physical Exam  Vitals and nursing note reviewed.   Constitutional:       General: She is not in acute distress.     Appearance: She is well-developed.   HENT:      Head: Normocephalic and atraumatic.   Eyes:      Conjunctiva/sclera: Conjunctivae normal.   Cardiovascular:      Rate and Rhythm: Normal rate and regular rhythm.      Heart sounds: No murmur heard.  Pulmonary:      Effort: Pulmonary effort is normal. No respiratory distress.      Breath sounds: Normal breath sounds.   Abdominal:      Palpations: Abdomen is soft.      Tenderness: There is abdominal tenderness.   Musculoskeletal:         General: No swelling.      Cervical back: Neck supple.   Skin:     General: Skin is warm and dry.      Capillary Refill: Capillary refill takes less than 2 seconds.   Neurological:      General: No focal deficit present.      Mental Status: She is alert and oriented to person, place, and time.   Psychiatric:         Mood and Affect: Mood normal.         Behavior: Behavior normal.         Lab Results: I have reviewed the following results:  Results from last 7 days   Lab Units 02/25/25 2028 "   WBC Thousand/uL 5.90   HEMOGLOBIN g/dL 13.2   HEMATOCRIT % 39.8   PLATELETS Thousands/uL 108*   SEGS PCT % 57   LYMPHO PCT % 29   MONO PCT % 9   EOS PCT % 4     Results from last 7 days   Lab Units 02/25/25 2028   SODIUM mmol/L 138   POTASSIUM mmol/L 3.5   CHLORIDE mmol/L 100   CO2 mmol/L 28   BUN mg/dL 17   CREATININE mg/dL 0.92   ANION GAP mmol/L 10   CALCIUM mg/dL 10.0   ALBUMIN g/dL 5.1*   TOTAL BILIRUBIN mg/dL 0.92   ALK PHOS U/L 70   ALT U/L 17   AST U/L 31   GLUCOSE RANDOM mg/dL 173*             Lab Results   Component Value Date    HGBA1C 5.6 01/27/2023           Imaging Results Review: I reviewed radiology reports from this admission including: chest xray and Ultrasound(s).  Other Study Results Review: No additional pertinent studies reviewed.      ** Please Note: This note has been constructed using a voice recognition system. **

## 2025-02-26 NOTE — TELEPHONE ENCOUNTER
Patient  wanted to let you know that she is at [facility] for surgery CHOLECYSTECTOMY LAPAROSCOPIC

## 2025-02-26 NOTE — ASSESSMENT & PLAN NOTE
Recently transition from ACE inhibitor to ARB due to cough  Continue PTA regimen amlodipine 2.5 mg daily, atenolol 50 mg twice daily, ARB  Adjust regimen as needed  /100 on ED arrival  Improved with IV hydralazine, will continue PRN  Trend blood pressures

## 2025-02-26 NOTE — PLAN OF CARE
Problem: PAIN - ADULT  Goal: Verbalizes/displays adequate comfort level or baseline comfort level  Description: Interventions:  - Encourage patient to monitor pain and request assistance  - Assess pain using appropriate pain scale  - Administer analgesics based on type and severity of pain and evaluate response  - Implement non-pharmacological measures as appropriate and evaluate response  - Consider cultural and social influences on pain and pain management  - Notify physician/advanced practitioner if interventions unsuccessful or patient reports new pain  Outcome: Progressing     Problem: INFECTION - ADULT  Goal: Absence or prevention of progression during hospitalization  Description: INTERVENTIONS:  - Assess and monitor for signs and symptoms of infection  - Monitor lab/diagnostic results  - Monitor all insertion sites, i.e. indwelling lines, tubes, and drains  - Monitor endotracheal if appropriate and nasal secretions for changes in amount and color  - [place] appropriate cooling/warming therapies per order  - Administer medications as ordered  - Instruct and encourage patient and family to use good hand hygiene technique  - Identify and instruct in appropriate isolation precautions for identified infection/condition  Outcome: Progressing     Problem: SAFETY ADULT  Goal: Patient will remain free of falls  Description: INTERVENTIONS:  - Educate patient/family on patient safety including physical limitations  - Instruct patient to call for assistance with activity   - Consult OT/PT to assist with strengthening/mobility   - Keep Call bell within reach  - Keep bed low and locked with side rails adjusted as appropriate  - Keep care items and personal belongings within reach  - Initiate and maintain comfort rounds  - Make Fall Risk Sign visible to staff  - Offer Toileting every 2 Hours, in advance of need  - Initiate/Maintain bed/ chair alarm  - Obtain necessary fall risk management equipment:   - Apply yellow  socks and bracelet for high fall risk patients  - Consider moving patient to room near nurses station  Outcome: Progressing  Goal: Maintain or return to baseline ADL function  Description: INTERVENTIONS:  -  Assess patient's ability to carry out ADLs; assess patient's baseline for ADL function and identify physical deficits which impact ability to perform ADLs (bathing, care of mouth/teeth, toileting, grooming, dressing, etc.)  - Assess/evaluate cause of self-care deficits   - Assess range of motion  - Assess patient's mobility; develop plan if impaired  - Assess patient's need for assistive devices and provide as appropriate  - Encourage maximum independence but intervene and supervise when necessary  - Involve family in performance of ADLs  - Assess for home care needs following discharge   - Consider OT consult to assist with ADL evaluation and planning for discharge  - Provide patient education as appropriate  Outcome: Progressing  Goal: Maintains/Returns to pre admission functional level  Description: INTERVENTIONS:  - Perform AM-PAC 6 Click Basic Mobility/ Daily Activity assessment daily.  - Set and communicate daily mobility goal to care team and patient/family/caregiver.   - Collaborate with rehabilitation services on mobility goals if consulted  - Perform Range of Motion 3 times a day.  - Reposition patient every 2 hours.  - Dangle patient 3 times a day  - Stand patient 3 times a day  - Ambulate patient 3 times a day  - Out of bed to chair 3 times a day   - Out of bed for meals 3 times a day  - Out of bed for toileting  - Record patient progress and toleration of activity level   Outcome: Progressing     Problem: DISCHARGE PLANNING  Goal: Discharge to home or other facility with appropriate resources  Description: INTERVENTIONS:  - Identify barriers to discharge w/patient and caregiver  - Arrange for needed discharge resources and transportation as appropriate  - Identify discharge learning needs (meds,  wound care, etc.)  - Arrange for interpretive services to assist at discharge as needed  - Refer to Case Management Department for coordinating discharge planning if the patient needs post-hospital services based on physician/advanced practitioner order or complex needs related to functional status, cognitive ability, or social support system  Outcome: Progressing     Problem: Knowledge Deficit  Goal: Patient/family/caregiver demonstrates understanding of disease process, treatment plan, medications, and discharge instructions  Description: Complete learning assessment and assess knowledge base.  Interventions:  - Provide teaching at level of understanding  - Provide teaching via preferred learning methods  Outcome: Progressing

## 2025-02-26 NOTE — CONSULTS
Consultation - Surgery-General   Name: Ashley Phillips 69 y.o. female I MRN: [id number]  Unit/Bed#: RM18 I Date of Admission: 2/25/2025   Date of Service: 2/26/2025 I Hospital Day: 0   Inpatient consult to Acute Care Surgery  Consult performed by: Navarro Ventura PA-C  Consult ordered by: RADHA Ocampo        Physician Requesting Evaluation: Adelina Peña MD   Reason for Evaluation / Principal Problem: Biliary colic, known cholelithiasis.    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  She reports eating a burger which precipitated her attack.  No previous history of similar symptoms.  Patient had ultrasound of her abdomen performed ordered by her hematologist in [facility] in 2021 for evaluation of splenomegaly, incidental noted finding of gallstones then.    Abdominal ultrasound here showed cholelithiasis without gallbladder wall thickening or pericholecystic fluid.  CBD 6.0 mm.  No choledocholithiasis.    No fever or chills.  Vital signs now with blood pressure improved 113/61 after IV hydralazine.  Initial presentation to the ED around 8 PM last night 206/100.    PCP Dr. Sulaiman Hernandez.    The patient was ordered CTA neck and brain with and without contrast by Dr. Hernandez right-sided cephalgia without vision change performed yesterday as outpatient 2/25, new finding of upper lobe lung, follow-up with CT scan of chest without contrast was ordered but not yet performed an outpatient basis.  This was done here through the ED which showed 3 cm right upper lobe mass abutting the pleura with several pleural tags compatible with primary lung cancer.  No bone  metastasis although CT scan insensitive for detection of early bone metastasis.  Mild ectasia of ascending aorta at 4 cm.  Recommend follow-up CT scan of chest with no contrast in 1 year.  Mild splenomegaly, 14.2 cm and noted cholelithiasis.    On exam patient is no acute distress.   present with her.  Abdomen nondistended.  Positive bowel sounds are heard.  No masses are felt, definite tenderness in the right upper quadrant with positive Stevenson sign elicited, cessation of inspiration with palpation underneath the right midclavicular line costal angle.    Personally discussed with Dr. Eduard Salcido.  Her history and clinical findings and given prior cholelithiasis is consistent with biliary colic, likely chronic calculus cholecystitis which was provoked after eating fatty meal last night.    CBC normal white count 5.90  Hemoglobin 13.2  Platelets 108  BMP normal electrolytes.  Glucose 173.  Creatinine 0.92.  GFR 63.  Lipase normal 78  Hepatic function panel normal total bilirubin 0.92.  AST/ALT 31 and 17.  Albumin 5.1, total protein 7 2  HS Tropinins at 0 and 2 hours both normal.    PLAN:     Monitor blood pressure, seems to be under better control now with IV hydralazine.  Keep n.p.o.  IV fluids for hydration  Plan for laparoscopic cholecystectomy possibly later this afternoon  Analgesics and antiemetics as ordered as needed  No need for antibiotics at this time, will order preop antibiotics for surgical site prophylaxis 2 g IV Ancef.  Remainder of medical management and workup for newly diagnosed right upper lobe lung mass, will need further outpatient follow-up and also workup for possible bone metastasis.  Monitor platelets  Will discuss preoperative management with anesthesia team  Surgical case request and E consent obtained.-  Dr. Eduard Salcido will evaluate the patient later this morning.  Updated Dr. Kyung Peña, attending hospitalist physician    Hypothyroidism  Continue p.o. levothyroxine,  management by medicine service.  Thrombocytopenia (HCC)  Patient followed by Dr. Roberto Fratamico hematologist from [facility].  Platelet count here 108.  Hypertensive crisis  Managed by attending primary service.  BP on arrival 206/100.  IV hydralazine, continue as needed.  Continue to monitor blood pressure.  Lung mass  Discovered, confirmed on CT scan chest.  Right upper lobe mass 3 cm.  Current smoker, 3 cigarettes/day.  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  Denies hemoptysis,  notes occasional dry cough.  Recently transitioned from ACE inhibitor to ARB because of cough.  Patient claims weight unchanged.  Primary osteoarthritis of right hip  Patient with degenerative joint disease right hip, scheduled for right total hip arthroplasty on April 14 in [place] by Dr. Anurag Talamantes.    Surgery-General service will follow.    History of Present Illness     Ashley Phillips is a 69 y.o. female with ITP and essential hypertension who presents with with onset of severe constant right upper quadrant abdominal pain radiating to her back which began last evening and persisted overnight after eating a burger for dinner.  No previous history of similar symptoms.  Nauseated, no vomiting.  No diarrhea or bowel movement changes.  Denies any weight change.  No fever or chills.  She has a history of thrombocytopenia, followed annually by her hematologist in [facility].  Splenomegaly noted on previous abdominal ultrasound, also incidental finding in 2021 of cholelithiasis then.  Of note, the patient has been followed because of headaches more recently and was ordered CT of the head and neck by Dr. Hernandez, PCP.  Patient found with newly discovered right upper lobe lung mass.  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  Recently changed from ACE inhibitor causing dry cough.  Weight has been stable.  Patient was ordered outpatient CT of the chest for further  definition of the right upper lobe mass, this was performed here in the ED which showed a 3 cm mass abutting the pleura with several pleural tags compatible with suspected primary lung cancer.    Patient examined, abdominal tenderness right upper quadrant with positive Stevenson sign.  No masses are felt.  No abdominal hernias.  Discussed with Dr. Eduard Salcido.  Right upper quadrant abdominal ultrasound showed cholelithiasis though no gallbladder wall thickening or PCF.  Labs reviewed, LFTs and total bilirubin normal.  White count normal.    Clinical findings and exam consistent with likely biliary colic with postprandial pain and known cholelithiasis dating back to 2021.    Review of Systems   Constitutional:  Negative for activity change, appetite change, chills, fever and unexpected weight change.   HENT: Negative.  Negative for congestion, sinus pressure, sore throat, trouble swallowing and voice change.    Eyes: Negative.    Respiratory:  Positive for cough. Negative for chest tightness, shortness of breath and wheezing.    Cardiovascular:  Negative for chest pain, palpitations and leg swelling.   Gastrointestinal:  Positive for abdominal pain, diarrhea and nausea. Negative for abdominal distention, constipation and vomiting.   Genitourinary:  Negative for decreased urine volume, difficulty urinating, flank pain and urgency.   Musculoskeletal:  Positive for arthralgias, back pain and gait problem. Negative for neck pain.        Patient with degenerative joint disease of her right hip, she is scheduled for right total hip arthroplasty by Dr. Lon Talamantes in [place] on April 14.   Skin:  Negative for color change, pallor, rash and wound.   Allergic/Immunologic: Negative.    Neurological:  Negative for dizziness, tremors, syncope, weakness, light-headedness and headaches.   Hematological:  Negative for adenopathy. Does not bruise/bleed easily.        History of chronic thrombocytopenia, platelet count here at 108.   Followed by hematologist yearly at [facility].   Psychiatric/Behavioral: Negative.     All other systems reviewed and are negative.      Historical Information   Past Medical History:   Diagnosis Date    Chronic ITP (idiopathic thrombocytopenia) (HCC)     Disease of thyroid gland     Hypertension      Past Surgical History:   Procedure Laterality Date    COLONOSCOPY  10/26/2017    Dr. Guevara - polyp in sigmoid colon removed; moderate diverticulosis in sigmoid colon. Bx: tubular adenoma.    COLONOSCOPY  2012    Dr. Guevara - polyp found in sigmoid colon removed. Moderately severe diverticulosis in the sigmoid colon. Bx: tubular adenoma.    HYSTERECTOMY      THYROIDECTOMY, PARTIAL Left     papillary carcinoma of the thyroid    TOTAL THYROIDECTOMY Bilateral 2009    Hashimotos     Social History     Tobacco Use    Smoking status: Former     Current packs/day: 0.00     Types: Cigarettes     Quit date:      Years since quittin.1    Smokeless tobacco: Never   Vaping Use    Vaping status: Never Used   Substance and Sexual Activity    Alcohol use: Never    Drug use: Never    Sexual activity: Not Currently     E-Cigarette/Vaping    E-Cigarette Use Never User      E-Cigarette/Vaping Substances    Nicotine No     THC No     CBD No     Flavoring No     Other No     Unknown No      Family History   Problem Relation Age of Onset    Leukemia Mother         chronic lymphocytic leukemia    Hashimoto's thyroiditis Mother     Diabetes type II Mother     Lung cancer Father     No Known Problems Daughter     No Known Problems Maternal Grandmother     No Known Problems Maternal Grandfather     No Known Problems Paternal Grandmother     No Known Problems Paternal Grandfather     No Known Problems Brother     No Known Problems Son     No Known Problems Maternal Aunt     No Known Problems Paternal Aunt     Breast cancer Cousin      Social History     Tobacco Use    Smoking status: Former     Current  packs/day: 0.00     Types: Cigarettes     Quit date:      Years since quittin.1    Smokeless tobacco: Never   Vaping Use    Vaping status: Never Used   Substance and Sexual Activity    Alcohol use: Never    Drug use: Never    Sexual activity: Not Currently       Current Facility-Administered Medications:     acetaminophen (TYLENOL) tablet 650 mg, Q6H PRN    hydrALAZINE (APRESOLINE) injection 5 mg, Q6H PRN    HYDROmorphone (DILAUDID) injection 0.5 mg, Q3H PRN    multi-electrolyte (PLASMALYTE-A/ISOLYTE-S PH 7.4) IV solution, Continuous, Last Rate: 75 mL/hr (25 0239)    pantoprazole (PROTONIX) injection 40 mg, Q24H ROBBIE  Prior to Admission Medications   Prescriptions Last Dose Informant Patient Reported? Taking?   Biotin 1000 MCG CHEW   Yes No   Sig: Chew daily   Cholecalciferol (Vitamin D3) 50 MCG (2000 UT) TABS   Yes No   Sig: Take 2,000 Units by mouth daily   albuterol (Ventolin HFA) 90 mcg/act inhaler   No No   Sig: Inhale 2 puffs every 6 (six) hours as needed for wheezing   allopurinol (ZYLOPRIM) 100 mg tablet   No No   Sig: TAKE 1 TABLET BY MOUTH EVERY DAY   amLODIPine (NORVASC) 2.5 mg tablet   No No   Sig: Take 1 tablet (2.5 mg total) by mouth daily   atenolol (TENORMIN) 50 mg tablet   No No   Sig: TAKE 1 TABLET BY MOUTH TWICE A DAY   cyanocobalamin (VITAMIN B-12) 1000 MCG tablet   Yes No   Sig: Take 1,000 mcg by mouth daily   levothyroxine 100 mcg tablet   Yes No   Sig: take 1 tablet by mouth 6 days  PER WEEK, 1/2 TABELT ONE DAY A WEEK   valsartan (DIOVAN) 160 mg tablet   No No   Sig: Take 1 tablet (160 mg total) by mouth daily      Facility-Administered Medications: None     Ciprofloxacin, Erythromycin, and Penicillins    Objective :  Temp:  [96.8 °F (36 °C)-98.7 °F (37.1 °C)] 98.7 °F (37.1 °C)  HR:  [48-85] 85  BP: (113-206)/() 113/61  Resp:  [16-22] 20  SpO2:  [93 %-99 %] 97 %  O2 Device: None (Room air)      Physical Exam  Vitals reviewed.   Constitutional:       General: She is not in  acute distress.     Appearance: She is normal weight. She is not ill-appearing, toxic-appearing or diaphoretic.   HENT:      Head: Normocephalic.      Nose: Nose normal. No congestion.      Mouth/Throat:      Mouth: Mucous membranes are moist.      Pharynx: Oropharynx is clear.   Eyes:      Conjunctiva/sclera: Conjunctivae normal.   Cardiovascular:      Rate and Rhythm: Normal rate and regular rhythm.      Pulses: Normal pulses.      Heart sounds: Normal heart sounds. No murmur heard.     No gallop.   Pulmonary:      Effort: Pulmonary effort is normal. No respiratory distress.      Breath sounds: Normal breath sounds. No stridor. No wheezing, rhonchi or rales.   Chest:      Chest wall: No tenderness.   Abdominal:      General: Bowel sounds are normal. There is no distension.      Palpations: Abdomen is soft. There is no mass.      Tenderness: There is abdominal tenderness. There is no right CVA tenderness, left CVA tenderness, guarding or rebound.      Hernia: No hernia is present.      Comments: Nondistended abdomen.  Positive bowel signs are heard.  Tenderness right upper quadrant, positive Stevenson's sign.  No masses are palpable.  Spleen is not palpable.  No guarding is present.  No mass.  No abdominal wall hernias.   Musculoskeletal:         General: No swelling, tenderness, deformity or signs of injury.      Cervical back: Normal range of motion and neck supple.      Right lower leg: No edema.      Left lower leg: No edema.   Skin:     Capillary Refill: Capillary refill takes less than 2 seconds.      Coloration: Skin is not jaundiced or pale.      Findings: No bruising, erythema or lesion.   Neurological:      General: No focal deficit present.      Mental Status: She is alert and oriented to person, place, and time.      Cranial Nerves: No cranial nerve deficit.      Motor: No weakness.   Psychiatric:         Mood and Affect: Mood normal.         Thought Content: Thought content normal.         Judgment:  Judgment normal.         Lab Results: I have reviewed the following results:  Recent Labs     02/25/25 2028 02/25/25  2233   WBC 5.90  --    HGB 13.2  --    HCT 39.8  --    *  --    SODIUM 138  --    K 3.5  --      --    CO2 28  --    BUN 17  --    CREATININE 0.92  --    GLUC 173*  --    AST 31  --    ALT 17  --    ALB 5.1*  --    TBILI 0.92  --    ALKPHOS 70  --    HSTNI0 3  --    HSTNI2  --  3       Imaging Results Review: I reviewed radiology reports from this admission including: CT chest, CT abdomen/pelvis, CT head, and Ultrasound(s).    RIGHT UPPER QUADRANT ULTRASOUND     INDICATION: ruq abd pain, nausea.     COMPARISON: Abdominal ultrasound August 10, 2013     TECHNIQUE: Real-time ultrasound of the right upper quadrant was performed with a curvilinear transducer with both volumetric sweeps and still imaging techniques.     FINDINGS:     PANCREAS: Visualized portions of the pancreas are within normal limits.     AORTA AND IVC: Visualized portions are normal for patient age.     LIVER:  Size: Mildly enlarged. The liver measures 17.0 cm in the midclavicular line.  Contour: Surface contour is smooth.  Parenchyma: Increased echogenicity compatible with fatty infiltration.  No liver mass identified.  Limited imaging of the main portal vein shows it to be patent and hepatopetal.     BILIARY:  The gallbladder is normal in caliber.  No wall thickening or pericholecystic fluid.  Gallstones are identified.  Evaluation for Stevenson's sign limited due to prior analgesic administration.  No intrahepatic biliary dilatation.  CBD measures 6.0 mm.  No choledocholithiasis.     KIDNEY:  Right kidney measures 10.0 x 4.9 x 4.6 cm. Volume 117.8 mL  Kidney within normal limits.     ASCITES: None.     IMPRESSION:     Cholelithiasis. No gallbladder wall thickening or pericholecystic fluid.     Mild hepatomegaly and hepatic steatosis.    CT CHEST WITHOUT IV CONTRAST     INDICATION:   right lung mass, back pain. Per my  review of the medical record, presented to ED with right upper quadrant pain after eating dinner associated with nausea and diaphoresis. CXR and CTA neck 2/25/2025 with right upper lobe mass. Hashimoto's   thyroiditis. History of thyroid cancer.     COMPARISON: CT neck and CXR 2/25/2025.     TECHNIQUE: Chest CT without intravenous contrast.  Axial, sagittal, coronal 2D reformats and coronal MIPS from source data.     Radiation dose length product (DLP):  177 mGy-cm . Radiation dose exposure minimized using iterative reconstruction and automated exposure control.     FINDINGS:     LUNGS: 3.0 cm slightly lobulated peripheral right upper lobe mass abutting the pleura with several pleural tags. Mild dependent atelectasis in the lower lobes.     AIRWAYS: No significant filling defects.     PLEURA:  Unremarkable.     HEART/GREAT VESSELS: Mild cardiomegaly. Mild coronary artery calcification indicating atherosclerotic heart disease. Mild ectasia of the ascending aorta at 4.0 cm.     MEDIASTINUM AND DONNA:  Unremarkable.     CHEST WALL AND LOWER NECK: Thyroidectomy.     UPPER ABDOMEN: Mild splenomegaly at 14.2 cm. Cholelithiasis corresponding with the right upper quadrant ultrasound.     OSSEOUS STRUCTURES: Mild degenerative disease in the spine with no bone metastases.     IMPRESSION:     3 cm right upper lobe mass abutting the pleura with several pleural tags compatible with primary lung cancer.     No bone metastases although CT can be insensitive for the detection of early bone metastases.     Mild ectasia of the ascending aorta at 4.0 cm. Recommend follow-up with a chest CT with no contrast in 1 year.     Mild splenomegaly at 14.2 cm.     Cholelithiasis.       Other Study Results Review: No additional pertinent studies reviewed.    VTE Pharmacologic Prophylaxis: Heparin  VTE Mechanical Prophylaxis: sequential compression device    Administrative Statements   I have spent a total time of 55 minutes in caring for this  "patient on the day of the visit/encounter including Diagnostic results, Prognosis, Risks and benefits of tx options, Instructions for management, Patient and family education, Importance of tx compliance, Risk factor reductions, Impressions, Counseling / Coordination of care, Documenting in the medical record, Reviewing/placing orders in the medical record (including tests, medications, and/or procedures), Obtaining or reviewing history  , and Communicating with other healthcare professionals .  Discussed in person with Dr. Eduard Salcido regarding patient management.    Navarro Ventura PA-C    **Please Note: Portions of the record may have been created using voice recognition software.  Occasional wrong word or \"sound a like\" substitutions may have occurred due to the inherent limitations of voice recognition software.  Read the chart carefully and recognize, using context, where substitutions have occurred.      "

## 2025-02-26 NOTE — ASSESSMENT & PLAN NOTE
Discovered, confirmed on CT scan chest.  Right upper lobe mass 3 cm.  Current smoker, 3 cigarettes/day.  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  Denies hemoptysis,  notes occasional dry cough.  Recently transitioned from ACE inhibitor to ARB because of cough.  Patient claims weight unchanged.

## 2025-02-26 NOTE — ASSESSMENT & PLAN NOTE
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  She reports eating a burger which precipitated her attack.  No previous history of similar symptoms.  Patient had ultrasound of her abdomen performed ordered by her hematologist in [facility] in 2021 for evaluation of splenomegaly, incidental noted finding of gallstones then.    Abdominal ultrasound here showed cholelithiasis without gallbladder wall thickening or pericholecystic fluid.  CBD 6.0 mm.  No choledocholithiasis.    No fever or chills.  Vital signs now with blood pressure improved 113/61 after IV hydralazine.  Initial presentation to the ED around 8 PM last night 206/100.    PCP Dr. Sulaiman Hernandez.    The patient was ordered CTA neck and brain with and without contrast by Dr. Hernandez right-sided cephalgia without vision change performed yesterday as outpatient 2/25, new finding of upper lobe lung, follow-up with CT scan of chest without contrast was ordered but not yet performed an outpatient basis.  This was done here through the ED which showed 3 cm right upper lobe mass abutting the pleura with several pleural tags compatible with primary lung cancer.  No bone metastasis although CT scan insensitive for detection of early bone metastasis.  Mild ectasia of ascending aorta at 4 cm.  Recommend follow-up CT scan of chest with no contrast in 1 year.  Mild splenomegaly, 14.2 cm and noted cholelithiasis.    On exam patient is no acute distress.   present with her.  Abdomen nondistended.  Positive bowel sounds are heard.  No masses are felt, definite tenderness in the right upper quadrant with positive Stevenson sign elicited, cessation of inspiration  with palpation underneath the right midclavicular line costal angle.    Personally discussed with Dr. Eduard Salcido.  Her history and clinical findings and given prior cholelithiasis is consistent with biliary colic, likely chronic calculus cholecystitis which was provoked after eating fatty meal last night.    CBC normal white count 5.90  Hemoglobin 13.2  Platelets 108  BMP normal electrolytes.  Glucose 173.  Creatinine 0.92.  GFR 63.  Lipase normal 78  Hepatic function panel normal total bilirubin 0.92.  AST/ALT 31 and 17.  Albumin 5.1, total protein 7 2  HS Tropinins at 0 and 2 hours both normal.    PLAN:     Monitor blood pressure, seems to be under better control now with IV hydralazine.  Keep n.p.o.  IV fluids for hydration  Plan for laparoscopic cholecystectomy possibly later this afternoon  Analgesics and antiemetics as ordered as needed  No need for antibiotics at this time, will order preop antibiotics for surgical site prophylaxis 2 g IV Ancef.  Remainder of medical management and workup for newly diagnosed right upper lobe lung mass, will need further outpatient follow-up and also workup for possible bone metastasis.  Monitor platelets  Will discuss preoperative management with anesthesia team  Surgical case request and E consent obtained.-  Dr. Eduard Salicdo will evaluate the patient later this morning.  Updated Dr. Kyung Peña, attending hospitalist physician

## 2025-02-26 NOTE — ANESTHESIA POSTPROCEDURE EVALUATION
Post-Op Assessment Note    CV Status:  Stable    Pain management: adequate       Hydration Status:  Stable   Airway Patency:  Patent     Post Op Vitals Reviewed: Yes    No anethesia notable event occurred.    Staff: Anesthesiologist           Last Filed PACU Vitals:  Vitals Value Taken Time   Temp 97.6    Pulse 80 02/26/25 1814   /62 02/26/25 1812   Resp 21 02/26/25 1814   SpO2 97 % 02/26/25 1814   Vitals shown include unfiled device data.

## 2025-02-26 NOTE — ASSESSMENT & PLAN NOTE
Managed by attending primary service.  BP on arrival 206/100.  IV hydralazine, continue as needed.  Continue to monitor blood pressure.

## 2025-02-26 NOTE — CONSULTS
Consultation - Pulmonology   Name: Ashley Phillips 69 y.o. female I MRN: [id number]  Unit/Bed#: 404-01 I Date of Admission: 2/25/2025   Date of Service: 2/26/2025 I Hospital Day: 0   Inpatient consult to Pulmonology  Consult performed by: Nevaeh Burton DO  Consult ordered by: Adelina Peña MD        Physician Requesting Evaluation: Adelina Peña MD   Reason for Evaluation / Principal Problem: Lung mass    Assessment & Plan  Biliary colic  For gallbladder removal today  Hypothyroidism  Continue oral hypothyroid  Thrombocytopenia (HCC)  Follows with heme-onc  Primary osteoarthritis of right hip    Hypertensive crisis    Lung mass  I reviewed Ashley's CAT scan with her and her .  She does have a lung mass which is likely bronchogenic lung cancer.  If she was an outpatient I would schedule a PET scan PFTs and a lung biopsy.  I like to proceed with CT-guided lung biopsy as soon as possible PFTs and PET scan and likely presentation at tumor board.  I explained to her that she would likely be early stage and possible surgical resection      History of Present Illness   Ashley Phillips is a 69 y.o. female who presents with abdominal pain.  She had a hamburger and French fries and came in with excruciating abdominal pain thought to be acute cholecystitis.  In the meantime her scan showed an incidental finding of lung mass.  She is a smoker smokes 3 to 4 cigarettes for her entire life.  He worked as a nurse at [employer].  She denies any shortness of breath even with exertion she babysits her 2 grandchildren 2 days a week and is quite active.    Review of Systems   Constitutional: Negative.  Negative for unexpected weight change.   HENT: Negative.  Negative for postnasal drip.    Eyes: Negative.    Respiratory: Negative.  Negative for cough, shortness of breath and wheezing.    Cardiovascular: Negative.  Negative for chest pain and leg swelling.   Gastrointestinal: Negative.    Endocrine: Negative.     Genitourinary: Negative.    Musculoskeletal: Negative.    Allergic/Immunologic: Negative.    Neurological: Negative.    Hematological: Negative.        Historical Information   Medical History Review: I have reviewed the patient's PMH, PSH, Social History, Family History, Meds, and Allergies   Tobacco History: Smokes 3 to 4 cigarettes a day  Occupational History: Nurse  Family History:non-contributory    Objective :  Temp:  [96.8 °F (36 °C)-99.2 °F (37.3 °C)] 99.2 °F (37.3 °C)  HR:  [48-88] 88  BP: (113-206)/() 130/82  Resp:  [16-22] 20  SpO2:  [93 %-99 %] 96 %  O2 Device: None (Room air)    Physical Exam  Constitutional:       Appearance: She is well-developed.   HENT:      Head: Normocephalic and atraumatic.   Eyes:      Pupils: Pupils are equal, round, and reactive to light.   Cardiovascular:      Rate and Rhythm: Normal rate and regular rhythm.      Heart sounds: No murmur heard.  Pulmonary:      Effort: Pulmonary effort is normal. No respiratory distress.      Breath sounds: Normal breath sounds. No wheezing or rales.   Abdominal:      Palpations: Abdomen is soft.   Musculoskeletal:      Cervical back: Normal range of motion and neck supple.   Skin:     General: Skin is warm and dry.   Neurological:      Mental Status: She is alert and oriented to person, place, and time.           Lab Results: I have reviewed the following results:  .     02/25/25 2028 02/25/25  2233   WBC 5.90  --    HGB 13.2  --    HCT 39.8  --    *  --    SODIUM 138  --    K 3.5  --      --    CO2 28  --    BUN 17  --    CREATININE 0.92  --    GLUC 173*  --    AST 31  --    ALT 17  --    ALB 5.1*  --    TBILI 0.92  --    ALKPHOS 70  --    HSTNI0 3  --    HSTNI2  --  3     ABG: No new results in last 24 hours.    Imaging Results Review: I personally reviewed the following image studies in PACS and associated radiology reports: CT chest. My interpretation of the radiology images/reports is: Right sided lung mass.    PFT  Results Reviewed: None available

## 2025-02-26 NOTE — ASSESSMENT & PLAN NOTE
Had outpatient CTA head and neck, noted 2.5 cm right lung mass  Chest x-ray obtained in ED with right peripheral lung mass  Will obtain CT chest without contrast to further evaluate lung mass in association with severe back pain

## 2025-02-26 NOTE — ASSESSMENT & PLAN NOTE
Chronic thrombocytopenia   Evaluated by heme-onc in the outpatient setting and felt this is most likely ITP   Platelet count 108 on admission  SCDs for DVT prophylaxis

## 2025-02-26 NOTE — ED PROVIDER NOTES
Time reflects when diagnosis was documented in both MDM as applicable and the Disposition within this note       Time User Action Codes Description Comment    2/25/2025 11:04 PM Susi Yi Add [K80.20] Cholelithiasis     2/25/2025 11:04 PM Susi Yi [K80.50] Biliary colic           ED Disposition       ED Disposition   Admit    Condition   Stable    Date/Time   Tue Feb 25, 2025 11:04 PM    Comment   Case was discussed with DONALD and the patient's admission status was agreed to be Admission Status: observation status to the service of Dr. Peña.               Assessment & Plan       Medical Decision Making  Amount and/or Complexity of Data Reviewed  Labs: ordered.  Radiology: ordered.    Risk  Prescription drug management.  Decision regarding hospitalization.      69-year-old female presenting for evaluation of right upper quadrant abdominal pain, pain started after eating dinner, associated with nausea and diaphoresis.  Patient does have tenderness in the right upper quadrant and epigastric area, concern for biliary colic/cholecystitis, versus pancreatitis versus ACS.  Check CBC to evaluate for anemia or leukocytosis, CMP to evaluate for metabolic abnormality, lipase to evaluate for pancreatitis, EKG and troponin to evaluate for ACS or arrhythmia.  Will obtain right upper quadrant ultrasound.  Will treat symptomatically and reassess.  Reviewed labs, no marked abnormalities.  Reviewed ultrasound, shows cholelithiasis, no evidence of cholecystitis.  Reassessed patient, still having pain, will treat with additional pain medication.  Discussed with surgery, recommended admission to medicine, they will see in the morning to decide on plan for patient. Patient agreeable with plan for admission.         Medications   HYDROmorphone (DILAUDID) injection 0.5 mg (0.5 mg Intravenous Not Given 2/26/25 0102)   hydrALAZINE (APRESOLINE) injection 5 mg (has no administration in time range)   acetaminophen (TYLENOL)  tablet 650 mg (has no administration in time range)   multi-electrolyte (PLASMALYTE-A/ISOLYTE-S PH 7.4) IV solution (75 mL/hr Intravenous Rate/Dose Change 2/26/25 0239)   pantoprazole (PROTONIX) injection 40 mg (has no administration in time range)   ondansetron (ZOFRAN) injection 4 mg (4 mg Intravenous Given 2/25/25 2029)   HYDROmorphone HCl (DILAUDID) injection 0.2 mg (0.2 mg Intravenous Given 2/25/25 2029)   HYDROmorphone HCl (DILAUDID) injection 0.2 mg (0.2 mg Intravenous Given 2/25/25 2203)   HYDROmorphone (DILAUDID) injection 0.5 mg (0.5 mg Intravenous Given 2/26/25 0052)   hydrALAZINE (APRESOLINE) injection 5 mg (5 mg Intravenous Given 2/26/25 0050)   ondansetron (ZOFRAN) injection 4 mg (4 mg Intravenous Given 2/26/25 0116)       ED Risk Strat Scores                            SBIRT 22yo+      Flowsheet Row Most Recent Value   Initial Alcohol Screen: US AUDIT-C     1. How often do you have a drink containing alcohol? 0 Filed at: 02/25/2025 2005   2. How many drinks containing alcohol do you have on a typical day you are drinking?  0 Filed at: 02/25/2025 2005   3b. FEMALE Any Age, or MALE 65+: How often do you have 4 or more drinks on one occassion? 0 Filed at: 02/25/2025 2005   Audit-C Score 0 Filed at: 02/25/2025 2005   BASIL: How many times in the past year have you...    Used an illegal drug or used a prescription medication for non-medical reasons? Never Filed at: 02/25/2025 2005                            History of Present Illness       Chief Complaint   Patient presents with    Chest Pain     Pt presents with epigastric pain that radiates to her back with diaphoresis that started around 6 pm, pt reports having CT with contrast today for head pain and recent high BP readings with new medications        Past Medical History:   Diagnosis Date    Chronic ITP (idiopathic thrombocytopenia) (HCC)     Disease of thyroid gland     Hypertension       Past Surgical History:   Procedure Laterality Date     COLONOSCOPY  10/26/2017    Dr. Guevara - polyp in sigmoid colon removed; moderate diverticulosis in sigmoid colon. Bx: tubular adenoma.    COLONOSCOPY  2012    Dr. Guevara - polyp found in sigmoid colon removed. Moderately severe diverticulosis in the sigmoid colon. Bx: tubular adenoma.    HYSTERECTOMY      THYROIDECTOMY, PARTIAL Left     papillary carcinoma of the thyroid    TOTAL THYROIDECTOMY Bilateral     Hashimotos      Family History   Problem Relation Age of Onset    Leukemia Mother         chronic lymphocytic leukemia    Hashimoto's thyroiditis Mother     Diabetes type II Mother     Lung cancer Father     No Known Problems Daughter     No Known Problems Maternal Grandmother     No Known Problems Maternal Grandfather     No Known Problems Paternal Grandmother     No Known Problems Paternal Grandfather     No Known Problems Brother     No Known Problems Son     No Known Problems Maternal Aunt     No Known Problems Paternal Aunt     Breast cancer Cousin       Social History     Tobacco Use    Smoking status: Former     Current packs/day: 0.00     Types: Cigarettes     Quit date:      Years since quittin.1    Smokeless tobacco: Never   Vaping Use    Vaping status: Never Used   Substance Use Topics    Alcohol use: Never    Drug use: Never      E-Cigarette/Vaping    E-Cigarette Use Never User       E-Cigarette/Vaping Substances    Nicotine No     THC No     CBD No     Flavoring No     Other No     Unknown No       I have reviewed and agree with the history as documented.     HPI    69-year-old female presenting for evaluation of right upper quadrant abdominal pain.  Patient states she ate a hamburger and fries for dinner and then developed pain shortly afterwards.  Pain is in the epigastric and right upper quadrant of abdomen.  Pain radiates into the back.  She states she has never had pain like this in the past.  She has had associated nausea but no vomiting.  Denies any diarrhea.   Denies fevers.  Denies pain to the chest or shortness of breath.  Denies urinary symptoms.  She has had prior vaginal hysterectomy but no other abdominal surgeries.    Review of Systems   Constitutional:  Negative for appetite change, chills and fever.   HENT:  Negative for congestion, rhinorrhea and sore throat.    Respiratory:  Negative for cough and shortness of breath.    Cardiovascular:  Negative for chest pain.   Gastrointestinal:  Positive for abdominal pain and nausea. Negative for diarrhea and vomiting.   Genitourinary:  Negative for dysuria, frequency, hematuria and urgency.   Musculoskeletal:  Negative for arthralgias and myalgias.   Skin:  Negative for rash.   Neurological:  Negative for dizziness, weakness, light-headedness, numbness and headaches.   All other systems reviewed and are negative.          Objective       ED Triage Vitals   Temperature Pulse Blood Pressure Respirations SpO2 Patient Position - Orthostatic VS   02/25/25 2005 02/25/25 2005 02/25/25 2005 02/25/25 2005 02/25/25 2005 --   (!) 96.8 °F (36 °C) 60 (!) 206/100 18 99 %       Temp Source Heart Rate Source BP Location FiO2 (%) Pain Score    02/25/25 2005 -- -- -- 02/25/25 2029    Temporal    9      Vitals      Date and Time Temp Pulse SpO2 Resp BP Pain Score FACES Pain Rating User   02/26/25 0200 -- 59 93 % 21 160/74 3 --    02/26/25 0100 98.5 °F (36.9 °C) 62 98 % 17 156/69 3 --    02/26/25 0052 -- -- -- -- -- 6 --    02/26/25 0031 -- 50 -- 16 195/81 6 --    02/26/25 0000 -- 53 97 % 21 205/91 6 --    02/25/25 2300 -- 48 96 % 20 170/73 6 --    02/25/25 2203 98.2 °F (36.8 °C) 56 98 % 16 187/84 8 --    02/25/25 2100 -- 53 98 % 22 176/79 5 --    02/25/25 2030 -- 53 99 % 20 192/85 -- --    02/25/25 2029 -- -- -- -- -- 9 --    02/25/25 2005 96.8 °F (36 °C) 60 99 % 18 206/100 -- --             Physical Exam  Vitals and nursing note reviewed.   Constitutional:       General: She is not in acute distress.     Appearance:  Normal appearance. She is well-developed and normal weight. She is not ill-appearing, toxic-appearing or diaphoretic.   HENT:      Head: Normocephalic and atraumatic.      Right Ear: External ear normal.      Left Ear: External ear normal.      Nose: Nose normal.      Mouth/Throat:      Mouth: Mucous membranes are moist.      Pharynx: Oropharynx is clear.   Eyes:      Extraocular Movements: Extraocular movements intact.      Conjunctiva/sclera: Conjunctivae normal.   Cardiovascular:      Rate and Rhythm: Normal rate and regular rhythm.      Pulses: Normal pulses.      Heart sounds: Normal heart sounds. No murmur heard.     No friction rub. No gallop.   Pulmonary:      Effort: Pulmonary effort is normal. No respiratory distress.      Breath sounds: Normal breath sounds. No wheezing or rales.   Abdominal:      General: There is no distension.      Palpations: Abdomen is soft.      Tenderness: There is abdominal tenderness. There is no guarding or rebound.      Comments: RUQ and epigastric tenderness.   Musculoskeletal:         General: No tenderness.      Cervical back: Neck supple.      Right lower leg: No tenderness. No edema.      Left lower leg: No tenderness. No edema.   Skin:     General: Skin is warm and dry.      Coloration: Skin is not pale.      Findings: No erythema or rash.   Neurological:      General: No focal deficit present.      Mental Status: She is alert and oriented to person, place, and time.      Cranial Nerves: No cranial nerve deficit.      Sensory: No sensory deficit.      Motor: No weakness.   Psychiatric:         Mood and Affect: Mood normal.         Behavior: Behavior normal.         Results Reviewed       Procedure Component Value Units Date/Time    HS Troponin I 2hr [[id number]]  (Normal) Collected: 02/25/25 2233    Lab Status: Final result Specimen: Blood from Arm, Left Updated: 02/25/25 2303     hs TnI 2hr 3 ng/L      Delta 2hr hsTnI 0 ng/L     HS Troponin 0hr (reflex protocol)  [[id number]]  (Normal) Collected: 02/25/25 2028    Lab Status: Final result Specimen: Blood from Arm, Left Updated: 02/25/25 2111     hs TnI 0hr 3 ng/L     Basic metabolic panel [[id number]]  (Abnormal) Collected: 02/25/25 2028    Lab Status: Final result Specimen: Blood from Arm, Left Updated: 02/25/25 2103     Sodium 138 mmol/L      Potassium 3.5 mmol/L      Chloride 100 mmol/L      CO2 28 mmol/L      ANION GAP 10 mmol/L      BUN 17 mg/dL      Creatinine 0.92 mg/dL      Glucose 173 mg/dL      Calcium 10.0 mg/dL      eGFR 63 ml/min/1.73sq m     Narrative:      National Kid[employer] Foundation guidelines for Chronic Kidney Disease (CKD):     Stage 1 with normal or high GFR (GFR > 90 mL/min/1.73 square meters)    Stage 2 Mild CKD (GFR = 60-89 mL/min/1.73 square meters)    Stage 3A Moderate CKD (GFR = 45-59 mL/min/1.73 square meters)    Stage 3B Moderate CKD (GFR = 30-44 mL/min/1.73 square meters)    Stage 4 Severe CKD (GFR = 15-29 mL/min/1.73 square meters)    Stage 5 End Stage CKD (GFR <15 mL/min/1.73 square meters)  Note: GFR calculation is accurate only with a steady state creatinine    Lipase [[id number]]  (Normal) Collected: 02/25/25 2028    Lab Status: Final result Specimen: Blood from Arm, Left Updated: 02/25/25 2103     Lipase 78 u/L     Hepatic function panel [[id number]]  (Abnormal) Collected: 02/25/25 2028    Lab Status: Final result Specimen: Blood from Arm, Left Updated: 02/25/25 2103     Total Bilirubin 0.92 mg/dL      Bilirubin, Direct 0.17 mg/dL      Alkaline Phosphatase 70 U/L      AST 31 U/L      ALT 17 U/L      Total Protein 7.2 g/dL      Albumin 5.1 g/dL     CBC and differential [[id number]]  (Abnormal) Collected: 02/25/25 2028    Lab Status: Final result Specimen: Blood from Arm, Left Updated: 02/25/25 2101     WBC 5.90 Thousand/uL      RBC 4.22 Million/uL      Hemoglobin 13.2 g/dL      Hematocrit 39.8 %      MCV 94 fL      MCH 31.3 pg      MCHC 33.2 g/dL      RDW 13.5 %      MPV 10.3 fL       Platelets 108 Thousands/uL      nRBC 0 /100 WBCs      Segmented % 57 %      Immature Grans % 0 %      Lymphocytes % 29 %      Monocytes % 9 %      Eosinophils Relative 4 %      Basophils Relative 1 %      Absolute Neutrophils 3.39 Thousands/µL      Absolute Immature Grans 0.01 Thousand/uL      Absolute Lymphocytes 1.70 Thousands/µL      Absolute Monocytes 0.54 Thousand/µL      Eosinophils Absolute 0.22 Thousand/µL      Basophils Absolute 0.04 Thousands/µL             US right upper quadrant   Final Interpretation by Sammy Gallegos MD (02/25 2144)      Cholelithiasis. No gallbladder wall thickening or pericholecystic fluid.      Mild hepatomegaly and hepatic steatosis.      Workstation performed: [id number]         XR chest portable    (Results Pending)   CT chest wo contrast    (Results Pending)       ECG 12 Lead Documentation Only    Date/Time: 2/26/2025 2:53 AM    Performed by: Susi Yi MD  Authorized by: Susi Yi MD    Indications / Diagnosis:  Chest pain  ECG reviewed by me, the ED Provider: yes    Patient location:  ED  Previous ECG:     Previous ECG:  Compared to current    Similarity:  No change    Comparison to cardiac monitor: Yes    Interpretation:     Interpretation: normal    Rate:     ECG rate:  60    ECG rate assessment: normal    Rhythm:     Rhythm: sinus rhythm    Ectopy:     Ectopy: none    QRS:     QRS axis:  Normal    QRS intervals:  Normal  Conduction:     Conduction: normal    ST segments:     ST segments:  Normal  T waves:     T waves: normal        ED Medication and Procedure Management   Prior to Admission Medications   Prescriptions Last Dose Informant Patient Reported? Taking?   Biotin 1000 MCG CHEW   Yes No   Sig: Chew daily   Cholecalciferol (Vitamin D3) 50 MCG (2000 UT) TABS   Yes No   Sig: Take 2,000 Units by mouth daily   albuterol (Ventolin HFA) 90 mcg/act inhaler   No No   Sig: Inhale 2 puffs every 6 (six) hours as needed for wheezing   allopurinol (ZYLOPRIM) 100  mg tablet   No No   Sig: TAKE 1 TABLET BY MOUTH EVERY DAY   amLODIPine (NORVASC) 2.5 mg tablet   No No   Sig: Take 1 tablet (2.5 mg total) by mouth daily   atenolol (TENORMIN) 50 mg tablet   No No   Sig: TAKE 1 TABLET BY MOUTH TWICE A DAY   cyanocobalamin (VITAMIN B-12) 1000 MCG tablet   Yes No   Sig: Take 1,000 mcg by mouth daily   levothyroxine 100 mcg tablet   Yes No   Sig: take 1 tablet by mouth 6 days  PER WEEK, 1/2 TABELT ONE DAY A WEEK   valsartan (DIOVAN) 160 mg tablet   No No   Sig: Take 1 tablet (160 mg total) by mouth daily      Facility-Administered Medications: None     Patient's Medications   Discharge Prescriptions    No medications on file     No discharge procedures on file.  ED SEPSIS DOCUMENTATION   Time reflects when diagnosis was documented in both MDM as applicable and the Disposition within this note       Time User Action Codes Description Comment    2/25/2025 11:04 PM Susi Yi [K80.20] Cholelithiasis     2/25/2025 11:04 PM Susi Yi [K80.50] Biliary colic                  Susi Yi MD  02/26/25 0256

## 2025-02-26 NOTE — ASSESSMENT & PLAN NOTE
Presented from home after eating a cheeseburger and fries for evening meal with right sided upper quadrant abdominal pain radiating to back with nausea  Abdominal ultrasound with cholelithiasis. No gallbladder wall thickening or pericholecystic fluid   no fever or leukocytosis  We will keep n.p.o., IV hydration  Appreciate general surgery consultation  Pain control

## 2025-02-27 ENCOUNTER — APPOINTMENT (INPATIENT)
Dept: NUCLEAR MEDICINE | Facility: HOSPITAL | Age: 70
DRG: 418 | End: 2025-02-27
Payer: MEDICARE

## 2025-02-27 ENCOUNTER — APPOINTMENT (INPATIENT)
Dept: MRI IMAGING | Facility: HOSPITAL | Age: 70
DRG: 418 | End: 2025-02-27
Payer: MEDICARE

## 2025-02-27 PROBLEM — R17 ELEVATED BILIRUBIN: Status: ACTIVE | Noted: 2025-02-27

## 2025-02-27 LAB
ALBUMIN SERPL BCG-MCNC: 4.3 G/DL (ref 3.5–5)
ALP SERPL-CCNC: 140 U/L (ref 34–104)
ALT SERPL W P-5'-P-CCNC: 222 U/L (ref 7–52)
ANION GAP SERPL CALCULATED.3IONS-SCNC: 10 MMOL/L (ref 4–13)
AST SERPL W P-5'-P-CCNC: 257 U/L (ref 13–39)
BASOPHILS # BLD AUTO: 0.01 THOUSANDS/ÂΜL (ref 0–0.1)
BASOPHILS NFR BLD AUTO: 0 % (ref 0–1)
BILIRUB DIRECT SERPL-MCNC: 4.07 MG/DL (ref 0–0.2)
BILIRUB SERPL-MCNC: 6.41 MG/DL (ref 0.2–1)
BUN SERPL-MCNC: 18 MG/DL (ref 5–25)
CALCIUM SERPL-MCNC: 9.4 MG/DL (ref 8.4–10.2)
CHLORIDE SERPL-SCNC: 101 MMOL/L (ref 96–108)
CO2 SERPL-SCNC: 27 MMOL/L (ref 21–32)
CREAT SERPL-MCNC: 0.86 MG/DL (ref 0.6–1.3)
EOSINOPHIL # BLD AUTO: 0 THOUSAND/ÂΜL (ref 0–0.61)
EOSINOPHIL NFR BLD AUTO: 0 % (ref 0–6)
ERYTHROCYTE [DISTWIDTH] IN BLOOD BY AUTOMATED COUNT: 13.6 % (ref 11.6–15.1)
GFR SERPL CREATININE-BSD FRML MDRD: 68 ML/MIN/1.73SQ M
GLUCOSE SERPL-MCNC: 131 MG/DL (ref 65–140)
HCT VFR BLD AUTO: 34.9 % (ref 34.8–46.1)
HGB BLD-MCNC: 11.7 G/DL (ref 11.5–15.4)
IMM GRANULOCYTES # BLD AUTO: 0.03 THOUSAND/UL (ref 0–0.2)
IMM GRANULOCYTES NFR BLD AUTO: 0 % (ref 0–2)
LYMPHOCYTES # BLD AUTO: 0.91 THOUSANDS/ÂΜL (ref 0.6–4.47)
LYMPHOCYTES NFR BLD AUTO: 11 % (ref 14–44)
MCH RBC QN AUTO: 31.4 PG (ref 26.8–34.3)
MCHC RBC AUTO-ENTMCNC: 33.5 G/DL (ref 31.4–37.4)
MCV RBC AUTO: 94 FL (ref 82–98)
MONOCYTES # BLD AUTO: 0.41 THOUSAND/ÂΜL (ref 0.17–1.22)
MONOCYTES NFR BLD AUTO: 5 % (ref 4–12)
NEUTROPHILS # BLD AUTO: 7.32 THOUSANDS/ÂΜL (ref 1.85–7.62)
NEUTS SEG NFR BLD AUTO: 84 % (ref 43–75)
NRBC BLD AUTO-RTO: 0 /100 WBCS
PLATELET # BLD AUTO: 95 THOUSANDS/UL (ref 149–390)
PMV BLD AUTO: 10.7 FL (ref 8.9–12.7)
POTASSIUM SERPL-SCNC: 4.2 MMOL/L (ref 3.5–5.3)
PROT SERPL-MCNC: 6.9 G/DL (ref 6.4–8.4)
RBC # BLD AUTO: 3.73 MILLION/UL (ref 3.81–5.12)
SODIUM SERPL-SCNC: 138 MMOL/L (ref 135–147)
WBC # BLD AUTO: 8.68 THOUSAND/UL (ref 4.31–10.16)

## 2025-02-27 PROCEDURE — 85025 COMPLETE CBC W/AUTO DIFF WBC: CPT | Performed by: SURGERY

## 2025-02-27 PROCEDURE — 99233 SBSQ HOSP IP/OBS HIGH 50: CPT | Performed by: FAMILY MEDICINE

## 2025-02-27 PROCEDURE — 80053 COMPREHEN METABOLIC PANEL: CPT | Performed by: SURGERY

## 2025-02-27 PROCEDURE — 78226 HEPATOBILIARY SYSTEM IMAGING: CPT

## 2025-02-27 PROCEDURE — 99232 SBSQ HOSP IP/OBS MODERATE 35: CPT | Performed by: PHYSICIAN ASSISTANT

## 2025-02-27 PROCEDURE — 82248 BILIRUBIN DIRECT: CPT | Performed by: STUDENT IN AN ORGANIZED HEALTH CARE EDUCATION/TRAINING PROGRAM

## 2025-02-27 PROCEDURE — A9537 TC99M MEBROFENIN: HCPCS

## 2025-02-27 PROCEDURE — 74181 MRI ABDOMEN W/O CONTRAST: CPT

## 2025-02-27 PROCEDURE — 99024 POSTOP FOLLOW-UP VISIT: CPT | Performed by: SURGERY

## 2025-02-27 RX ADMIN — OXYCODONE HYDROCHLORIDE 10 MG: 10 TABLET ORAL at 18:01

## 2025-02-27 RX ADMIN — PANTOPRAZOLE SODIUM 40 MG: 40 INJECTION, POWDER, FOR SOLUTION INTRAVENOUS at 09:58

## 2025-02-27 RX ADMIN — HYDROMORPHONE HYDROCHLORIDE 0.5 MG: 1 INJECTION, SOLUTION INTRAMUSCULAR; INTRAVENOUS; SUBCUTANEOUS at 21:21

## 2025-02-27 RX ADMIN — LEVOTHYROXINE SODIUM 100 MCG: 100 TABLET ORAL at 21:21

## 2025-02-27 RX ADMIN — HEPARIN SODIUM 5000 UNITS: 5000 INJECTION, SOLUTION INTRAVENOUS; SUBCUTANEOUS at 21:21

## 2025-02-27 RX ADMIN — OXYCODONE HYDROCHLORIDE 10 MG: 10 TABLET ORAL at 11:09

## 2025-02-27 RX ADMIN — HEPARIN SODIUM 5000 UNITS: 5000 INJECTION, SOLUTION INTRAVENOUS; SUBCUTANEOUS at 06:31

## 2025-02-27 RX ADMIN — HYDROMORPHONE HYDROCHLORIDE 0.5 MG: 1 INJECTION, SOLUTION INTRAMUSCULAR; INTRAVENOUS; SUBCUTANEOUS at 06:30

## 2025-02-27 RX ADMIN — OXYCODONE HYDROCHLORIDE 10 MG: 10 TABLET ORAL at 02:30

## 2025-02-27 NOTE — ASSESSMENT & PLAN NOTE
Ashley Phillips is a 69 y.o. female s/p 2/26 lap adrianne for cholecystitis    AVSS  T bili 6.4 from .92 pre op  Hgb 11.7 from 13.2    PLAN:   Will check MRCP this morning  NPO for now

## 2025-02-27 NOTE — ASSESSMENT & PLAN NOTE
Recently transitioned from ACE inhibitor to ARB due to cough  Continue PTA regimen amlodipine 2.5 mg daily, atenolol 50 mg twice daily, ARB  /100 on ED arrival  Improved with IV hydralazine, will continue PRN  Blood pressure improved and was likely elevated this is a reactive process in setting of acute pain and inflammation

## 2025-02-27 NOTE — ASSESSMENT & PLAN NOTE
Noted postoperatively, most likely reactive  There was concern for a biliary leak, however reassuring results on both MRI/MRCP and HIDA scan  Patient's diet is now advanced  Check LFTs in a.m.

## 2025-02-27 NOTE — ASSESSMENT & PLAN NOTE
Status post laparoscopic cholecystectomy 2/26/2025  General surgery following  Now with increased bilirubin, MRCP pending

## 2025-02-27 NOTE — CASE MANAGEMENT
Case Management Assessment & Discharge Planning Note    Patient name Ashley Phillips  Location /404-01 MRN [id number]  : 1955 Date 2025       Current Admission Date: 2025  Current Admission Diagnosis:Biliary colic   Patient Active Problem List    Diagnosis Date Noted Date Diagnosed    Biliary colic 2025     Hypertensive crisis 2025     Lung mass 2025     Recurrent occipital headache 2025     Primary osteoarthritis of right hip 2025     Chronic right-sided low back pain with right-sided sciatica 2024     Wheeze 2024     Idiopathic gout involving toe of left foot 2024     Autoimmune thrombocytopenia (HCC) 2024     Thyroid cancer (HCC) 2024     Sacroiliitis (HCC) 2023     Thrombocytopenia (HCC) 2023     Essential hypertension 2023     FELICIA positive 2022     Splenomegaly 2022     History of thyroid cancer 2015     Hypothyroidism 2015       LOS (days): 1  Geometric Mean LOS (GMLOS) (days): 3.3  Days to GMLOS:2.5     OBJECTIVE:    Risk of Unplanned Readmission Score: 10.2         Current admission status: Inpatient       Preferred Pharmacy:   [id number] [place] [facility] [address] [place] [address]  [address] [facility] [address]  Phone: [phone] Fax: [phone]    Primary Care Provider: Sulaiman Hernandez DO    Primary Insurance: MEDICARE  Secondary Insurance: AARP    ASSESSMENT:  Active Health Care Proxies       Wilton Phillips Health Care Representative - Spouse   Primary Phone: [phone] (Home)                 Advance Directives  Does patient have a Health Care POA?: No  Was patient offered paperwork?: Yes (declined)  Does patient currently have a Health Care decision maker?: Yes, please see Health Care Proxy section  Does patient have Advance Directives?: No  Was patient offered paperwork?: Yes (declined)  Primary Contact: Wilton Phillips (Spouse)              Patient  Information  Admitted from:: Home  Mental Status: Alert  During Assessment patient was accompanied by: Spouse  Assessment information provided by:: Patient  Primary Caregiver: Self  Support Systems: Self  [facility] [place]idence: [facility]  What [facility] do you live in?: [place]  Type of Current Residence: Bi-level  Upon entering residence, is there a bedroom on the main floor (no further steps)?: No  A bedroom is located on the following floor levels of residence (select all that apply):: 2nd Floor  Upon entering residence, is there a bathroom on the main floor (no further steps)?: No  Indicate which floors of current residence have a bathroom (select all the apply):: 2nd Floor  Number of steps to 2nd floor from main floor: One Flight  Living Arrangements: Lives w/ Parent(s)  Is patient a ?: No    Activities of Daily Living Prior to Admission  Functional Status: Independent  Completes ADLs independently?: Yes  Ambulates independently?: Yes  Does patient use assisted devices?: No  Does patient currently own DME?: No  Does patient have a history of Outpatient Therapy (PT/OT)?: No  Does the patient have a history of Short-Term Rehab?: No  Does patient have a history of HHC?: No  Does patient currently have HHC?: No         Patient Information Continued  Income Source: SSI/SSD  Does patient have prescription coverage?: Yes  Does patient receive dialysis treatments?: No  Does patient have a history of substance abuse?: No  Does patient have a history of Mental Health Diagnosis?: No (some anxiety today due to medical issue)         Means of Transportation  Means of Transport to [facility]:: Drives Self          DISCHARGE DETAILS:    Discharge planning discussed with:: patient and spouse        CM contacted family/caregiver?: Yes (spouse at bedside)  Were Treatment Team discharge recommendations reviewed with patient/caregiver?: Yes  Did patient/caregiver verbalize understanding of patient care needs?: Yes  Were  patient/caregiver advised of the risks associated with not following Treatment Team discharge recommendations?: Yes    Contacts  Contact Method: In Person  Reason/Outcome: Discharge Planning    Requested Home Health Care         Is the patient interested in C at discharge?: No      Would you like to participate in our [facility] service program?  : No - Declined    Treatment Team Recommendation: Home  Discharge Destination Plan:: Home   Cm met with the patient to evaluate the patients prior function and living situation and any barriers to d/c and form a safe d/c plan. Cm also evaluated the patient for any services in the home or needs for services. CM also discussed with patient that their preferences will be taken into account/consideration.  Pt had lap adrianne done yesterday. Having MRCP today. No current discharge needs noted.

## 2025-02-27 NOTE — PROGRESS NOTES
"Progress Note - Hospitalist   Name: Ashley Phillips 69 y.o. female I MRN: [id number]  Unit/Bed#: 404-01 I Date of Admission: 2/25/2025   Date of Service: 2/27/2025 I Hospital Day: 1    Assessment & Plan  Biliary colic  Presented from home after eating a cheeseburger and fries for evening meal with right sided upper quadrant abdominal pain radiating to back with nausea  Abdominal ultrasound with cholelithiasis. No gallbladder wall thickening or pericholecystic fluid   no fever or leukocytosis  The patient underwent a laparoscopic cholecystectomy 2/26/2025 with perioperative findings consistent with acute cholecystitis  This morning she was noted for an elevated bilirubin level for which she underwent additional imaging including MRI/MRCP and HIDA scan.  Fortunately no evidence of obstruction or biliary leak  Her diet is advanced to regular  Check LFTs in a.m.  Hypertensive crisis  Recently transitioned from ACE inhibitor to ARB due to cough  Continue PTA regimen amlodipine 2.5 mg daily, atenolol 50 mg twice daily, ARB  /100 on ED arrival  Improved with IV hydralazine, will continue PRN  Blood pressure improved and was likely elevated this is a reactive process in setting of acute pain and inflammation  Lung mass  Had outpatient CTA head and neck, noted 2.5 cm right lung mass  CT chest on current admission reveals a \"3 cm right upper lobe mass abutting the pleura with several pleural tags compatible with primary lung cancer.\"  Appreciate pulmonology input.  Plan for a PET scan, mass biopsy, PFTs likely outpatient to be scheduled on a short-term basis  Hypothyroidism  Continue levothyroxine   Thrombocytopenia (HCC)  Chronic thrombocytopenia   Evaluated by heme-onc in the outpatient setting and felt this is most likely ITP   Platelet count 108 on admission  SCDs for DVT prophylaxis  Noted for significant splenomegaly on imaging.  Will need to address this with heme-onc  Splenomegaly  Noted on imaging  To address " with heme-onc outpatient  Primary osteoarthritis of right hip  Scheduled for hip replacement in near future  Elevated bilirubin  Noted postoperatively, most likely reactive  There was concern for a biliary leak, however reassuring results on both MRI/MRCP and HIDA scan  Patient's diet is now advanced  Check LFTs in a.m.    VTE Pharmacologic Prophylaxis:    heparin SQ    Mobility:   Basic Mobility Inpatient Raw Score: 24  JH-HLM Goal: 8: Walk 250 feet or more  JH-HLM Achieved: 8: Walk 250 feet ot more      Patient Centered Rounds: I performed bedside rounds with nursing staff today.   Discussions with Specialists or Other Care Team Provider: General surgery    Education and Discussions with Family / Patient: Updated  ( and son) at bedside.    Current Length of Stay: 1 day(s)  Current Patient Status: Inpatient   Certification Statement: The patient will continue to require additional inpatient hospital stay due to close monitoring  Discharge Plan: Anticipate discharge in 24-48 hrs to home.    Code Status: Level 1 - Full Code    Subjective   Patient reports feeling well and would like to be able to eat.  She states that her postoperative pain is controlled.    Objective :  Temp:  [97.9 °F (36.6 °C)-98.4 °F (36.9 °C)] 97.9 °F (36.6 °C)  HR:  [72-79] 75  BP: (111-144)/(59-78) 125/78  Resp:  [18] 18  SpO2:  [92 %-94 %] 93 %  O2 Device: Nasal cannula    Body mass index is 24.45 kg/m².     Input and Output Summary (last 24 hours):     Intake/Output Summary (Last 24 hours) at 2/27/2025 1846  Last data filed at 2/27/2025 0900  Gross per 24 hour   Intake 0 ml   Output --   Net 0 ml       Physical Exam  Constitutional:       General: She is not in acute distress.  HENT:      Head: Normocephalic and atraumatic.      Nose: No congestion.   Eyes:      Conjunctiva/sclera: Conjunctivae normal.   Cardiovascular:      Rate and Rhythm: Normal rate and regular rhythm.      Heart sounds: No murmur heard.  Pulmonary:       Effort: No respiratory distress.   Abdominal:      General: There is no distension.      Tenderness: There is abdominal tenderness. There is no guarding.   Musculoskeletal:      Right lower leg: No edema.      Left lower leg: No edema.   Skin:     General: Skin is warm and dry.   Neurological:      Mental Status: She is oriented to person, place, and time.   Psychiatric:         Mood and Affect: Mood normal.         Lines/Drains:              Lab Results: I have reviewed the following results:   Results from last 7 days   Lab Units 02/27/25  0504   WBC Thousand/uL 8.68   HEMOGLOBIN g/dL 11.7   HEMATOCRIT % 34.9   PLATELETS Thousands/uL 95*   SEGS PCT % 84*   LYMPHO PCT % 11*   MONO PCT % 5   EOS PCT % 0     Results from last 7 days   Lab Units 02/27/25  0504   SODIUM mmol/L 138   POTASSIUM mmol/L 4.2   CHLORIDE mmol/L 101   CO2 mmol/L 27   BUN mg/dL 18   CREATININE mg/dL 0.86   ANION GAP mmol/L 10   CALCIUM mg/dL 9.4   ALBUMIN g/dL 4.3   TOTAL BILIRUBIN mg/dL 6.41*   ALK PHOS U/L 140*   ALT U/L 222*   AST U/L 257*   GLUCOSE RANDOM mg/dL 131                       Recent Cultures (last 7 days):         Imaging Results Review: I reviewed radiology reports from this admission including: MRI abdomen/MRCP. And HIDA      Last 24 Hours Medication List:     Current Facility-Administered Medications:     acetaminophen (TYLENOL) tablet 650 mg, Q6H PRN    heparin (porcine) subcutaneous injection 5,000 Units, Q8H ROBBIE    hydrALAZINE (APRESOLINE) injection 5 mg, Q6H PRN    HYDROmorphone (DILAUDID) injection 0.5 mg, Q3H PRN    levothyroxine tablet 100 mcg, HS    multi-electrolyte (PLASMALYTE-A/ISOLYTE-S PH 7.4) IV solution, Continuous, Last Rate: 100 mL/hr (02/26/25 2204)    ondansetron (ZOFRAN) injection 4 mg, Q8H PRN    oxyCODONE (ROXICODONE) immediate release tablet 10 mg, Q6H PRN    pantoprazole (PROTONIX) injection 40 mg, Q24H [facility]    Administrative Statements   Today, Patient Was Seen By: Adelina Peña MD  I have  spent a total time of 52 minutes in caring for this patient on the day of the visit/encounter including Diagnostic results, Patient and family education, Impressions, Counseling / Coordination of care, Documenting in the medical record, Reviewing/placing orders in the medical record (including tests, medications, and/or procedures), Obtaining or reviewing history  , and Communicating with other healthcare professionals .    **Please Note: This note may have been constructed using a voice recognition system.**

## 2025-02-27 NOTE — PLAN OF CARE
Problem: PAIN - ADULT  Goal: Verbalizes/displays adequate comfort level or baseline comfort level  Description: Interventions:  - Encourage patient to monitor pain and request assistance  - Assess pain using appropriate pain scale  - Administer analgesics based on type and severity of pain and evaluate response  - Implement non-pharmacological measures as appropriate and evaluate response  - Consider cultural and social influences on pain and pain management  - Notify physician/advanced practitioner if interventions unsuccessful or patient reports new pain  Outcome: Progressing     Problem: SAFETY ADULT  Goal: Patient will remain free of falls  Description: INTERVENTIONS:  - Educate patient/family on patient safety including physical limitations  - Instruct patient to call for assistance with activity   - Consult OT/PT to assist with strengthening/mobility   - Keep Call bell within reach  - Keep bed low and locked with side rails adjusted as appropriate  - Keep care items and personal belongings within reach  - Initiate and maintain comfort rounds  - Make Fall Risk Sign visible to staff  - Offer Toileting every 2 Hours, in advance of need  - Initiate/Maintain bed/chairalarm  - Obtain necessary fall risk management equipment: non slip socks  - Apply yellow socks and bracelet for high fall risk patients  - Consider moving patient to room near nurses station  Outcome: Progressing  Goal: Maintain or return to baseline ADL function  Description: INTERVENTIONS:  -  Assess patient's ability to carry out ADLs; assess patient's baseline for ADL function and identify physical deficits which impact ability to perform ADLs (bathing, care of mouth/teeth, toileting, grooming, dressing, etc.)  - Assess/evaluate cause of self-care deficits   - Assess range of motion  - Assess patient's mobility; develop plan if impaired  - Assess patient's need for assistive devices and provide as appropriate  - Encourage maximum independence but  intervene and supervise when necessary  - Involve family in performance of ADLs  - Assess for home care needs following discharge   - Consider OT consult to assist with ADL evaluation and planning for discharge  - Provide patient education as appropriate  Outcome: Progressing  Goal: Maintains/Returns to pre admission functional level  Description: INTERVENTIONS:  - Perform AM-PAC 6 Click Basic Mobility/ Daily Activity assessment daily.  - Set and communicate daily mobility goal to care team and patient/family/caregiver.   - Collaborate with rehabilitation services on mobility goals if consulted  - Perform Range of Motion 3 times a day.  - Reposition patient every 2 hours.  - Dangle patient 3 times a day  - Stand patient 3 times a day  - Ambulate patient 3 times a day  - Out of bed to chair 3 times a day   - Out of bed for meals 3 times a day  - Out of bed for toileting  - Record patient progress and toleration of activity level   Outcome: Progressing

## 2025-02-27 NOTE — ASSESSMENT & PLAN NOTE
Patient with degenerative joint disease right hip, scheduled for right total hip arthroplasty on April 14 in [place] by Dr. Anurag Talamantes.

## 2025-02-27 NOTE — OP NOTE
OPERATIVE REPORT  PATIENT NAME: Ashley Phillips    :  1955  MRN: [id number]  Pt Location: [facility] OR ROOM 01    SURGERY DATE: 2025    Surgeons and Role:     * Eduard Salcido DO - Primary     * Eduard Randolph MD - Assisting    Preop Diagnosis:  Cholelithiasis [K80.20]  Biliary colic [K80.50]    Post-Op Diagnosis Codes:     * Cholelithiasis [K80.20]     * Biliary colic [K80.50]    Procedure(s):  CHOLECYSTECTOMY LAPAROSCOPIC; REPAIR OF SUPRAUMBILICAL HERNIA    Specimen(s):  ID Type Source Tests Collected by Time Destination   1 :  Tissue Gallbladder TISSUE EXAM Eduard Salcido DO 2025  4:42 PM        Estimated Blood Loss:   Minimal    Drains:  * No LDAs found *    Anesthesia Type:   General    Operative Indications:  Cholelithiasis [K80.20]  Biliary colic [K80.50]      Operative Findings:  Supraumbilical ventral hernia encountered upon entry, size 2 x 1 cm, repaired with interrupted figure-of-eight PDS at completion of case  Significant inflammation and edema consistent with acute cholecystitis  After removal of the gallbladder from the gallbladder fossa we encountered what appeared to be significant coagulopathic oozing from a large portion of the liver surface with no clear large vessel injury.  Low platelets noted.  Hemostasis achieved with manual pressure with a Ray-Tarik, Surgicel, electrocautery, and finally Floseal.      Complications:   None    Procedure and Technique:  The patient was brought to the operating arena and placed in supine position. All regular monitoring devices were connected. The patient underwent general anesthesia with endotracheal intubation without complication. The patient received perioperative antibiotics. The patient received subcutaneous heparin in addition to bilateral lower extremity sequential compression devices for DVT prophylaxis. A timeout was performed prior to incision to ensure correct patient position, procedure, and site.    We made an incision in the  supraumbilical location in the vertical fashion.  Dissection was carried down through the skin and subcutaneous tissues.  At this point we did encounter a fat-containing ventral hernia.  See dimensions above.  This was dissected free from surrounding fascia and reduced into the abdomen.  At this point we used the fascial defect to introduce our 12 mm trocar.  Direct inspection showed no injuries to the underlying abdominal contents.  Pneumoperitoneum established without incident.  We placed our additional trocars in the subxiphoid and right lateral locations x 2.    Direct inspection of the gallbladder did reveal significant inflammation as well as surrounding edema.  We were able to dissect out the cystic duct and cystic artery which were triply clipped respectively and divided.  The gallbladder was then removed from the gallbladder fossa using electrocautery.  There was no spillage of bile during the case.  This was placed in an Endo Catch bag.    We then turned our attention to the gallbladder fossa.  There was greater than expected bleeding noted in this area.  We quickly placed a Ray-Tarik and held direct pressure.  Careful inspection showed multiple small vessels with some oozing however no large vessel bleeding.  Multiple maneuvers then utilized to achieve hemostasis.  We utilize electrocautery, Surgicel, and finally Floseal.  At completion the gallbladder fossa was hemostatic.    We suctioned any excess drainage and irrigated the right gutter as well as above the liver until the drainage was clear.    The trocars were removed under direct visualization.  The gallbladder was removed from the supraumbilical port site.  The supraumbilical hernia was closed with interrupted figure-of-eight PDS sutures.  Skin was closed with Monocryl, Steri-Strips, 4 x 4, and Tegaderm.    The patient tolerated procedure well was taken to the post anesthesia care unit in stable condition. All lap, needle, and instrument counts were  correct.    Dr. Salcido was present for the entire procedure    Patient Disposition:  PACU              SIGNATURE: Eduard Randolph MD  DATE: February 26, 2025  TIME: 9:13 PM

## 2025-02-27 NOTE — QUICK NOTE
MRI/MRCP performed earlier this morning with and without contrast:    IMPRESSION:  Fluid and gas in the gallbladder fossa, with differential as above. Given significant elevation in the total bilirubin, biliary leak is likely. To exclude an active biliary leak, consider HIDA scan if clinically warranted.  Hepatomegaly. Hepatic steatosis.  Significant splenomegaly. It seems out of proportion to hepatocellular disease. Correlate to exclude hematologic pathology.  Trace pleural fluid and bibasal atelectatic changes.    Discussed and reviewed with Dr. Salcido.    Patient personally updated regarding MRI findings, plan to proceed with HIDA scan later today.    Keep n.p.o. until HIDA scan completed.    Patient currently resting comfortably, pain under good control.   present with her in room.    Navarro Ventura PA-C

## 2025-02-27 NOTE — ASSESSMENT & PLAN NOTE
"Had outpatient CTA head and neck, noted 2.5 cm right lung mass  CT chest on current admission reveals a \"3 cm right upper lobe mass abutting the pleura with several pleural tags compatible with primary lung cancer.\"  Appreciate pulmonology input.  Plan for a PET scan, mass biopsy, PFTs likely outpatient to be scheduled on a short-term basis  "

## 2025-02-27 NOTE — ASSESSMENT & PLAN NOTE
I reviewed Ashley's CAT scan with her and her .  She does have a lung mass which is likely bronchogenic lung cancer.  If she was an outpatient I would schedule a PET scan PFTs and a lung biopsy.  I like to proceed with CT-guided lung biopsy as soon as possible PFTs and PET scan and likely presentation at tumor board.  I explained to her that she would likely be early stage and possible surgical resection  2/27/25- following today's discussion patient and  have opted for outpatient management. Will schedule PET-CT and PFTs and arrange outpatient biopsy based on Pet-Ct results.

## 2025-02-27 NOTE — ASSESSMENT & PLAN NOTE
Patient followed by Dr. Roberto Fratamico hematologist from [facility].  Platelet count here 108.

## 2025-02-27 NOTE — PROGRESS NOTES
Progress Note - Surgery-General   Name: Ashley Phillips 69 y.o. female I MRN: [id number]  Unit/Bed#: 404-01 I Date of Admission: 2/25/2025   Date of Service: 2/27/2025 I Hospital Day: 1    Assessment & Plan  Biliary colic  Ashley Phillips is a 69 y.o. female s/p 2/26 lap adrianne for cholecystitis    AVSS  T bili 6.4 from .92 pre op  Hgb 11.7 from 13.2    PLAN:   Will check MRCP this morning  NPO for now  Hypothyroidism  Continue p.o. levothyroxine, management by medicine service.  Thrombocytopenia (HCC)  Patient followed by Dr. Roberto Fratamico hematologist from [facility].  Platelet count here 108.  Hypertensive crisis  Managed by attending primary service.  BP on arrival 206/100.  IV hydralazine, continue as needed.  Continue to monitor blood pressure.  Lung mass  Discovered, confirmed on CT scan chest.  Right upper lobe mass 3 cm.  Current smoker, 3 cigarettes/day.  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  Denies hemoptysis,  notes occasional dry cough.  Recently transitioned from ACE inhibitor to ARB because of cough.  Patient claims weight unchanged.  Primary osteoarthritis of right hip  Patient with degenerative joint disease right hip, scheduled for right total hip arthroplasty on April 14 in [place] by Dr. Anurag Talamantes.          Subjective   No nausea or vomiting. Mild pain.     Objective :  Temp:  [97.5 °F (36.4 °C)-99.2 °F (37.3 °C)] 97.9 °F (36.6 °C)  HR:  [72-88] 75  BP: (111-144)/(58-82) 125/78  Resp:  [16-20] 18  SpO2:  [89 %-97 %] 93 %  O2 Device: Nasal cannula    I/O         02/25 0701  02/26 0700 02/26 0701  02/27 0700 02/27 0701  02/28 0700    I.V. (mL/kg)  1000 (16)     IV Piggyback  50     Total Intake(mL/kg)  1050 (16.8)     Net  +1050            Unmeasured Urine Occurrence  1 x             Physical Exam  Vitals and nursing note reviewed.   Constitutional:       General: She is not in acute distress.     Appearance: She is well-developed.   HENT:      Head:  Normocephalic and atraumatic.   Eyes:      Conjunctiva/sclera: Conjunctivae normal.   Cardiovascular:      Rate and Rhythm: Normal rate and regular rhythm.      Heart sounds: No murmur heard.  Pulmonary:      Effort: Pulmonary effort is normal. No respiratory distress.      Breath sounds: Normal breath sounds.   Abdominal:      Palpations: Abdomen is soft.      Tenderness: There is abdominal tenderness.      Comments: Appropriately tender   Musculoskeletal:         General: No swelling.      Cervical back: Neck supple.   Skin:     General: Skin is warm and dry.      Capillary Refill: Capillary refill takes less than 2 seconds.   Neurological:      Mental Status: She is alert.   Psychiatric:         Mood and Affect: Mood normal.           Lab Results: I have reviewed the following results:  Recent Labs     02/25/25 2028 02/25/25 2233 02/27/25  0504   WBC 5.90  --  8.68   HGB 13.2  --  11.7   HCT 39.8  --  34.9   *  --  95*   SODIUM 138  --  138   K 3.5  --  4.2     --  101   CO2 28  --  27   BUN 17  --  18   CREATININE 0.92  --  0.86   GLUC 173*  --  131   AST 31  --  257*   ALT 17  --  222*   ALB 5.1*  --  4.3   TBILI 0.92  --  6.41*   ALKPHOS 70  --  140*   HSTNI0 3  --   --    HSTNI2  --  3  --

## 2025-02-27 NOTE — ASSESSMENT & PLAN NOTE
Chronic thrombocytopenia   Evaluated by heme-onc in the outpatient setting and felt this is most likely ITP   Platelet count 108 on admission  SCDs for DVT prophylaxis  Noted for significant splenomegaly on imaging.  Will need to address this with heme-onc

## 2025-02-27 NOTE — PROGRESS NOTES
Progress Note - Pulmonology   Name: Ashley Phillips 69 y.o. female I MRN: [id number]  Unit/Bed#: 404-01 I Date of Admission: 2/25/2025   Date of Service: 2/27/2025 I Hospital Day: 1    Assessment & Plan  Biliary colic  Status post laparoscopic cholecystectomy 2/26/2025  General surgery following  Now with increased bilirubin, MRCP pending   Hypothyroidism  Continue oral hypothyroid  Thrombocytopenia (HCC)  Follows with heme-onc  Lung mass  I reviewed Ashley's CAT scan with her and her .  She does have a lung mass which is likely bronchogenic lung cancer.  If she was an outpatient I would schedule a PET scan PFTs and a lung biopsy.  I like to proceed with CT-guided lung biopsy as soon as possible PFTs and PET scan and likely presentation at tumor board.  I explained to her that she would likely be early stage and possible surgical resection  2/27/25- following today's discussion patient and  have opted for outpatient management. Will schedule PET-CT and PFTs and arrange outpatient biopsy based on Pet-Ct results.     Pulmonary will sign off at this time. Call with questions.    24 Hour Events : increased bilirubin and abdominal pain  Subjective : Patient was seen and examined today.  She is accompanied by her .  Patient denies respiratory complaints but states that she is feeling very overwhelmed by the entire situation.  She and her  agree that they would like to fix her gallbladder issue first then address her lung mass as outpatient.     Objective :  Temp:  [97.5 °F (36.4 °C)-99.2 °F (37.3 °C)] 97.9 °F (36.6 °C)  HR:  [72-88] 75  BP: (111-144)/(58-82) 125/78  Resp:  [16-20] 18  SpO2:  [89 %-97 %] 93 %  O2 Device: Nasal cannula    Physical Exam  Vitals reviewed.   Constitutional:       Appearance: Normal appearance. She is well-developed.   HENT:      Head: Normocephalic and atraumatic.   Eyes:      Extraocular Movements: Extraocular movements intact.   Pulmonary:      Effort: Pulmonary  effort is normal. No respiratory distress.   Skin:     General: Skin is warm and dry.   Neurological:      Mental Status: She is alert. Mental status is at baseline.   Psychiatric:         Mood and Affect: Mood normal.         Behavior: Behavior normal.           Lab Results: I have reviewed the following results:   .     02/27/25  0504   WBC 8.68   HGB 11.7   HCT 34.9   PLT 95*   SODIUM 138   K 4.2      CO2 27   BUN 18   CREATININE 0.86   GLUC 131   *   *   ALB 4.3   TBILI 6.41*   ALKPHOS 140*     ABG: No new results in last 24 hours.

## 2025-02-27 NOTE — PLAN OF CARE
Problem: PAIN - ADULT  Goal: Verbalizes/displays adequate comfort level or baseline comfort level  Description: Interventions:  - Encourage patient to monitor pain and request assistance  - Assess pain using appropriate pain scale  - Administer analgesics based on type and severity of pain and evaluate response  - Implement non-pharmacological measures as appropriate and evaluate response  - Consider cultural and social influences on pain and pain management  - Notify physician/advanced practitioner if interventions unsuccessful or patient reports new pain  Outcome: Progressing     Problem: INFECTION - ADULT  Goal: Absence or prevention of progression during hospitalization  Description: INTERVENTIONS:  - Assess and monitor for signs and symptoms of infection  - Monitor lab/diagnostic results  - Monitor all insertion sites, i.e. indwelling lines, tubes, and drains  - Monitor endotracheal if appropriate and nasal secretions for changes in amount and color  - [place] appropriate cooling/warming therapies per order  - Administer medications as ordered  - Instruct and encourage patient and family to use good hand hygiene technique  - Identify and instruct in appropriate isolation precautions for identified infection/condition  Outcome: Progressing     Problem: SAFETY ADULT  Goal: Patient will remain free of falls  Description: INTERVENTIONS:  - Educate patient/family on patient safety including physical limitations  - Instruct patient to call for assistance with activity   - Consult OT/PT to assist with strengthening/mobility   - Keep Call bell within reach  - Keep bed low and locked with side rails adjusted as appropriate  - Keep care items and personal belongings within reach  - Initiate and maintain comfort rounds  - Make Fall Risk Sign visible to staff  - Offer Toileting every 2 Hours, in advance of need  - Initiate/Maintain bed alarm  - Obtain necessary fall risk management equipment: nonskid socks  - Apply  yellow socks and bracelet for high fall risk patients  - Consider moving patient to room near nurses station  Outcome: Progressing     Problem: SAFETY ADULT  Goal: Maintain or return to baseline ADL function  Description: INTERVENTIONS:  -  Assess patient's ability to carry out ADLs; assess patient's baseline for ADL function and identify physical deficits which impact ability to perform ADLs (bathing, care of mouth/teeth, toileting, grooming, dressing, etc.)  - Assess/evaluate cause of self-care deficits   - Assess range of motion  - Assess patient's mobility; develop plan if impaired  - Assess patient's need for assistive devices and provide as appropriate  - Encourage maximum independence but intervene and supervise when necessary  - Involve family in performance of ADLs  - Assess for home care needs following discharge   - Consider OT consult to assist with ADL evaluation and planning for discharge  - Provide patient education as appropriate  Outcome: Progressing     Problem: SAFETY ADULT  Goal: Maintains/Returns to pre admission functional level  Description: INTERVENTIONS:  - Perform AM-PAC 6 Click Basic Mobility/ Daily Activity assessment daily.  - Set and communicate daily mobility goal to care team and patient/family/caregiver.   - Collaborate with rehabilitation services on mobility goals if consulted  - Perform Range of Motion 2 times a day.  - Reposition patient every 2 hours.  - Dangle patient 2 times a day  - Stand patient 2 times a day  - Ambulate patient 2 times a day  - Out of bed to chair 2 times a day   - Out of bed for meals 2 times a day  - Out of bed for toileting  - Record patient progress and toleration of activity level   Outcome: Progressing     Problem: DISCHARGE PLANNING  Goal: Discharge to home or other facility with appropriate resources  Description: INTERVENTIONS:  - Identify barriers to discharge w/patient and caregiver  - Arrange for needed discharge resources and transportation as  appropriate  - Identify discharge learning needs (meds, wound care, etc.)  - Arrange for interpretive services to assist at discharge as needed  - Refer to Case Management Department for coordinating discharge planning if the patient needs post-hospital services based on physician/advanced practitioner order or complex needs related to functional status, cognitive ability, or social support system  Outcome: Progressing     Problem: Knowledge Deficit  Goal: Patient/family/caregiver demonstrates understanding of disease process, treatment plan, medications, and discharge instructions  Description: Complete learning assessment and assess knowledge base.  Interventions:  - Provide teaching at level of understanding  - Provide teaching via preferred learning methods  Outcome: Progressing

## 2025-02-28 ENCOUNTER — APPOINTMENT (INPATIENT)
Dept: RADIOLOGY | Facility: HOSPITAL | Age: 70
DRG: 418 | End: 2025-02-28
Payer: MEDICARE

## 2025-02-28 ENCOUNTER — ANESTHESIA EVENT (INPATIENT)
Dept: GASTROENTEROLOGY | Facility: HOSPITAL | Age: 70
DRG: 418 | End: 2025-02-28
Payer: MEDICARE

## 2025-02-28 ENCOUNTER — TELEPHONE (OUTPATIENT)
Age: 70
End: 2025-02-28

## 2025-02-28 ENCOUNTER — ANESTHESIA (INPATIENT)
Dept: GASTROENTEROLOGY | Facility: HOSPITAL | Age: 70
DRG: 418 | End: 2025-02-28
Payer: MEDICARE

## 2025-02-28 ENCOUNTER — APPOINTMENT (INPATIENT)
Dept: GASTROENTEROLOGY | Facility: HOSPITAL | Age: 70
DRG: 418 | End: 2025-02-28
Payer: MEDICARE

## 2025-02-28 ENCOUNTER — PREP FOR PROCEDURE (OUTPATIENT)
Dept: INTERVENTIONAL RADIOLOGY/VASCULAR | Facility: CLINIC | Age: 70
End: 2025-02-28

## 2025-02-28 DIAGNOSIS — R91.8 LUNG MASS: Primary | ICD-10-CM

## 2025-02-28 LAB
ALBUMIN SERPL BCG-MCNC: 4 G/DL (ref 3.5–5)
ALP SERPL-CCNC: 141 U/L (ref 34–104)
ALT SERPL W P-5'-P-CCNC: 193 U/L (ref 7–52)
ANION GAP SERPL CALCULATED.3IONS-SCNC: 9 MMOL/L (ref 4–13)
AST SERPL W P-5'-P-CCNC: 191 U/L (ref 13–39)
BASOPHILS # BLD AUTO: 0.02 THOUSANDS/ÂΜL (ref 0–0.1)
BASOPHILS NFR BLD AUTO: 0 % (ref 0–1)
BILIRUB DIRECT SERPL-MCNC: 4.02 MG/DL (ref 0–0.2)
BILIRUB SERPL-MCNC: 6.93 MG/DL (ref 0.2–1)
BUN SERPL-MCNC: 13 MG/DL (ref 5–25)
CALCIUM SERPL-MCNC: 9.1 MG/DL (ref 8.4–10.2)
CHLORIDE SERPL-SCNC: 100 MMOL/L (ref 96–108)
CO2 SERPL-SCNC: 26 MMOL/L (ref 21–32)
CREAT SERPL-MCNC: 0.81 MG/DL (ref 0.6–1.3)
EOSINOPHIL # BLD AUTO: 0.03 THOUSAND/ÂΜL (ref 0–0.61)
EOSINOPHIL NFR BLD AUTO: 1 % (ref 0–6)
ERYTHROCYTE [DISTWIDTH] IN BLOOD BY AUTOMATED COUNT: 13.3 % (ref 11.6–15.1)
GFR SERPL CREATININE-BSD FRML MDRD: 73 ML/MIN/1.73SQ M
GLUCOSE SERPL-MCNC: 128 MG/DL (ref 65–140)
HCT VFR BLD AUTO: 34 % (ref 34.8–46.1)
HGB BLD-MCNC: 11 G/DL (ref 11.5–15.4)
IMM GRANULOCYTES # BLD AUTO: 0.04 THOUSAND/UL (ref 0–0.2)
IMM GRANULOCYTES NFR BLD AUTO: 1 % (ref 0–2)
LYMPHOCYTES # BLD AUTO: 1.17 THOUSANDS/ÂΜL (ref 0.6–4.47)
LYMPHOCYTES NFR BLD AUTO: 22 % (ref 14–44)
MCH RBC QN AUTO: 30.3 PG (ref 26.8–34.3)
MCHC RBC AUTO-ENTMCNC: 32.4 G/DL (ref 31.4–37.4)
MCV RBC AUTO: 94 FL (ref 82–98)
MONOCYTES # BLD AUTO: 0.41 THOUSAND/ÂΜL (ref 0.17–1.22)
MONOCYTES NFR BLD AUTO: 8 % (ref 4–12)
NEUTROPHILS # BLD AUTO: 3.75 THOUSANDS/ÂΜL (ref 1.85–7.62)
NEUTS SEG NFR BLD AUTO: 68 % (ref 43–75)
NRBC BLD AUTO-RTO: 0 /100 WBCS
PLATELET # BLD AUTO: 72 THOUSANDS/UL (ref 149–390)
PMV BLD AUTO: 11.1 FL (ref 8.9–12.7)
POTASSIUM SERPL-SCNC: 3.5 MMOL/L (ref 3.5–5.3)
PROT SERPL-MCNC: 6.4 G/DL (ref 6.4–8.4)
RBC # BLD AUTO: 3.63 MILLION/UL (ref 3.81–5.12)
SODIUM SERPL-SCNC: 135 MMOL/L (ref 135–147)
WBC # BLD AUTO: 5.42 THOUSAND/UL (ref 4.31–10.16)

## 2025-02-28 PROCEDURE — 80076 HEPATIC FUNCTION PANEL: CPT

## 2025-02-28 PROCEDURE — 0FJB8ZZ INSPECTION OF HEPATOBILIARY DUCT, VIA NATURAL OR ARTIFICIAL OPENING ENDOSCOPIC: ICD-10-PCS | Performed by: INTERNAL MEDICINE

## 2025-02-28 PROCEDURE — 99024 POSTOP FOLLOW-UP VISIT: CPT | Performed by: SURGERY

## 2025-02-28 PROCEDURE — 74328 X-RAY BILE DUCT ENDOSCOPY: CPT

## 2025-02-28 PROCEDURE — 99233 SBSQ HOSP IP/OBS HIGH 50: CPT | Performed by: FAMILY MEDICINE

## 2025-02-28 PROCEDURE — 80048 BASIC METABOLIC PNL TOTAL CA: CPT

## 2025-02-28 PROCEDURE — 85025 COMPLETE CBC W/AUTO DIFF WBC: CPT

## 2025-02-28 RX ORDER — FENTANYL CITRATE 50 UG/ML
INJECTION, SOLUTION INTRAMUSCULAR; INTRAVENOUS AS NEEDED
Status: DISCONTINUED | OUTPATIENT
Start: 2025-02-28 | End: 2025-02-28

## 2025-02-28 RX ORDER — SODIUM CHLORIDE, SODIUM LACTATE, POTASSIUM CHLORIDE, CALCIUM CHLORIDE 600; 310; 30; 20 MG/100ML; MG/100ML; MG/100ML; MG/100ML
125 INJECTION, SOLUTION INTRAVENOUS CONTINUOUS
Status: CANCELLED | OUTPATIENT
Start: 2025-02-28

## 2025-02-28 RX ORDER — SODIUM CHLORIDE, SODIUM LACTATE, POTASSIUM CHLORIDE, CALCIUM CHLORIDE 600; 310; 30; 20 MG/100ML; MG/100ML; MG/100ML; MG/100ML
INJECTION, SOLUTION INTRAVENOUS CONTINUOUS PRN
Status: DISCONTINUED | OUTPATIENT
Start: 2025-02-28 | End: 2025-02-28

## 2025-02-28 RX ORDER — EPHEDRINE SULFATE 50 MG/ML
INJECTION INTRAVENOUS AS NEEDED
Status: DISCONTINUED | OUTPATIENT
Start: 2025-02-28 | End: 2025-02-28

## 2025-02-28 RX ORDER — INDOMETHACIN 50 MG/1
SUPPOSITORY RECTAL AS NEEDED
Status: COMPLETED | OUTPATIENT
Start: 2025-02-28 | End: 2025-02-28

## 2025-02-28 RX ORDER — ONDANSETRON 2 MG/ML
INJECTION INTRAMUSCULAR; INTRAVENOUS AS NEEDED
Status: DISCONTINUED | OUTPATIENT
Start: 2025-02-28 | End: 2025-02-28

## 2025-02-28 RX ORDER — PROPOFOL 10 MG/ML
INJECTION, EMULSION INTRAVENOUS AS NEEDED
Status: DISCONTINUED | OUTPATIENT
Start: 2025-02-28 | End: 2025-02-28

## 2025-02-28 RX ORDER — ROCURONIUM BROMIDE 10 MG/ML
INJECTION, SOLUTION INTRAVENOUS AS NEEDED
Status: DISCONTINUED | OUTPATIENT
Start: 2025-02-28 | End: 2025-02-28

## 2025-02-28 RX ADMIN — FENTANYL CITRATE 50 MCG: 50 INJECTION INTRAMUSCULAR; INTRAVENOUS at 14:56

## 2025-02-28 RX ADMIN — SUGAMMADEX 200 MG: 100 INJECTION, SOLUTION INTRAVENOUS at 16:25

## 2025-02-28 RX ADMIN — EPHEDRINE SULFATE 10 MG: 50 INJECTION INTRAVENOUS at 16:04

## 2025-02-28 RX ADMIN — HEPARIN SODIUM 5000 UNITS: 5000 INJECTION, SOLUTION INTRAVENOUS; SUBCUTANEOUS at 05:26

## 2025-02-28 RX ADMIN — LEVOTHYROXINE SODIUM 100 MCG: 100 TABLET ORAL at 21:17

## 2025-02-28 RX ADMIN — ROCURONIUM 50 MG: 50 INJECTION, SOLUTION INTRAVENOUS at 14:41

## 2025-02-28 RX ADMIN — PROPOFOL 170 MG: 10 INJECTION, EMULSION INTRAVENOUS at 14:41

## 2025-02-28 RX ADMIN — OXYCODONE HYDROCHLORIDE 10 MG: 10 TABLET ORAL at 05:26

## 2025-02-28 RX ADMIN — INDOMETHACIN 100 MG: 50 SUPPOSITORY RECTAL at 14:58

## 2025-02-28 RX ADMIN — OXYCODONE HYDROCHLORIDE 10 MG: 10 TABLET ORAL at 19:58

## 2025-02-28 RX ADMIN — FENTANYL CITRATE 50 MCG: 50 INJECTION INTRAMUSCULAR; INTRAVENOUS at 14:41

## 2025-02-28 RX ADMIN — PANTOPRAZOLE SODIUM 40 MG: 40 INJECTION, POWDER, FOR SOLUTION INTRAVENOUS at 09:12

## 2025-02-28 RX ADMIN — IOHEXOL 6 ML: 300 INJECTION, SOLUTION INTRAVENOUS at 15:00

## 2025-02-28 RX ADMIN — SODIUM CHLORIDE, SODIUM LACTATE, POTASSIUM CHLORIDE, AND CALCIUM CHLORIDE: .6; .31; .03; .02 INJECTION, SOLUTION INTRAVENOUS at 14:36

## 2025-02-28 RX ADMIN — ONDANSETRON 4 MG: 2 INJECTION INTRAMUSCULAR; INTRAVENOUS at 14:41

## 2025-02-28 NOTE — ASSESSMENT & PLAN NOTE
Ashley Phillips is a 69 y.o. female s/p 2/26 lap adrianne for cholecystitis    Postoperative course complicated by hyperbilirubinemia.  Patient with chronic thrombocytopenia, platelet count today dropped to 72.  Hemoglobin stable.    Worsening total bilirubin today 6.93 (6.41 and 0.92 pre-op)  Direct bilirubin 4.02 (4.07 yesterday)  Alk phosphatase 141, AST//193 (257/222)    CBC normal white count 5.42 hemoglobin 11.0.  Platelets 72 (95 and 108)  BMP normal electrolytes, creatinine 0.81.  Glucose 128.    Vital signs stable, afebrile.    HIDA scan yesterday showed hepatic retention of radiotracer without evidence of biliary excretion, findings may related to biliary obstruction or parasellar dysfunction, evaluation for bile leak is therefore limited without biliary excretion.    MRI/MRCP:  Fluid and gas in the gallbladder fossa, with differential as above. Given significant elevation in the total bilirubin, biliary leak is likely. To exclude an active biliary leak, consider HIDA scan if clinically warranted.  Hepatomegaly. Hepatic steatosis.  Significant splenomegaly. It seems out of proportion to hepatocellular disease. Correlate to exclude hematologic pathology.  Trace pleural fluid and bibasal atelectatic changes    FH: The patient notes that her son was diagnosed with Gilbert's syndrome.  Of note her total bilirubin in January 2024 at that time was noted to be 1.23.     PLAN:   N.p.o.  Consult gastroenterology this morning, evaluate for hyperbilirubinemia and need for any intervention  IV fluids for hydration  Analgesics and antiemetics as ordered as needed  Management discussed via phone with Dr. Salcido.

## 2025-02-28 NOTE — ANESTHESIA PREPROCEDURE EVALUATION
Procedure:  ERCP    Relevant Problems   ANESTHESIA (within normal limits)      CARDIO   (+) Essential hypertension   (+) Hypertensive crisis      ENDO   (+) Hypothyroidism      HEMATOLOGY   (+) Autoimmune thrombocytopenia (HCC)   (+) Thrombocytopenia (HCC)      MUSCULOSKELETAL   (+) Chronic right-sided low back pain with right-sided sciatica   (+) Idiopathic gout involving toe of left foot   (+) Primary osteoarthritis of right hip      NEURO/PSYCH   (+) Chronic right-sided low back pain with right-sided sciatica   (+) Recurrent occipital headache      Other   (+) Elevated bilirubin   (+) Splenomegaly        Physical Exam    Airway    Mallampati score: I  TM Distance: >3 FB  Neck ROM: full     Dental    upper dentures    Cardiovascular  Cardiovascular exam normal    Pulmonary  Pulmonary exam normal     Other Findings  post-pubertal.      Anesthesia Plan  ASA Score- 3 Emergent    Anesthesia Type- general with ASA Monitors.         Additional Monitors:     Airway Plan: ETT.           Plan Factors-    Chart reviewed.    Patient summary reviewed.                  Induction- intravenous.    Postoperative Plan-     Perioperative Resuscitation Plan - Level 1 - Full Code.       Informed Consent- Anesthetic plan and risks discussed with patient.        NPO Status:  Vitals Value Taken Time   Date of last liquid 02/28/25 02/28/25 1248   Time of last liquid 0500 02/28/25 1248   Date of last solid 02/27/25 02/28/25 1248   Time of last solid 2100 02/28/25 1248          Oriented - self; Oriented - place; Oriented - time

## 2025-02-28 NOTE — ANESTHESIA POSTPROCEDURE EVALUATION
Post-Op Assessment Note    CV Status:  Stable    Pain management: adequate       Mental Status:  Alert and awake   Hydration Status:  Euvolemic   PONV Controlled:  Controlled   Airway Patency:  Patent     Post Op Vitals Reviewed: Yes    No anethesia notable event occurred.    Staff: Anesthesiologist           Last Filed PACU Vitals:  Vitals Value Taken Time   Temp     Pulse 80 02/28/25 1652   /79 02/28/25 1652   Resp 18 02/28/25 1652   SpO2 97 % 02/28/25 1652

## 2025-02-28 NOTE — PLAN OF CARE
Problem: PAIN - ADULT  Goal: Verbalizes/displays adequate comfort level or baseline comfort level  Description: Interventions:  - Encourage patient to monitor pain and request assistance  - Assess pain using appropriate pain scale  - Administer analgesics based on type and severity of pain and evaluate response  - Implement non-pharmacological measures as appropriate and evaluate response  - Consider cultural and social influences on pain and pain management  - Notify physician/advanced practitioner if interventions unsuccessful or patient reports new pain  Outcome: Progressing     Problem: SAFETY ADULT  Goal: Patient will remain free of falls  Description: INTERVENTIONS:  - Educate patient/family on patient safety including physical limitations  - Instruct patient to call for assistance with activity   - Consult OT/PT to assist with strengthening/mobility   - Keep Call bell within reach  - Keep bed low and locked with side rails adjusted as appropriate  - Keep care items and personal belongings within reach  - Initiate and maintain comfort rounds  - Make Fall Risk Sign visible to staff  - Offer Toileting every 2 Hours, in advance of need  - Initiate/Maintain bed/chair alarm  - Obtain necessary fall risk management equipment: non slip socks  - Apply yellow socks and bracelet for high fall risk patients  - Consider moving patient to room near nurses station  Outcome: Progressing  Goal: Maintain or return to baseline ADL function  Description: INTERVENTIONS:  -  Assess patient's ability to carry out ADLs; assess patient's baseline for ADL function and identify physical deficits which impact ability to perform ADLs (bathing, care of mouth/teeth, toileting, grooming, dressing, etc.)  - Assess/evaluate cause of self-care deficits   - Assess range of motion  - Assess patient's mobility; develop plan if impaired  - Assess patient's need for assistive devices and provide as appropriate  - Encourage maximum independence  but intervene and supervise when necessary  - Involve family in performance of ADLs  - Assess for home care needs following discharge   - Consider OT consult to assist with ADL evaluation and planning for discharge  - Provide patient education as appropriate  Outcome: Progressing  Goal: Maintains/Returns to pre admission functional level  Description: INTERVENTIONS:  - Perform AM-PAC 6 Click Basic Mobility/ Daily Activity assessment daily.  - Set and communicate daily mobility goal to care team and patient/family/caregiver.   - Collaborate with rehabilitation services on mobility goals if consulted  - Perform Range of Motion 3 times a day.  - Reposition patient every 2 hours.  - Dangle patient 3 times a day  - Stand patient 3 times a day  - Ambulate patient 3 times a day  - Out of bed to chair 3 times a day   - Out of bed for meals 3 times a day  - Out of bed for toileting  - Record patient progress and toleration of activity level   Outcome: Progressing

## 2025-02-28 NOTE — ASSESSMENT & PLAN NOTE
Noted postoperatively, most likely reactive  There was concern for a biliary leak, however reassuring results on both MRI/MRCP and HIDA scan  To bilirubin continuing to uptrend, the plan is now for the patient to be transferred to [facility] for ERCP severe pulmonary process

## 2025-02-28 NOTE — ASSESSMENT & PLAN NOTE
Patient followed by Dr. Roberto Fratamico hematologist from [facility].  Platelet count here 108.

## 2025-02-28 NOTE — ASSESSMENT & PLAN NOTE
Chronic thrombocytopenia   Evaluated by heme-onc in the outpatient setting and felt this is most likely ITP   Platelet count 108 on admission, decreased to 72  SCDs for DVT prophylaxis, d/c heparin  Noted for significant splenomegaly on imaging.  Will need to address this with heme-onc

## 2025-02-28 NOTE — ASSESSMENT & PLAN NOTE
Presented from home after eating a cheeseburger and fries for evening meal with right sided upper quadrant abdominal pain radiating to back with nausea  Abdominal ultrasound with cholelithiasis. No gallbladder wall thickening or pericholecystic fluid   No fever or leukocytosis  The patient underwent a laparoscopic cholecystectomy 2/26/2025 with perioperative findings consistent with acute cholecystitis  Was noted for an elevated bilirubin level for which she underwent additional imaging including MRI/MRCP and HIDA scan.  Fortunately no evidence of obstruction or biliary leak on imaging.  Today, 2/28/25 bilirubin continuing to trend up. Plan for ERCP today to evaluate biliary ducts, transfer via boomerang to [facility]  Check LFTs in a.m.

## 2025-02-28 NOTE — ASSESSMENT & PLAN NOTE
Noted, with thrombocytopenia.   No evidence of advanced liver disease, though it appears she does have some degree of hepatic steatosis.   Recommend hematology evaluation at some point in future.

## 2025-02-28 NOTE — PROGRESS NOTES
Progress Note - Surgery-General   Name: Ashley Phillips 69 y.o. female I MRN: [id number]  Unit/Bed#: 404-01 I Date of Admission: 2/25/2025   Date of Service: 2/28/2025 I Hospital Day: 2     Assessment & Plan  Biliary colic  Ashley Phillips is a 69 y.o. female s/p 2/26 lap adrianne for cholecystitis    Postoperative course complicated by hyperbilirubinemia.  Patient with chronic thrombocytopenia, platelet count today dropped to 72.  Hemoglobin stable.    Worsening total bilirubin today 6.93 (6.41 and 0.92 pre-op)  Direct bilirubin 4.02 (4.07 yesterday)  Alk phosphatase 141, AST//193 (257/222)    CBC normal white count 5.42 hemoglobin 11.0.  Platelets 72 (95 and 108)  BMP normal electrolytes, creatinine 0.81.  Glucose 128.    Vital signs stable, afebrile.    HIDA scan yesterday showed hepatic retention of radiotracer without evidence of biliary excretion, findings may related to biliary obstruction or parasellar dysfunction, evaluation for bile leak is therefore limited without biliary excretion.    MRI/MRCP:  Fluid and gas in the gallbladder fossa, with differential as above. Given significant elevation in the total bilirubin, biliary leak is likely. To exclude an active biliary leak, consider HIDA scan if clinically warranted.  Hepatomegaly. Hepatic steatosis.  Significant splenomegaly. It seems out of proportion to hepatocellular disease. Correlate to exclude hematologic pathology.  Trace pleural fluid and bibasal atelectatic changes    FH: The patient notes that her son was diagnosed with Gilbert's syndrome.  Of note her total bilirubin in January 2024 at that time was noted to be 1.23.     PLAN:   N.p.o.  Consult gastroenterology this morning, evaluate for hyperbilirubinemia and need for any intervention  IV fluids for hydration  Analgesics and antiemetics as ordered as needed  Management discussed via phone with Dr. Salcido.  Hypothyroidism  Continue p.o. levothyroxine, management by medicine  service.  Thrombocytopenia (HCC)  Patient followed by Dr. Roberto Fratamico hematologist from [facility].  Platelet count here 108.  Hypertensive crisis  Managed by attending primary service.  BP on arrival 206/100.  IV hydralazine, continue as needed.  Continue to monitor blood pressure.  Lung mass  Discovered, confirmed on CT scan chest.  Right upper lobe mass 3 cm.  Current smoker, 3 cigarettes/day.  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  Denies hemoptysis,  notes occasional dry cough.  Recently transitioned from ACE inhibitor to ARB because of cough.  Patient claims weight unchanged.  Primary osteoarthritis of right hip  Patient with degenerative joint disease right hip, scheduled for right total hip arthroplasty on April 14 in [place] by Dr. Anurag Talamantes.    Splenomegaly    Elevated bilirubin      Surgery-General service will follow.    Mikaela Ramirez is now 2 days postop status post laparoscopic cholecystectomy and repair of supraumbilical ventral hernia, uneventful with minimal blood loss.  Incisional pain.  No nausea vomiting.  Worsening overnight total bilirubin.  Development of scleral icterus.  Passing flatus.  No fever or chills.  Tolerated regular diet last night, appetite not much.  Discussed with Dr. Salcido, will consult gastroenterology for recommendations.  The patient notes that her son was previously diagnosed with Gilbert's syndrome.  She has had no problem in the past with jaundiced or similar problems, of note her total bilirubin in January 2024 was elevated then.    Scheduled Meds:  Current Facility-Administered Medications   Medication Dose Route Frequency Provider Last Rate    acetaminophen  650 mg Oral Q6H PRN Eduard Salcido, DO      heparin (porcine)  5,000 Units Subcutaneous Q8H ROBBIE Salcido, DO      hydrALAZINE  5 mg Intravenous Q6H PRN Eduard Salcido, DO      HYDROmorphone  0.5 mg Intravenous Q3H PRN Eduard Salcido, DO      levothyroxine  100 mcg  Oral HS David Lennon MD      multi-electrolyte  100 mL/hr Intravenous Continuous Eduard Salcido,  100 mL/hr (02/26/25 2204)    ondansetron  4 mg Intravenous Q8H PRN Eduard Salcido, DO      oxyCODONE  10 mg Oral Q6H PRN Eduard Salcido, DO      pantoprazole  40 mg Intravenous Q24H ROBBIE Eduard Salcido,        Continuous Infusions:multi-electrolyte, 100 mL/hr, Last Rate: 100 mL/hr (02/26/25 2204)      PRN Meds:.  acetaminophen    hydrALAZINE    HYDROmorphone    ondansetron    oxyCODONE        Objective :  Temp:  [97.9 °F (36.6 °C)] 97.9 °F (36.6 °C)  HR:  [84-96] 84  BP: (139-147)/(71-88) 139/88  Resp:  [16] 16  SpO2:  [91 %-94 %] 94 %    I/O         02/26 0701  02/27 0700 02/27 0701  02/28 0700 02/28 0701  03/01 0700    P.O.  0     I.V. (mL/kg) 1000 (16)      IV Piggyback 50      Total Intake(mL/kg) 1050 (16.8) 0 (0)     Net +1050 0            Unmeasured Urine Occurrence 1 x              Physical Exam  Vitals reviewed.   Constitutional:       General: She is not in acute distress.     Appearance: She is not ill-appearing or toxic-appearing.   HENT:      Head: Normocephalic.      Nose: Nose normal.      Mouth/Throat:      Mouth: Mucous membranes are moist.   Eyes:      Comments: Light yellow icterus noted OU.   Cardiovascular:      Rate and Rhythm: Normal rate and regular rhythm.      Heart sounds: Normal heart sounds. No murmur heard.  Pulmonary:      Effort: No respiratory distress.      Breath sounds: No wheezing or rales.   Abdominal:      General: Bowel sounds are normal. There is no distension.      Palpations: There is no mass.      Tenderness: There is abdominal tenderness. There is no guarding or rebound.      Hernia: No hernia is present.      Comments: Laparoscopic incisions CDI.  Mild tender to touch right upper quadrant.  Positive bowel sounds heard.  No masses are felt.  No guarding or rebound.   Musculoskeletal:      Cervical back: Normal range of motion and neck supple.      Right lower leg: No edema.       Left lower leg: No edema.   Skin:     Coloration: Skin is jaundiced. Skin is not pale.      Findings: No bruising, erythema, lesion or rash.   Neurological:      Mental Status: She is alert. Mental status is at baseline.      Motor: No weakness.   Psychiatric:         Mood and Affect: Mood normal.         Thought Content: Thought content normal.           Lab Results: I have reviewed the following results:  Recent Labs     02/25/25 2028 02/25/25  2233 02/27/25  0504 02/28/25  0445   WBC 5.90  --    < > 5.42   HGB 13.2  --    < > 11.0*   HCT 39.8  --    < > 34.0*   *  --    < > 72*   SODIUM 138  --    < > 135   K 3.5  --    < > 3.5     --    < > 100   CO2 28  --    < > 26   BUN 17  --    < > 13   CREATININE 0.92  --    < > 0.81   GLUC 173*  --    < > 128   AST 31  --    < > 191*   ALT 17  --    < > 193*   ALB 5.1*  --    < > 4.0   TBILI 0.92  --    < > 6.93*   ALKPHOS 70  --    < > 141*   HSTNI0 3  --   --   --    HSTNI2  --  3  --   --     < > = values in this interval not displayed.       Imaging Results Review: I reviewed radiology reports from this admission including: MRI/MRCP and HIDA scan.    MRI OF THE ABDOMEN WITHOUT CONTRAST WITH MRCP 2/27/2025     INDICATION: 69 years / Female. rule out possible bile leak, possible choledocholithiasis.. Status post laparoscopic cholecystectomy. Elevated bilirubin.     COMPARISON: Right upper quadrant ultrasound, 2/25/2025     TECHNIQUE: Multiplanar/multisequence MRI of the abdomen with 3D MRCP was performed without the administration of contrast.     FINDINGS:     LOWER CHEST: Discoid subsegmental atelectases bibasally, right greater than left. Trace pleural fluid.     LIVER:  Hepatomegaly, 20.7 cm craniocaudad. Hepatic steatosis.  No suspicious mass.  Limited evaluation of hepatic and portal veins on this non-contrast MRI is unremarkable.     BILE DUCTS: No intrahepatic or extrahepatic bile duct dilation. Common bile duct is normal in caliber. No  choledocholithiasis, biliary stricture or suspicious mass.     GALLBLADDER: Surgically absent. In the gallbladder fossa, there is a fluid collection without a well-defined wall, 5.2 x 2.9 x 5.1 cm, with susceptibility foci in the nondependent aspect indicative of air. The collection may reflect postsurgical fluid, bile leak, forming abscess, seroma.     PANCREAS: Unremarkable.     ADRENAL GLANDS: Unremarkable.     SPLEEN: Splenomegaly, 20 cm craniocaudad. No focal lesions.     KIDNEYS/PROXIMAL URETERS: No hydroureteronephrosis. No suspicious renal mass. Simple small left renal cysts.     BOWEL: No dilated loops of bowel.     PERITONEUM/RETROPERITONEUM: No ascites.     LYMPH NODES: Periportal lymphadenopathy, the largest lymph node 19 mm transverse.     VESSELS: No aneurysm.     ABDOMINAL WALL: Unremarkable.     BONES: No suspicious osseous lesion.     IMPRESSION:  Fluid and gas in the gallbladder fossa, with differential as above. Given significant elevation in the total bilirubin, biliary leak is likely. To exclude an active biliary leak, consider HIDA scan if clinically warranted.  Hepatomegaly. Hepatic steatosis.  Significant splenomegaly. It seems out of proportion to hepatocellular disease. Correlate to exclude hematologic pathology.  Trace pleural fluid and bibasal atelectatic changes.      HEPATOBILIARY SCAN 2/27/2025     INDICATION: Abnormal MRI. Elevated bilirubin. Evaluate for biliary leak.     COMPARISON: MRI abdomen 2/27/2025     TECHNIQUE:   Following the intravenous administration of 5.2 mCi Tc-99m labeled mebrofenin, dynamic abdominal imaging was obtained in the anterior projection over a 60 minute time period. Additional delayed images acquired for up to 2 hours.     FINDINGS:     Prompt radiotracer uptake noted in the liver. There is delayed blood pool clearance. No significant radioactive biliary concentration noted within the first hour or on the 2-hour delayed images.     Bladder activity noted,  "a normal alternate mode of excretion. Mild splenic activity is noted.     IMPRESSION:     1. Hepatic retention of radiotracer without evidence of biliary excretion. Findings may be related to biliary obstruction or hepatocellular dysfunction. Evaluation for bile leak is therefore limited without biliary excretion.     Other Study Results Review: No additional pertinent studies reviewed.    VTE Pharmacologic Prophylaxis: Heparin  VTE Mechanical Prophylaxis: sequential compression device    Navarro Ventura PA-C    **Please Note: Portions of the record may have been created using voice recognition software.  Occasional wrong word or \"sound a like\" substitutions may have occurred due to the inherent limitations of voice recognition software.  Read the chart carefully and recognize, using context, where substitutions have occurred.**      "

## 2025-02-28 NOTE — ASSESSMENT & PLAN NOTE
Patient with degenerative joint disease right hip, scheduled for right total hip arthroplasty on April 14 in [place] by Dr. Anurag Talamantes.

## 2025-02-28 NOTE — ANESTHESIA POSTPROCEDURE EVALUATION
Post-Op Assessment Note    CV Status:  Stable  Pain Score: 0    Pain management: adequate       Mental Status:  Sleepy and arousable   Hydration Status:  Stable and euvolemic   PONV Controlled:  None   Airway Patency:  Patent and adequate     Post Op Vitals Reviewed: Yes    No anethesia notable event occurred.    Staff: CRNA           Last Filed PACU Vitals:  Vitals Value Taken Time   Temp 98.3 °F (36.8 °C) 02/28/25 1638   Pulse 80 02/28/25 1638   /81 02/28/25 1638   Resp 20 02/28/25 1638   SpO2 100 % 02/28/25 1638

## 2025-02-28 NOTE — PROGRESS NOTES
"Progress Note - Hospitalist   Name: Ashley Phillips 69 y.o. female I MRN: [id number]  Unit/Bed#: 404-01 I Date of Admission: 2/25/2025   Date of Service: 2/28/2025 I Hospital Day: 2    Assessment & Plan  Biliary colic  Presented from home after eating a cheeseburger and fries for evening meal with right sided upper quadrant abdominal pain radiating to back with nausea  Abdominal ultrasound with cholelithiasis. No gallbladder wall thickening or pericholecystic fluid   No fever or leukocytosis  The patient underwent a laparoscopic cholecystectomy 2/26/2025 with perioperative findings consistent with acute cholecystitis  Was noted for an elevated bilirubin level for which she underwent additional imaging including MRI/MRCP and HIDA scan.  Fortunately no evidence of obstruction or biliary leak on imaging.  Today, 2/28/25 bilirubin continuing to trend up. Plan for ERCP today to evaluate biliary ducts, transfer via boomerang to [facility]  Check LFTs in a.m.  Hypertensive crisis  Recently transitioned from ACE inhibitor to ARB due to cough  Continue PTA regimen amlodipine 2.5 mg daily, atenolol 50 mg twice daily, ARB  /100 on ED arrival  Improved with IV hydralazine, will continue PRN  Blood pressure improved and was likely elevated this is a reactive process in setting of acute pain and inflammation  Lung mass  Had outpatient CTA head and neck, noted 2.5 cm right lung mass  CT chest on current admission reveals a \"3 cm right upper lobe mass abutting the pleura with several pleural tags compatible with primary lung cancer.\"  Appreciate pulmonology input.  Plan for a PET scan, mass biopsy, PFTs likely outpatient to be scheduled on a short-term basis  Hypothyroidism  Continue levothyroxine   Thrombocytopenia (HCC)  Chronic thrombocytopenia   Evaluated by heme-onc in the outpatient setting and felt this is most likely ITP   Platelet count 108 on admission, decreased to 72  SCDs for DVT prophylaxis, d/c heparin  Noted for " significant splenomegaly on imaging.  Will need to address this with heme-onc  Splenomegaly  Noted on imaging  To address with heme-onc outpatient  Primary osteoarthritis of right hip  Scheduled for hip replacement in near future  Elevated bilirubin  Noted postoperatively, most likely reactive  There was concern for a biliary leak, however reassuring results on both MRI/MRCP and HIDA scan  To bilirubin continuing to uptrend, the plan is now for the patient to be transferred to [facility] for ERCP severe pulmonary process    VTE Pharmacologic Prophylaxis:    on hold due to thrombocytopenia    Mobility:   Basic Mobility Inpatient Raw Score: 24  JH-HLM Goal: 8: Walk 250 feet or more  JH-HLM Achieved: 8: Walk 250 feet ot more      Patient Centered Rounds: I performed bedside rounds with nursing staff today.   Discussions with Specialists or Other Care Team Provider: GI, Gen surgery    Education and Discussions with Family / Patient: Updated  () at bedside.    Current Length of Stay: 2 day(s)  Current Patient Status: Inpatient   Certification Statement: The patient will continue to require additional inpatient hospital stay due to need for procedure, close monitoring  Discharge Plan: Anticipate discharge tomorrow to home.    Code Status: Level 1 - Full Code    Subjective   Patient reports some abdominal discomfort following cholecystectomy but overall feels improved.    Objective :  Temp:  [97.4 °F (36.3 °C)-97.9 °F (36.6 °C)] 97.4 °F (36.3 °C)  HR:  [74-96] 74  BP: (139-173)/(71-90) 173/90  Resp:  [16] 16  SpO2:  [91 %-99 %] 99 %  O2 Device: None (Room air)    Body mass index is 24.45 kg/m².     Input and Output Summary (last 24 hours):     Intake/Output Summary (Last 24 hours) at 2/28/2025 1308  Last data filed at 2/28/2025 0900  Gross per 24 hour   Intake 0 ml   Output --   Net 0 ml       Physical Exam  Constitutional:       General: She is not in acute distress.  HENT:      Head:  Normocephalic and atraumatic.   Eyes:      Conjunctiva/sclera: Conjunctivae normal.   Cardiovascular:      Rate and Rhythm: Normal rate and regular rhythm.   Pulmonary:      Effort: No respiratory distress.      Breath sounds: No wheezing or rales.   Abdominal:      Tenderness: There is abdominal tenderness.   Musculoskeletal:      Right lower leg: No edema.      Left lower leg: No edema.   Skin:     General: Skin is warm and dry.   Neurological:      Mental Status: She is oriented to person, place, and time.   Psychiatric:         Mood and Affect: Mood normal.           Lines/Drains:              Lab Results: I have reviewed the following results:   Results from last 7 days   Lab Units 02/28/25  0445   WBC Thousand/uL 5.42   HEMOGLOBIN g/dL 11.0*   HEMATOCRIT % 34.0*   PLATELETS Thousands/uL 72*   SEGS PCT % 68   LYMPHO PCT % 22   MONO PCT % 8   EOS PCT % 1     Results from last 7 days   Lab Units 02/28/25  0445   SODIUM mmol/L 135   POTASSIUM mmol/L 3.5   CHLORIDE mmol/L 100   CO2 mmol/L 26   BUN mg/dL 13   CREATININE mg/dL 0.81   ANION GAP mmol/L 9   CALCIUM mg/dL 9.1   ALBUMIN g/dL 4.0   TOTAL BILIRUBIN mg/dL 6.93*   ALK PHOS U/L 141*   ALT U/L 193*   AST U/L 191*   GLUCOSE RANDOM mg/dL 128                       Recent Cultures (last 7 days):               Last 24 Hours Medication List:     Current Facility-Administered Medications:     acetaminophen (TYLENOL) tablet 650 mg, Q6H PRN    hydrALAZINE (APRESOLINE) injection 5 mg, Q6H PRN    HYDROmorphone (DILAUDID) injection 0.5 mg, Q3H PRN    levothyroxine tablet 100 mcg, HS    multi-electrolyte (PLASMALYTE-A/ISOLYTE-S PH 7.4) IV solution, Continuous, Last Rate: 100 mL/hr (02/26/25 2204)    ondansetron (ZOFRAN) injection 4 mg, Q8H PRN    oxyCODONE (ROXICODONE) immediate release tablet 10 mg, Q6H PRN    pantoprazole (PROTONIX) injection 40 mg, Q24H ROBBIE    Administrative Statements   Today, Patient Was Seen By: Adelina Peña MD  I have spent a total time of  54 minutes in caring for this patient on the day of the visit/encounter including Diagnostic results, Documenting in the medical record, Reviewing/placing orders in the medical record (including tests, medications, and/or procedures), and Communicating with other healthcare professionals .    **Please Note: This note may have been constructed using a voice recognition system.**

## 2025-02-28 NOTE — PLAN OF CARE
Problem: PAIN - ADULT  Goal: Verbalizes/displays adequate comfort level or baseline comfort level  Description: Interventions:  - Encourage patient to monitor pain and request assistance  - Assess pain using appropriate pain scale  - Administer analgesics based on type and severity of pain and evaluate response  - Implement non-pharmacological measures as appropriate and evaluate response  - Consider cultural and social influences on pain and pain management  - Notify physician/advanced practitioner if interventions unsuccessful or patient reports new pain  Outcome: Progressing     Problem: INFECTION - ADULT  Goal: Absence or prevention of progression during hospitalization  Description: INTERVENTIONS:  - Assess and monitor for signs and symptoms of infection  - Monitor lab/diagnostic results  - Monitor all insertion sites, i.e. indwelling lines, tubes, and drains  - Monitor endotracheal if appropriate and nasal secretions for changes in amount and color  - [place] appropriate cooling/warming therapies per order  - Administer medications as ordered  - Instruct and encourage patient and family to use good hand hygiene technique  - Identify and instruct in appropriate isolation precautions for identified infection/condition  Outcome: Progressing     Problem: SAFETY ADULT  Goal: Patient will remain free of falls  Description: INTERVENTIONS:  - Educate patient/family on patient safety including physical limitations  - Instruct patient to call for assistance with activity   - Consult OT/PT to assist with strengthening/mobility   - Keep Call bell within reach  - Keep bed low and locked with side rails adjusted as appropriate  - Keep care items and personal belongings within reach  - Initiate and maintain comfort rounds  - Make Fall Risk Sign visible to staff  - Offer Toileting every 2 Hours, in advance of need  - Initiate/Maintain bed/chair alarm  - Obtain necessary fall risk management equipment: non slip socks  -  Apply yellow socks and bracelet for high fall risk patients  - Consider moving patient to room near nurses station  Outcome: Progressing  Goal: Maintain or return to baseline ADL function  Description: INTERVENTIONS:  -  Assess patient's ability to carry out ADLs; assess patient's baseline for ADL function and identify physical deficits which impact ability to perform ADLs (bathing, care of mouth/teeth, toileting, grooming, dressing, etc.)  - Assess/evaluate cause of self-care deficits   - Assess range of motion  - Assess patient's mobility; develop plan if impaired  - Assess patient's need for assistive devices and provide as appropriate  - Encourage maximum independence but intervene and supervise when necessary  - Involve family in performance of ADLs  - Assess for home care needs following discharge   - Consider OT consult to assist with ADL evaluation and planning for discharge  - Provide patient education as appropriate  Outcome: Progressing  Goal: Maintains/Returns to pre admission functional level  Description: INTERVENTIONS:  - Perform AM-PAC 6 Click Basic Mobility/ Daily Activity assessment daily.  - Set and communicate daily mobility goal to care team and patient/family/caregiver.   - Collaborate with rehabilitation services on mobility goals if consulted  - Perform Range of Motion 3 times a day.  - Reposition patient every 2 hours.  - Dangle patient 3 times a day  - Stand patient 3 times a day  - Ambulate patient 3 times a day  - Out of bed to chair 3 times a day   - Out of bed for meals 3 times a day  - Out of bed for toileting  - Record patient progress and toleration of activity level   Outcome: Progressing     Problem: DISCHARGE PLANNING  Goal: Discharge to home or other facility with appropriate resources  Description: INTERVENTIONS:  - Identify barriers to discharge w/patient and caregiver  - Arrange for needed discharge resources and transportation as appropriate  - Identify discharge learning  needs (meds, wound care, etc.)  - Arrange for interpretive services to assist at discharge as needed  - Refer to Case Management Department for coordinating discharge planning if the patient needs post-hospital services based on physician/advanced practitioner order or complex needs related to functional status, cognitive ability, or social support system  Outcome: Progressing     Problem: Knowledge Deficit  Goal: Patient/family/caregiver demonstrates understanding of disease process, treatment plan, medications, and discharge instructions  Description: Complete learning assessment and assess knowledge base.  Interventions:  - Provide teaching at level of understanding  - Provide teaching via preferred learning methods  Outcome: Progressing

## 2025-02-28 NOTE — CONSULTS
Consultation - Gastroenterology   Name: Ashley Phillips 69 y.o. female I MRN: [id number]  Unit/Bed#: 404-01 I Date of Admission: 2/25/2025   Date of Service: 2/28/2025 I Hospital Day: 2   Inpatient consult to gastroenterology  Consult performed by: Cate Wright PA-C  Consult ordered by: Navarro Ventura PA-C        Physician Requesting Evaluation: Adelina Peña MD   Reason for Evaluation / Principal Problem: elevated LFTs    Assessment & Plan  Elevated bilirubin  Pt underwent laparoscopic cholecystectomy on 2/25/2025.  Transaminases elevated postoperatively including a rising bilirubin.  MRI/MRCP with fluid and gas in the gallbladder fossa.  HIDA inconclusive.  Some concern for bile leak versus postoperative change.  After discussion with attending physician and advanced endoscopy Mary Kate Gautam, plan for boomerang ERCP procedure.  Maintain NPO.  Monitor serial abdominal examinations and trend LFTs.    Pertaining to the fluid collection, Dr. Mary Kate Gautam did recommend IR consultation for drainage.  I reached out to SHERLYN Gautam of IR (listed for [employer] coverage).  No in person physician coverage for IR at [facility] today.  He felt the collection was small and may indicate postoperative fluid.  He does not recommend a drain at this time.  He recommends proceeding with ERCP at some point in future checking CT scan.  Biliary colic  Pt underwent lap cholecystectomy on 02/25/25.   Continue to follow with General Surgery.  Splenomegaly  Noted, with thrombocytopenia.   No evidence of advanced liver disease, though it appears she does have some degree of hepatic steatosis.   Recommend hematology evaluation at some point in future.   Thrombocytopenia (HCC)  As above, no sig advanced liver disease based upon imaging studies.   Defer to hematology.   Lung mass  Pt was evaluated by Pulmonary.   PET CT has been ordered.   I have discussed the above management plan in detail with the primary service.     History  of Present Illness   HPI:  Ashley Phillips is a 69 y.o. female who presented to ER on 2/26/2025 with abdominal pain.  She was eating a cheeseburger when she developed right upper quadrant abdominal pain radiating to the back.  She had an abdominal ultrasound which demonstrated cholelithiasis.  She also had a CTA of the head and neck which commented on a right lung mass.     Her serologies at the time of admission demonstrated Hb 13.2, MCV 94, platelets 108, BUN 17, creatinine 0.92, T. bili 0.92, AST 31, ALT 17, albumin 5.1, ALP 70.  General surgery was consulted and the patient underwent a laparoscopic cholecystectomy on 2/28/2025, as well as a repair of a supraumbilical hernia.  Op notes indicate potential coagulopathic oozing from a portion of the liver surface following procedure.    Serologies postoperatively indicated Hb 11.0, MCV 94, platelets 72, T. bili 6.93, direct bili 4.02, , , albumin 4.0, .  She remains afebrile.  She had an MRI completed of the abdomen which demonstrated gas in the gallbladder fossa, hepatomegaly and hepatic steatosis, as well as splenomegaly.  She had a HIDA scan completed which was a limited study and demonstrated hepatic retention of radiotracer without evidence of biliary excretion.    Endoscopic history:   Colon: 09/2023: Hyperplastic polyp    Review of Systems  Per HPI    Historical Information   Past Medical History:   Diagnosis Date    Chronic ITP (idiopathic thrombocytopenia) (HCC)     Disease of thyroid gland     Hypertension      Past Surgical History:   Procedure Laterality Date    COLONOSCOPY  10/26/2017    Dr. Guevara - polyp in sigmoid colon removed; moderate diverticulosis in sigmoid colon. Bx: tubular adenoma.    COLONOSCOPY  08/03/2012    Dr. Guevara - polyp found in sigmoid colon removed. Moderately severe diverticulosis in the sigmoid colon. Bx: tubular adenoma.    HYSTERECTOMY      THYROIDECTOMY, PARTIAL Left 2004    papillary  carcinoma of the thyroid    TOTAL THYROIDECTOMY Bilateral 2009    Hashimotos     Social History     Tobacco Use    Smoking status: Former     Current packs/day: 0.00     Types: Cigarettes     Quit date:      Years since quittin.1    Smokeless tobacco: Never   Vaping Use    Vaping status: Never Used   Substance and Sexual Activity    Alcohol use: Never    Drug use: Never    Sexual activity: Not Currently     E-Cigarette/Vaping    E-Cigarette Use Never User      E-Cigarette/Vaping Substances    Nicotine No     THC No     CBD No     Flavoring No     Other No     Unknown No      Family History   Problem Relation Age of Onset    Leukemia Mother         chronic lymphocytic leukemia    Hashimoto's thyroiditis Mother     Diabetes type II Mother     Lung cancer Father     No Known Problems Daughter     No Known Problems Maternal Grandmother     No Known Problems Maternal Grandfather     No Known Problems Paternal Grandmother     No Known Problems Paternal Grandfather     No Known Problems Brother     No Known Problems Son     No Known Problems Maternal Aunt     No Known Problems Paternal Aunt     Breast cancer Cousin        Objective :  Temp:  [97.9 °F (36.6 °C)] 97.9 °F (36.6 °C)  HR:  [84-96] 84  BP: (139-147)/(71-88) 139/88  Resp:  [16] 16  SpO2:  [91 %-94 %] 94 %    Physical Exam  Vitals and nursing note reviewed.   Constitutional:       General: She is not in acute distress.     Appearance: She is well-developed.   HENT:      Head: Normocephalic and atraumatic.   Eyes:      General: Scleral icterus present.      Conjunctiva/sclera: Conjunctivae normal.   Cardiovascular:      Rate and Rhythm: Normal rate.      Heart sounds: No murmur heard.  Pulmonary:      Effort: Pulmonary effort is normal. No respiratory distress.      Breath sounds: Normal breath sounds.   Abdominal:      General: Bowel sounds are normal. There is no distension.      Palpations: Abdomen is soft.      Tenderness: There is abdominal  tenderness. There is no guarding or rebound.   Musculoskeletal:         General: No swelling.   Skin:     General: Skin is warm and dry.      Coloration: Skin is jaundiced.   Neurological:      General: No focal deficit present.      Mental Status: She is alert.   Psychiatric:         Mood and Affect: Mood normal.         Behavior: Behavior normal.         Lab Results: I have reviewed the following results:CBC/BMP:   .     02/28/25  0445   WBC 5.42   HGB 11.0*   HCT 34.0*   PLT 72*   SODIUM 135   K 3.5      CO2 26   BUN 13   CREATININE 0.81   GLUC 128    , Creatinine Clearance: Estimated Creatinine Clearance: 53.5 mL/min (by C-G formula based on SCr of 0.81 mg/dL)., LFTs:   .     02/28/25  0445   *   *   ALB 4.0   TBILI 6.93*   ALKPHOS 141*    , PTT/INR:No new results in last 24 hours.     Imaging Results Review: I reviewed radiology reports from this admission including: CT abdomen/pelvis and MRI abdomen/MRCP.  Other Study Results Review: Pathology reports reviewed.    **Please note:  Dictation voice to text software may have been used in the creation of this record.  Occasional wrong word or “sound alike” substitutions may have occurred due to the inherent limitations of voice recognition software.  Read the chart carefully and recognize, using context, where substitutions have occurred.**

## 2025-02-28 NOTE — TELEPHONE ENCOUNTER
Pt  calling back as he states he received a call from Thi in regards to scheduling an apt for ordered tests. I dont see message in chart, however per hsp notes providers ordered PET-CT and PFT to schedule. Transferred to central to schedule

## 2025-02-28 NOTE — ASSESSMENT & PLAN NOTE
Pt underwent laparoscopic cholecystectomy on 2/25/2025.  Transaminases elevated postoperatively including a rising bilirubin.  MRI/MRCP with fluid and gas in the gallbladder fossa.  HIDA inconclusive.  Some concern for bile leak versus postoperative change.  After discussion with attending physician and advanced endoscopy Mary Kate Gautam, plan for boomerang ERCP procedure.  Maintain NPO.  Monitor serial abdominal examinations and trend LFTs.    Pertaining to the fluid collection, Dr. Mary Kate Gautam did recommend IR consultation for drainage.  I reached out to SHERLYN Gautam of IR (listed for [employer] coverage).  No in person physician coverage for IR at [facility] today.  He felt the collection was small and may indicate postoperative fluid.  He does not recommend a drain at this time.  He recommends proceeding with ERCP at some point in future checking CT scan.

## 2025-03-01 PROBLEM — K59.09 OTHER CONSTIPATION: Status: ACTIVE | Noted: 2025-03-01

## 2025-03-01 LAB
ALBUMIN SERPL BCG-MCNC: 3.7 G/DL (ref 3.5–5)
ALP SERPL-CCNC: 148 U/L (ref 34–104)
ALT SERPL W P-5'-P-CCNC: 169 U/L (ref 7–52)
ANION GAP SERPL CALCULATED.3IONS-SCNC: 9 MMOL/L (ref 4–13)
AST SERPL W P-5'-P-CCNC: 123 U/L (ref 13–39)
BASOPHILS # BLD AUTO: 0.02 THOUSANDS/ÂΜL (ref 0–0.1)
BASOPHILS NFR BLD AUTO: 1 % (ref 0–1)
BILIRUB DIRECT SERPL-MCNC: 3.72 MG/DL (ref 0–0.2)
BILIRUB SERPL-MCNC: 6.32 MG/DL (ref 0.2–1)
BUN SERPL-MCNC: 11 MG/DL (ref 5–25)
CALCIUM SERPL-MCNC: 8.7 MG/DL (ref 8.4–10.2)
CHLORIDE SERPL-SCNC: 102 MMOL/L (ref 96–108)
CO2 SERPL-SCNC: 27 MMOL/L (ref 21–32)
CREAT SERPL-MCNC: 0.74 MG/DL (ref 0.6–1.3)
EOSINOPHIL # BLD AUTO: 0.06 THOUSAND/ÂΜL (ref 0–0.61)
EOSINOPHIL NFR BLD AUTO: 2 % (ref 0–6)
ERYTHROCYTE [DISTWIDTH] IN BLOOD BY AUTOMATED COUNT: 13.3 % (ref 11.6–15.1)
GFR SERPL CREATININE-BSD FRML MDRD: 82 ML/MIN/1.73SQ M
GLUCOSE SERPL-MCNC: 114 MG/DL (ref 65–140)
HCT VFR BLD AUTO: 32.2 % (ref 34.8–46.1)
HGB BLD-MCNC: 10.5 G/DL (ref 11.5–15.4)
IMM GRANULOCYTES # BLD AUTO: 0.02 THOUSAND/UL (ref 0–0.2)
IMM GRANULOCYTES NFR BLD AUTO: 1 % (ref 0–2)
LYMPHOCYTES # BLD AUTO: 0.89 THOUSANDS/ÂΜL (ref 0.6–4.47)
LYMPHOCYTES NFR BLD AUTO: 26 % (ref 14–44)
MAGNESIUM SERPL-MCNC: 2 MG/DL (ref 1.9–2.7)
MCH RBC QN AUTO: 30 PG (ref 26.8–34.3)
MCHC RBC AUTO-ENTMCNC: 32.6 G/DL (ref 31.4–37.4)
MCV RBC AUTO: 92 FL (ref 82–98)
MONOCYTES # BLD AUTO: 0.29 THOUSAND/ÂΜL (ref 0.17–1.22)
MONOCYTES NFR BLD AUTO: 8 % (ref 4–12)
NEUTROPHILS # BLD AUTO: 2.21 THOUSANDS/ÂΜL (ref 1.85–7.62)
NEUTS SEG NFR BLD AUTO: 62 % (ref 43–75)
NRBC BLD AUTO-RTO: 0 /100 WBCS
PLATELET # BLD AUTO: 77 THOUSANDS/UL (ref 149–390)
PMV BLD AUTO: 10.6 FL (ref 8.9–12.7)
POTASSIUM SERPL-SCNC: 3.8 MMOL/L (ref 3.5–5.3)
PROCALCITONIN SERPL-MCNC: 2.96 NG/ML
PROT SERPL-MCNC: 6 G/DL (ref 6.4–8.4)
RBC # BLD AUTO: 3.5 MILLION/UL (ref 3.81–5.12)
SODIUM SERPL-SCNC: 138 MMOL/L (ref 135–147)
WBC # BLD AUTO: 3.49 THOUSAND/UL (ref 4.31–10.16)

## 2025-03-01 PROCEDURE — 80076 HEPATIC FUNCTION PANEL: CPT

## 2025-03-01 PROCEDURE — 83735 ASSAY OF MAGNESIUM: CPT

## 2025-03-01 PROCEDURE — 99024 POSTOP FOLLOW-UP VISIT: CPT | Performed by: SURGERY

## 2025-03-01 PROCEDURE — 99233 SBSQ HOSP IP/OBS HIGH 50: CPT | Performed by: FAMILY MEDICINE

## 2025-03-01 PROCEDURE — 80048 BASIC METABOLIC PNL TOTAL CA: CPT

## 2025-03-01 PROCEDURE — 85025 COMPLETE CBC W/AUTO DIFF WBC: CPT

## 2025-03-01 PROCEDURE — 84145 PROCALCITONIN (PCT): CPT

## 2025-03-01 RX ORDER — ALLOPURINOL 100 MG/1
100 TABLET ORAL DAILY
Status: DISCONTINUED | OUTPATIENT
Start: 2025-03-01 | End: 2025-03-02 | Stop reason: HOSPADM

## 2025-03-01 RX ORDER — AMOXICILLIN 250 MG
1 CAPSULE ORAL
Status: DISCONTINUED | OUTPATIENT
Start: 2025-03-01 | End: 2025-03-02 | Stop reason: HOSPADM

## 2025-03-01 RX ORDER — ENOXAPARIN SODIUM 100 MG/ML
40 INJECTION SUBCUTANEOUS
Status: DISCONTINUED | OUTPATIENT
Start: 2025-03-01 | End: 2025-03-02 | Stop reason: HOSPADM

## 2025-03-01 RX ORDER — POLYETHYLENE GLYCOL 3350 17 G/17G
17 POWDER, FOR SOLUTION ORAL DAILY
Status: DISCONTINUED | OUTPATIENT
Start: 2025-03-01 | End: 2025-03-02 | Stop reason: HOSPADM

## 2025-03-01 RX ORDER — ATENOLOL 50 MG/1
50 TABLET ORAL 2 TIMES DAILY
Status: DISCONTINUED | OUTPATIENT
Start: 2025-03-01 | End: 2025-03-02 | Stop reason: HOSPADM

## 2025-03-01 RX ORDER — BISACODYL 5 MG/1
5 TABLET, DELAYED RELEASE ORAL DAILY PRN
Status: DISCONTINUED | OUTPATIENT
Start: 2025-03-01 | End: 2025-03-02 | Stop reason: HOSPADM

## 2025-03-01 RX ORDER — AMLODIPINE BESYLATE 2.5 MG/1
2.5 TABLET ORAL DAILY
Status: DISCONTINUED | OUTPATIENT
Start: 2025-03-01 | End: 2025-03-02 | Stop reason: HOSPADM

## 2025-03-01 RX ORDER — LOSARTAN POTASSIUM 50 MG/1
50 TABLET ORAL DAILY
Status: DISCONTINUED | OUTPATIENT
Start: 2025-03-01 | End: 2025-03-02 | Stop reason: HOSPADM

## 2025-03-01 RX ADMIN — HYDRALAZINE HYDROCHLORIDE 5 MG: 20 INJECTION INTRAMUSCULAR; INTRAVENOUS at 15:59

## 2025-03-01 RX ADMIN — CYANOCOBALAMIN TAB 1000 MCG 1000 MCG: 1000 TAB at 17:04

## 2025-03-01 RX ADMIN — LEVOTHYROXINE SODIUM 100 MCG: 100 TABLET ORAL at 21:19

## 2025-03-01 RX ADMIN — OXYCODONE HYDROCHLORIDE 10 MG: 10 TABLET ORAL at 04:13

## 2025-03-01 RX ADMIN — OXYCODONE HYDROCHLORIDE 10 MG: 10 TABLET ORAL at 11:59

## 2025-03-01 RX ADMIN — ENOXAPARIN SODIUM 40 MG: 40 INJECTION SUBCUTANEOUS at 12:20

## 2025-03-01 RX ADMIN — POLYETHYLENE GLYCOL 3350 17 G: 17 POWDER, FOR SOLUTION ORAL at 14:30

## 2025-03-01 RX ADMIN — LOSARTAN POTASSIUM 50 MG: 50 TABLET, FILM COATED ORAL at 17:04

## 2025-03-01 RX ADMIN — SODIUM CHLORIDE, SODIUM GLUCONATE, SODIUM ACETATE, POTASSIUM CHLORIDE, MAGNESIUM CHLORIDE, SODIUM PHOSPHATE, DIBASIC, AND POTASSIUM PHOSPHATE 100 ML/HR: .53; .5; .37; .037; .03; .012; .00082 INJECTION, SOLUTION INTRAVENOUS at 04:14

## 2025-03-01 RX ADMIN — OXYCODONE HYDROCHLORIDE 10 MG: 10 TABLET ORAL at 21:18

## 2025-03-01 RX ADMIN — ATENOLOL 50 MG: 50 TABLET ORAL at 17:04

## 2025-03-01 RX ADMIN — SENNOSIDES AND DOCUSATE SODIUM 1 TABLET: 50; 8.6 TABLET ORAL at 21:19

## 2025-03-01 RX ADMIN — AMLODIPINE BESYLATE 2.5 MG: 2.5 TABLET ORAL at 17:04

## 2025-03-01 RX ADMIN — PANTOPRAZOLE SODIUM 40 MG: 40 INJECTION, POWDER, FOR SOLUTION INTRAVENOUS at 09:45

## 2025-03-01 RX ADMIN — ALLOPURINOL 100 MG: 100 TABLET ORAL at 17:04

## 2025-03-01 NOTE — ASSESSMENT & PLAN NOTE
Noted postoperatively, most likely reactive  There was concern for a biliary leak, however reassuring results on both MRI/MRCP and HIDA scan  With bilirubin continuing to uptrend, the patient was transferred to [facility] for ERCP on 2/28/25. Unable to canalize CBD due to anatomy  Monitor bilirubin, if upward trend or persistently elevated may require repeat ERCP

## 2025-03-01 NOTE — ASSESSMENT & PLAN NOTE
Patient followed by Dr. Roberto Fratamico hematologist from [facility].  Platelet count here 108.

## 2025-03-01 NOTE — ASSESSMENT & PLAN NOTE
Patient with degenerative joint disease right hip, scheduled for right total hip arthroplasty on April 14 in [place] by Dr. Anurag Talamantes.

## 2025-03-01 NOTE — ASSESSMENT & PLAN NOTE
Ashley Phillips is a 69 y.o. female s/p 2/26 lap adrianne for cholecystitis    Postoperative course complicated by hyperbilirubinemia.  Patient with chronic thrombocytopenia, platelet count today dropped to 72.  Hemoglobin stable.  Total bilirubin slightly decreased today.  LFTs continue to trend down.  Unfortunately unsuccessful ERCP yesterday given diverticulum and therefore unable able to cannulate the duct.  Abdomen is soft, appropriately tender.  Pulmonary diet.      HIDA scan yesterday showed hepatic retention of radiotracer without evidence of biliary excretion, findings may related to biliary obstruction or parasellar dysfunction, evaluation for bile leak is therefore limited without biliary excretion.    MRI/MRCP:  Fluid and gas in the gallbladder fossa, with differential as above. Given significant elevation in the total bilirubin, biliary leak is likely. To exclude an active biliary leak, consider HIDA scan if clinically warranted.  Hepatomegaly. Hepatic steatosis.  Significant splenomegaly. It seems out of proportion to hepatocellular disease. Correlate to exclude hematologic pathology.  Trace pleural fluid and bibasal atelectatic changes       PLAN:   -Diet as tolerated  -Trend labs  -Will reevaluate LFTs tomorrow along with total bilirubin.  If continues to trend down then potential DC tomorrow with GI follow-up and repeat LFTs as an outpatient

## 2025-03-01 NOTE — ASSESSMENT & PLAN NOTE
Presented from home after eating a cheeseburger and fries for evening meal with right sided upper quadrant abdominal pain radiating to back with nausea  Abdominal ultrasound with cholelithiasis. No gallbladder wall thickening or pericholecystic fluid   No fever or leukocytosis  The patient underwent a laparoscopic cholecystectomy 2/26/2025 with perioperative findings consistent with acute cholecystitis  Was noted for an elevated bilirubin level for which she underwent additional imaging including MRI/MRCP and HIDA scan.  Fortunately no evidence of obstruction or biliary leak on imaging.  2/28/25 with bilirubin continuing to trend up, the patient was transferred to [facility] for an ERCP.  Unfortunately, due to her anatomy they were unable to cannulate her CBD.  She may require repeat study pending bilirubin levels  Continue to monitor LFTs  Adding bowel regimen

## 2025-03-01 NOTE — PROGRESS NOTES
Progress Note - Surgery-General   Name: Ashley Phillips 69 y.o. female I MRN: [id number]  Unit/Bed#: 404-01 I Date of Admission: 2/25/2025   Date of Service: 3/1/2025 I Hospital Day: 3    Assessment & Plan  Biliary colic  Ashley Phillips is a 69 y.o. female s/p 2/26 lap adrianne for cholecystitis    Postoperative course complicated by hyperbilirubinemia.  Patient with chronic thrombocytopenia, platelet count today dropped to 72.  Hemoglobin stable.  Total bilirubin slightly decreased today.  LFTs continue to trend down.  Unfortunately unsuccessful ERCP yesterday given diverticulum and therefore unable able to cannulate the duct.  Abdomen is soft, appropriately tender.  Pulmonary diet.      HIDA scan yesterday showed hepatic retention of radiotracer without evidence of biliary excretion, findings may related to biliary obstruction or parasellar dysfunction, evaluation for bile leak is therefore limited without biliary excretion.    MRI/MRCP:  Fluid and gas in the gallbladder fossa, with differential as above. Given significant elevation in the total bilirubin, biliary leak is likely. To exclude an active biliary leak, consider HIDA scan if clinically warranted.  Hepatomegaly. Hepatic steatosis.  Significant splenomegaly. It seems out of proportion to hepatocellular disease. Correlate to exclude hematologic pathology.  Trace pleural fluid and bibasal atelectatic changes       PLAN:   -Diet as tolerated  -Trend labs  -Will reevaluate LFTs tomorrow along with total bilirubin.  If continues to trend down then potential DC tomorrow with GI follow-up and repeat LFTs as an outpatient    Hypothyroidism  Continue p.o. levothyroxine, management by medicine service.  Thrombocytopenia (HCC)  Patient followed by Dr. Roberto Fratamico hematologist from [facility].  Platelet count here 108.  Hypertensive crisis  Managed by attending primary service.  BP on arrival 206/100.  IV hydralazine, continue as  needed.  Continue to monitor blood pressure.  Lung mass  Discovered, confirmed on CT scan chest.  Right upper lobe mass 3 cm.  Current smoker, 3 cigarettes/day.  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  Denies hemoptysis,  notes occasional dry cough.  Recently transitioned from ACE inhibitor to ARB because of cough.  Patient claims weight unchanged.  Primary osteoarthritis of right hip  Patient with degenerative joint disease right hip, scheduled for right total hip arthroplasty on April 14 in [place] by Dr. Anurag Talamantes.    Splenomegaly    Elevated bilirubin      I have discussed the above management plan in detail with the primary service.     Subjective   No new complaints.  Feels well.  Feels improved compared to yesterday.  No significant abdominal discomfort.  Tolerating diet.    Objective :  Temp:  [97.4 °F (36.3 °C)-98.7 °F (37.1 °C)] 97.9 °F (36.6 °C)  HR:  [73-81] 81  BP: (123-181)/(66-92) 168/92  Resp:  [14-20] 14  SpO2:  [95 %-100 %] 96 %  O2 Device: None (Room air)    I/O         02/27 0701  02/28 0700 02/28 0701  03/01 0700 03/01 0701  03/02 0700    P.O. 0 240 240    I.V. (mL/kg)  900 (14.4)     IV Piggyback       Total Intake(mL/kg) 0 (0) 1140 (18.2) 240 (3.8)    Net 0 +1140 +240           Unmeasured Urine Occurrence  1 x             Physical Exam  Vitals reviewed.   Constitutional:       General: She is not in acute distress.     Appearance: Normal appearance. She is not ill-appearing, toxic-appearing or diaphoretic.   Eyes:      General: Scleral icterus present.   Abdominal:      General: There is no distension.      Palpations: There is no mass.      Tenderness: There is abdominal tenderness.      Hernia: No hernia is present.      Comments: Abdomen soft.  Appropriately tender.  Incisions clean dry and intact   Neurological:      Mental Status: She is alert.           Lab Results: I have reviewed the following results:  Recent Labs     03/01/25  6236   WBC 3.49*   HGB 10.5*   HCT 32.2*    PLT 77*   SODIUM 138   K 3.8      CO2 27   BUN 11   CREATININE 0.74   GLUC 114   MG 2.0   *   *   ALB 3.7   TBILI 6.32*   ALKPHOS 148*       Imaging Results Review: I personally reviewed the following image studies in PACS and associated radiology reports: MRI abdomen/MRCP. My interpretation of the radiology images/reports is: CBD appears intact.  No filling defects..  Other Study Results Review: No additional pertinent studies reviewed.    VTE Pharmacologic Prophylaxis: Enoxaparin (Lovenox)  VTE Mechanical Prophylaxis: sequential compression device

## 2025-03-01 NOTE — ASSESSMENT & PLAN NOTE
Chronic thrombocytopenia   Evaluated by heme-onc in the outpatient setting and felt this is most likely ITP   Platelet count 108 on admission, decreased and stable in the 70's  Resume Lovenox for DVT prophylaxis  Noted for significant splenomegaly on imaging.  Will need to address this with heme-onc

## 2025-03-01 NOTE — PLAN OF CARE
Problem: PAIN - ADULT  Goal: Verbalizes/displays adequate comfort level or baseline comfort level  Description: Interventions:  - Encourage patient to monitor pain and request assistance  - Assess pain using appropriate pain scale  - Administer analgesics based on type and severity of pain and evaluate response  - Implement non-pharmacological measures as appropriate and evaluate response  - Consider cultural and social influences on pain and pain management  - Notify physician/advanced practitioner if interventions unsuccessful or patient reports new pain  Outcome: Progressing     Problem: INFECTION - ADULT  Goal: Absence or prevention of progression during hospitalization  Description: INTERVENTIONS:  - Assess and monitor for signs and symptoms of infection  - Monitor lab/diagnostic results  - Monitor all insertion sites, i.e. indwelling lines, tubes, and drains  - Monitor endotracheal if appropriate and nasal secretions for changes in amount and color  - [place] appropriate cooling/warming therapies per order  - Administer medications as ordered  - Instruct and encourage patient and family to use good hand hygiene technique  - Identify and instruct in appropriate isolation precautions for identified infection/condition  Outcome: Progressing     Problem: SAFETY ADULT  Goal: Patient will remain free of falls  Description: INTERVENTIONS:  - Educate patient/family on patient safety including physical limitations  - Instruct patient to call for assistance with activity   - Consult OT/PT to assist with strengthening/mobility   - Keep Call bell within reach  - Keep bed low and locked with side rails adjusted as appropriate  - Keep care items and personal belongings within reach  - Initiate and maintain comfort rounds  - Make Fall Risk Sign visible to staff  - Offer Toileting every 2 Hours, in advance of need  - Initiate/Maintain bed/chair alarm  - Obtain necessary fall risk management equipment: non slip socks  -  Apply yellow socks and bracelet for high fall risk patients  - Consider moving patient to room near nurses station  Outcome: Progressing  Goal: Maintain or return to baseline ADL function  Description: INTERVENTIONS:  -  Assess patient's ability to carry out ADLs; assess patient's baseline for ADL function and identify physical deficits which impact ability to perform ADLs (bathing, care of mouth/teeth, toileting, grooming, dressing, etc.)  - Assess/evaluate cause of self-care deficits   - Assess range of motion  - Assess patient's mobility; develop plan if impaired  - Assess patient's need for assistive devices and provide as appropriate  - Encourage maximum independence but intervene and supervise when necessary  - Involve family in performance of ADLs  - Assess for home care needs following discharge   - Consider OT consult to assist with ADL evaluation and planning for discharge  - Provide patient education as appropriate  Outcome: Progressing  Goal: Maintains/Returns to pre admission functional level  Description: INTERVENTIONS:  - Perform AM-PAC 6 Click Basic Mobility/ Daily Activity assessment daily.  - Set and communicate daily mobility goal to care team and patient/family/caregiver.   - Collaborate with rehabilitation services on mobility goals if consulted  - Perform Range of Motion 3 times a day.  - Reposition patient every 2 hours.  - Dangle patient 3 times a day  - Stand patient 3 times a day  - Ambulate patient 3 times a day  - Out of bed to chair 3 times a day   - Out of bed for meals 3 times a day  - Out of bed for toileting  - Record patient progress and toleration of activity level   Outcome: Progressing     Problem: DISCHARGE PLANNING  Goal: Discharge to home or other facility with appropriate resources  Description: INTERVENTIONS:  - Identify barriers to discharge w/patient and caregiver  - Arrange for needed discharge resources and transportation as appropriate  - Identify discharge learning  needs (meds, wound care, etc.)  - Arrange for interpretive services to assist at discharge as needed  - Refer to Case Management Department for coordinating discharge planning if the patient needs post-hospital services based on physician/advanced practitioner order or complex needs related to functional status, cognitive ability, or social support system  Outcome: Progressing     Problem: Knowledge Deficit  Goal: Patient/family/caregiver demonstrates understanding of disease process, treatment plan, medications, and discharge instructions  Description: Complete learning assessment and assess knowledge base.  Interventions:  - Provide teaching at level of understanding  - Provide teaching via preferred learning methods  Outcome: Progressing

## 2025-03-01 NOTE — CASE MANAGEMENT
Case Management Discharge Planning Note    Patient name Ashley Phillips  Location /404-01 MRN [id number]  : 1955 Date 3/1/2025       Current Admission Date: 2025  Current Admission Diagnosis:Biliary colic   Patient Active Problem List    Diagnosis Date Noted Date Diagnosed    Elevated bilirubin 2025     Biliary colic 2025     Hypertensive crisis 2025     Lung mass 2025     Recurrent occipital headache 2025     Primary osteoarthritis of right hip 2025     Chronic right-sided low back pain with right-sided sciatica 2024     Wheeze 2024     Idiopathic gout involving toe of left foot 2024     Autoimmune thrombocytopenia (HCC) 2024     Thyroid cancer (HCC) 2024     Sacroiliitis (HCC) 2023     Thrombocytopenia (HCC) 2023     Essential hypertension 2023     FELICIA positive 2022     Splenomegaly 2022     History of thyroid cancer 2015     Hypothyroidism 2015       LOS (days): 3  Geometric Mean LOS (GMLOS) (days): 3.3  Days to GMLOS:0.5     OBJECTIVE:  Risk of Unplanned Readmission Score: 7.75         Current admission status: Inpatient   Preferred Pharmacy:   [id number] - [place] [place] - [address] [place] [address]  [address] [facility] [address]  Phone: [phone] Fax: [phone]    Primary Care Provider: Sulaiman Hernandez DO    Primary Insurance: MEDICARE  Secondary Insurance: [facility]    DISCHARGE DETAILS:    IMM Given (Date):: 25  IMM Given to:: Patient   Tentative discharge home tomorrow. No discharge needs noted.

## 2025-03-01 NOTE — PLAN OF CARE
Problem: PAIN - ADULT  Goal: Verbalizes/displays adequate comfort level or baseline comfort level  Description: Interventions:  - Encourage patient to monitor pain and request assistance  - Assess pain using appropriate pain scale  - Administer analgesics based on type and severity of pain and evaluate response  - Implement non-pharmacological measures as appropriate and evaluate response  - Consider cultural and social influences on pain and pain management  - Notify physician/advanced practitioner if interventions unsuccessful or patient reports new pain  Outcome: Progressing     Problem: INFECTION - ADULT  Goal: Absence or prevention of progression during hospitalization  Description: INTERVENTIONS:  - Assess and monitor for signs and symptoms of infection  - Monitor lab/diagnostic results  - Monitor all insertion sites, i.e. indwelling lines, tubes, and drains  - Monitor endotracheal if appropriate and nasal secretions for changes in amount and color  - [place] appropriate cooling/warming therapies per order  - Administer medications as ordered  - Instruct and encourage patient and family to use good hand hygiene technique  - Identify and instruct in appropriate isolation precautions for identified infection/condition  Outcome: Progressing     Problem: SAFETY ADULT  Goal: Patient will remain free of falls  Description: INTERVENTIONS:  - Educate patient/family on patient safety including physical limitations  - Instruct patient to call for assistance with activity   - Consult OT/PT to assist with strengthening/mobility   - Keep Call bell within reach  - Keep bed low and locked with side rails adjusted as appropriate  - Keep care items and personal belongings within reach  - Initiate and maintain comfort rounds  - Make Fall Risk Sign visible to staff  - Offer Toileting every 2 Hours, in advance of need  - Initiate/Maintain bed/chair alarm  - Obtain necessary fall risk management equipment: non slip socks  -  Apply yellow socks and bracelet for high fall risk patients  - Consider moving patient to room near nurses station  Outcome: Progressing  Goal: Maintain or return to baseline ADL function  Description: INTERVENTIONS:  -  Assess patient's ability to carry out ADLs; assess patient's baseline for ADL function and identify physical deficits which impact ability to perform ADLs (bathing, care of mouth/teeth, toileting, grooming, dressing, etc.)  - Assess/evaluate cause of self-care deficits   - Assess range of motion  - Assess patient's mobility; develop plan if impaired  - Assess patient's need for assistive devices and provide as appropriate  - Encourage maximum independence but intervene and supervise when necessary  - Involve family in performance of ADLs  - Assess for home care needs following discharge   - Consider OT consult to assist with ADL evaluation and planning for discharge  - Provide patient education as appropriate  Outcome: Progressing  Goal: Maintains/Returns to pre admission functional level  Description: INTERVENTIONS:  - Perform AM-PAC 6 Click Basic Mobility/ Daily Activity assessment daily.  - Set and communicate daily mobility goal to care team and patient/family/caregiver.   - Collaborate with rehabilitation services on mobility goals if consulted  - Perform Range of Motion 3 times a day.  - Reposition patient every 2 hours.  - Dangle patient 3 times a day  - Stand patient 3 times a day  - Ambulate patient 3 times a day  - Out of bed to chair 3 times a day   - Out of bed for meals 3 times a day  - Out of bed for toileting  - Record patient progress and toleration of activity level   Outcome: Progressing     Problem: DISCHARGE PLANNING  Goal: Discharge to home or other facility with appropriate resources  Description: INTERVENTIONS:  - Identify barriers to discharge w/patient and caregiver  - Arrange for needed discharge resources and transportation as appropriate  - Identify discharge learning  needs (meds, wound care, etc.)  - Arrange for interpretive services to assist at discharge as needed  - Refer to Case Management Department for coordinating discharge planning if the patient needs post-hospital services based on physician/advanced practitioner order or complex needs related to functional status, cognitive ability, or social support system  Outcome: Progressing     Problem: Knowledge Deficit  Goal: Patient/family/caregiver demonstrates understanding of disease process, treatment plan, medications, and discharge instructions  Description: Complete learning assessment and assess knowledge base.  Interventions:  - Provide teaching at level of understanding  - Provide teaching via preferred learning methods  Outcome: Progressing

## 2025-03-01 NOTE — PROGRESS NOTES
"Progress Note - Hospitalist   Name: Ashley Phillips 69 y.o. female I MRN: [id number]  Unit/Bed#: 404-01 I Date of Admission: 2/25/2025   Date of Service: 3/1/2025 I Hospital Day: 3    Assessment & Plan  Biliary colic  Presented from home after eating a cheeseburger and fries for evening meal with right sided upper quadrant abdominal pain radiating to back with nausea  Abdominal ultrasound with cholelithiasis. No gallbladder wall thickening or pericholecystic fluid   No fever or leukocytosis  The patient underwent a laparoscopic cholecystectomy 2/26/2025 with perioperative findings consistent with acute cholecystitis  Was noted for an elevated bilirubin level for which she underwent additional imaging including MRI/MRCP and HIDA scan.  Fortunately no evidence of obstruction or biliary leak on imaging.  2/28/25 with bilirubin continuing to trend up, the patient was transferred to [facility] for an ERCP.  Unfortunately, due to her anatomy they were unable to cannulate her CBD.  She may require repeat study pending bilirubin levels  Continue to monitor LFTs  Adding bowel regimen  Hypertensive crisis  Recently transitioned from ACE inhibitor to ARB due to cough  Continue PTA regimen amlodipine 2.5 mg daily, atenolol 50 mg twice daily, ARB  /100 on ED arrival  Improved with IV hydralazine, will continue PRN  Blood pressure improved and was likely elevated this is a reactive process in setting of acute pain and inflammation  Lung mass  Had outpatient CTA head and neck, noted 2.5 cm right lung mass  CT chest on current admission reveals a \"3 cm right upper lobe mass abutting the pleura with several pleural tags compatible with primary lung cancer.\"  Appreciate pulmonology input.  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  Will need to address this with heme-onc  Splenomegaly  Noted on imaging  To address with heme-onc outpatient  Primary osteoarthritis of right hip  Scheduled for hip replacement in near future  Elevated bilirubin  Noted postoperatively, most likely reactive  There was concern for a biliary leak, however reassuring results on both MRI/MRCP and HIDA scan  With bilirubin continuing to uptrend, the patient was transferred to [facility] for ERCP on 2/28/25. Unable to canalize CBD due to anatomy  Monitor bilirubin, if upward trend or persistently elevated may require repeat ERCP  Other constipation  Initiate on bowel regimen    VTE Pharmacologic Prophylaxis:    Lovenox    Mobility:   Basic Mobility Inpatient Raw Score: 24  JH-HLM Goal: 8: Walk 250 feet or more  JH-HLM Achieved: 8: Walk 250 feet ot more    Patient Centered Rounds: I performed bedside rounds with nursing staff today.   Discussions with Specialists or Other Care Team Provider: Gen surgery    Education and Discussions with Family / Patient: Updated  () at bedside.    Current Length of Stay: 3 day(s)  Current Patient Status: Inpatient   Certification Statement: The patient will continue to require additional inpatient hospital stay due to close monitoring  Discharge Plan: Anticipate discharge in 24-48 hrs to home.    Code Status: Level 1 - Full Code    Subjective   Patient reports feeling well with some residual postoperative abdominal pain. No BM in a few days. Had breakfast.    Objective :  Temp:  [97.9 °F (36.6 °C)-98.7 °F (37.1 °C)] 97.9 °F (36.6 °C)  HR:  [73-81] 81  BP: (123-181)/(66-92) 168/92  Resp:  [14-20] 14  SpO2:  [95 %-100 %] 96 %  O2 Device: None (Room air)    Body mass index is 24.45 kg/m².     Input and Output Summary (last 24 hours):      Intake/Output Summary (Last 24 hours) at 3/1/2025 1415  Last data filed at 3/1/2025 1226  Gross per 24 hour   Intake 1680 ml   Output --   Net 1680 ml       Physical Exam  Constitutional:       General: She is not in acute distress.  HENT:      Head: Normocephalic and atraumatic.   Eyes:      Conjunctiva/sclera: Conjunctivae normal.   Cardiovascular:      Rate and Rhythm: Normal rate and regular rhythm.   Pulmonary:      Effort: No respiratory distress.      Breath sounds: No wheezing or rales.   Abdominal:      General: There is no distension.      Tenderness: There is no abdominal tenderness. There is no guarding.   Musculoskeletal:      Right lower leg: No edema.      Left lower leg: No edema.   Skin:     General: Skin is warm and dry.   Neurological:      Mental Status: She is oriented to person, place, and time.   Psychiatric:         Mood and Affect: Mood normal.         Lines/Drains:              Lab Results: I have reviewed the following results:   Results from last 7 days   Lab Units 03/01/25  0415   WBC Thousand/uL 3.49*   HEMOGLOBIN g/dL 10.5*   HEMATOCRIT % 32.2*   PLATELETS Thousands/uL 77*   SEGS PCT % 62   LYMPHO PCT % 26   MONO PCT % 8   EOS PCT % 2     Results from last 7 days   Lab Units 03/01/25  0415   SODIUM mmol/L 138   POTASSIUM mmol/L 3.8   CHLORIDE mmol/L 102   CO2 mmol/L 27   BUN mg/dL 11   CREATININE mg/dL 0.74   ANION GAP mmol/L 9   CALCIUM mg/dL 8.7   ALBUMIN g/dL 3.7   TOTAL BILIRUBIN mg/dL 6.32*   ALK PHOS U/L 148*   ALT U/L 169*   AST U/L 123*   GLUCOSE RANDOM mg/dL 114                 Results from last 7 days   Lab Units 03/01/25  0415   PROCALCITONIN ng/ml 2.96*       Recent Cultures (last 7 days):         Imaging Results Review: I reviewed radiology reports from this admission including: ERCP report.      Last 24 Hours Medication List:     Current Facility-Administered Medications:     acetaminophen (TYLENOL) tablet 650 mg, Q6H PRN    bisacodyl (DULCOLAX) EC tablet 5 mg, Daily  PRN    enoxaparin (LOVENOX) subcutaneous injection 40 mg, Q24H ROBBIE    hydrALAZINE (APRESOLINE) injection 5 mg, Q6H PRN    HYDROmorphone (DILAUDID) injection 0.5 mg, Q3H PRN    levothyroxine tablet 100 mcg, HS    ondansetron (ZOFRAN) injection 4 mg, Q8H PRN    oxyCODONE (ROXICODONE) immediate release tablet 10 mg, Q6H PRN    pantoprazole (PROTONIX) injection 40 mg, Q24H ROBBIE    polyethylene glycol (MIRALAX) packet 17 g, Daily    senna-docusate sodium (SENOKOT S) 8.6-50 mg per tablet 1 tablet, HS    Administrative Statements   Today, Patient Was Seen By: Adelina Peña MD  I have spent a total time of 52 minutes in caring for this patient on the day of the visit/encounter including Diagnostic results, Prognosis, Patient and family education, Impressions, Reviewing/placing orders in the medical record (including tests, medications, and/or procedures), Obtaining or reviewing history  , and Communicating with other healthcare professionals .    **Please Note: This note may have been constructed using a voice recognition system.**

## 2025-03-02 VITALS
HEIGHT: 63 IN | DIASTOLIC BLOOD PRESSURE: 75 MMHG | BODY MASS INDEX: 24.45 KG/M2 | SYSTOLIC BLOOD PRESSURE: 149 MMHG | HEART RATE: 72 BPM | RESPIRATION RATE: 18 BRPM | TEMPERATURE: 97.9 F | OXYGEN SATURATION: 97 % | WEIGHT: 138 LBS

## 2025-03-02 LAB
ALBUMIN SERPL BCG-MCNC: 4.2 G/DL (ref 3.5–5)
ALP SERPL-CCNC: 174 U/L (ref 34–104)
ALT SERPL W P-5'-P-CCNC: 183 U/L (ref 7–52)
ANION GAP SERPL CALCULATED.3IONS-SCNC: 9 MMOL/L (ref 4–13)
AST SERPL W P-5'-P-CCNC: 117 U/L (ref 13–39)
BASOPHILS # BLD AUTO: 0.03 THOUSANDS/ÂΜL (ref 0–0.1)
BASOPHILS NFR BLD AUTO: 1 % (ref 0–1)
BILIRUB DIRECT SERPL-MCNC: 2.28 MG/DL (ref 0–0.2)
BILIRUB SERPL-MCNC: 4.56 MG/DL (ref 0.2–1)
BUN SERPL-MCNC: 11 MG/DL (ref 5–25)
CALCIUM SERPL-MCNC: 9.8 MG/DL (ref 8.4–10.2)
CHLORIDE SERPL-SCNC: 100 MMOL/L (ref 96–108)
CO2 SERPL-SCNC: 28 MMOL/L (ref 21–32)
CREAT SERPL-MCNC: 0.8 MG/DL (ref 0.6–1.3)
EOSINOPHIL # BLD AUTO: 0.12 THOUSAND/ÂΜL (ref 0–0.61)
EOSINOPHIL NFR BLD AUTO: 2 % (ref 0–6)
ERYTHROCYTE [DISTWIDTH] IN BLOOD BY AUTOMATED COUNT: 13.2 % (ref 11.6–15.1)
GFR SERPL CREATININE-BSD FRML MDRD: 74 ML/MIN/1.73SQ M
GLUCOSE SERPL-MCNC: 111 MG/DL (ref 65–140)
HCT VFR BLD AUTO: 36.6 % (ref 34.8–46.1)
HGB BLD-MCNC: 11.9 G/DL (ref 11.5–15.4)
IMM GRANULOCYTES # BLD AUTO: 0.03 THOUSAND/UL (ref 0–0.2)
IMM GRANULOCYTES NFR BLD AUTO: 1 % (ref 0–2)
LYMPHOCYTES # BLD AUTO: 1.75 THOUSANDS/ÂΜL (ref 0.6–4.47)
LYMPHOCYTES NFR BLD AUTO: 32 % (ref 14–44)
MCH RBC QN AUTO: 30.2 PG (ref 26.8–34.3)
MCHC RBC AUTO-ENTMCNC: 32.5 G/DL (ref 31.4–37.4)
MCV RBC AUTO: 93 FL (ref 82–98)
MONOCYTES # BLD AUTO: 0.6 THOUSAND/ÂΜL (ref 0.17–1.22)
MONOCYTES NFR BLD AUTO: 11 % (ref 4–12)
NEUTROPHILS # BLD AUTO: 3 THOUSANDS/ÂΜL (ref 1.85–7.62)
NEUTS SEG NFR BLD AUTO: 53 % (ref 43–75)
NRBC BLD AUTO-RTO: 0 /100 WBCS
PLATELET # BLD AUTO: 99 THOUSANDS/UL (ref 149–390)
PMV BLD AUTO: 10.6 FL (ref 8.9–12.7)
POTASSIUM SERPL-SCNC: 4.3 MMOL/L (ref 3.5–5.3)
PROT SERPL-MCNC: 6.8 G/DL (ref 6.4–8.4)
RBC # BLD AUTO: 3.94 MILLION/UL (ref 3.81–5.12)
SODIUM SERPL-SCNC: 137 MMOL/L (ref 135–147)
WBC # BLD AUTO: 5.53 THOUSAND/UL (ref 4.31–10.16)

## 2025-03-02 PROCEDURE — 85025 COMPLETE CBC W/AUTO DIFF WBC: CPT | Performed by: FAMILY MEDICINE

## 2025-03-02 PROCEDURE — 99239 HOSP IP/OBS DSCHRG MGMT >30: CPT | Performed by: FAMILY MEDICINE

## 2025-03-02 PROCEDURE — 99024 POSTOP FOLLOW-UP VISIT: CPT | Performed by: SURGERY

## 2025-03-02 PROCEDURE — 80048 BASIC METABOLIC PNL TOTAL CA: CPT | Performed by: FAMILY MEDICINE

## 2025-03-02 PROCEDURE — 80076 HEPATIC FUNCTION PANEL: CPT | Performed by: FAMILY MEDICINE

## 2025-03-02 RX ADMIN — BISACODYL 5 MG: 5 TABLET, COATED ORAL at 09:58

## 2025-03-02 RX ADMIN — PANTOPRAZOLE SODIUM 40 MG: 40 INJECTION, POWDER, FOR SOLUTION INTRAVENOUS at 09:58

## 2025-03-02 RX ADMIN — ALLOPURINOL 100 MG: 100 TABLET ORAL at 08:40

## 2025-03-02 RX ADMIN — OXYCODONE HYDROCHLORIDE 10 MG: 10 TABLET ORAL at 04:49

## 2025-03-02 RX ADMIN — AMLODIPINE BESYLATE 2.5 MG: 2.5 TABLET ORAL at 08:40

## 2025-03-02 RX ADMIN — CYANOCOBALAMIN TAB 1000 MCG 1000 MCG: 1000 TAB at 08:40

## 2025-03-02 RX ADMIN — ENOXAPARIN SODIUM 40 MG: 40 INJECTION SUBCUTANEOUS at 08:40

## 2025-03-02 RX ADMIN — ATENOLOL 50 MG: 50 TABLET ORAL at 08:40

## 2025-03-02 RX ADMIN — POLYETHYLENE GLYCOL 3350 17 G: 17 POWDER, FOR SOLUTION ORAL at 08:40

## 2025-03-02 RX ADMIN — LOSARTAN POTASSIUM 50 MG: 50 TABLET, FILM COATED ORAL at 08:40

## 2025-03-02 NOTE — ASSESSMENT & PLAN NOTE
Chronic thrombocytopenia   Evaluated by heme-onc in the outpatient setting and felt this is most likely ITP   Platelets are stable at 99 at time of discharge

## 2025-03-02 NOTE — CASE MANAGEMENT
Case Management Discharge Planning Note    Patient name Ashley Phillips  Location /404-01 MRN [id number]  : 1955 Date 3/2/2025       Current Admission Date: 2025  Current Admission Diagnosis:Biliary colic   Patient Active Problem List    Diagnosis Date Noted Date Diagnosed    Other constipation 2025     Elevated bilirubin 2025     Biliary colic 2025     Hypertensive crisis 2025     Lung mass 2025     Recurrent occipital headache 2025     Primary osteoarthritis of right hip 2025     Chronic right-sided low back pain with right-sided sciatica 2024     Wheeze 2024     Idiopathic gout involving toe of left foot 2024     Autoimmune thrombocytopenia (HCC) 2024     Thyroid cancer (HCC) 2024     Sacroiliitis (HCC) 2023     Thrombocytopenia (HCC) 2023     Essential hypertension 2023     FELICIA positive 2022     Splenomegaly 2022     History of thyroid cancer 2015     Hypothyroidism 2015       LOS (days): 4  Geometric Mean LOS (GMLOS) (days): 3.3  Days to GMLOS:-0.5     OBJECTIVE:  Risk of Unplanned Readmission Score: 11.3         Current admission status: Inpatient   Preferred Pharmacy:   [id number] - [place], [place] - [address] [place] [address]  [address] [facility] [address]  Phone: [phone] Fax: [phone]    Primary Care Provider: Sulaiman Hernandez DO    Primary Insurance: MEDICARE  Secondary Insurance: AAR    DISCHARGE DETAILS:patient discharging home today. Nurse to review AVS, all follow up providers listed. No discharge needs noted.

## 2025-03-02 NOTE — ASSESSMENT & PLAN NOTE
Presented from home after eating a cheeseburger and fries for evening meal with right sided upper quadrant abdominal pain radiating to back with nausea  Abdominal ultrasound with cholelithiasis. No gallbladder wall thickening or pericholecystic fluid   No fever or leukocytosis  The patient underwent a laparoscopic cholecystectomy 2/26/2025 with perioperative findings consistent with acute cholecystitis  Was noted for an elevated bilirubin level for which she underwent additional imaging including MRI/MRCP and HIDA scan.  Fortunately no evidence of obstruction or biliary leak on imaging.  2/28/25 with bilirubin continuing to trend up, the patient was transferred to [facility] for an ERCP.  Unfortunately, due to her anatomy they were unable to cannulate her CBD.    3/2/2025: Bilirubin trending down currently at 4.56  Patient is deemed safe for discharge home  Obtain CMP in 2 to 3 days after discharge  Instructed to follow-up with PCP with appointment already scheduled, general surgery

## 2025-03-02 NOTE — ASSESSMENT & PLAN NOTE
"Had outpatient CTA head and neck, noted 2.5 cm right lung mass  CT chest on current admission reveals a \"3 cm right upper lobe mass abutting the pleura with several pleural tags compatible with primary lung cancer.\"  Appreciate pulmonology input.  Plan for a PET scan, mass biopsy, PFTs outpatient  Plan of care discussed with the patient and her  at bedside  "

## 2025-03-02 NOTE — NURSING NOTE
AVS reviewed virtually with patient and her spouse. Both parties verbalized understanding of same, including new prescribed medications and side effects, recommended follow-up appointments, and reasons to seek medical assistance. All questions answered.

## 2025-03-02 NOTE — DISCHARGE INSTR - AVS FIRST PAGE
Follow-up with Dr. Salcido in approximately 2 weeks.  Please call make an appointment.    Regular diet as tolerated.    Low intense activity as tolerated, no lifting, nothing greater than than 10 pounds for 1 week.    Please obtain repeat blood work in the next 2 to 3 days after discharge.    If develop fever, nausea vomiting, worsening abdominal pain, redness or drainage to the incisions then call the office first before going to the ER.

## 2025-03-02 NOTE — PLAN OF CARE
Problem: PAIN - ADULT  Goal: Verbalizes/displays adequate comfort level or baseline comfort level  Description: Interventions:  - Encourage patient to monitor pain and request assistance  - Assess pain using appropriate pain scale  - Administer analgesics based on type and severity of pain and evaluate response  - Implement non-pharmacological measures as appropriate and evaluate response  - Consider cultural and social influences on pain and pain management  - Notify physician/advanced practitioner if interventions unsuccessful or patient reports new pain  Outcome: Progressing     Problem: INFECTION - ADULT  Goal: Absence or prevention of progression during hospitalization  Description: INTERVENTIONS:  - Assess and monitor for signs and symptoms of infection  - Monitor lab/diagnostic results  - Monitor all insertion sites, i.e. indwelling lines, tubes, and drains  - Monitor endotracheal if appropriate and nasal secretions for changes in amount and color  - [place] appropriate cooling/warming therapies per order  - Administer medications as ordered  - Instruct and encourage patient and family to use good hand hygiene technique  - Identify and instruct in appropriate isolation precautions for identified infection/condition  Outcome: Progressing     Problem: SAFETY ADULT  Goal: Patient will remain free of falls  Description: INTERVENTIONS:  - Educate patient/family on patient safety including physical limitations  - Instruct patient to call for assistance with activity   - Consult OT/PT to assist with strengthening/mobility   - Keep Call bell within reach  - Keep bed low and locked with side rails adjusted as appropriate  - Keep care items and personal belongings within reach  - Initiate and maintain comfort rounds  - Make Fall Risk Sign visible to staff  - Offer Toileting every 2 Hours, in advance of need  - Initiate/Maintain bed/chair alarm  - Obtain necessary fall risk management equipment: non slip socks  -  Apply yellow socks and bracelet for high fall risk patients  - Consider moving patient to room near nurses station  Outcome: Progressing  Goal: Maintain or return to baseline ADL function  Description: INTERVENTIONS:  -  Assess patient's ability to carry out ADLs; assess patient's baseline for ADL function and identify physical deficits which impact ability to perform ADLs (bathing, care of mouth/teeth, toileting, grooming, dressing, etc.)  - Assess/evaluate cause of self-care deficits   - Assess range of motion  - Assess patient's mobility; develop plan if impaired  - Assess patient's need for assistive devices and provide as appropriate  - Encourage maximum independence but intervene and supervise when necessary  - Involve family in performance of ADLs  - Assess for home care needs following discharge   - Consider OT consult to assist with ADL evaluation and planning for discharge  - Provide patient education as appropriate  Outcome: Progressing  Goal: Maintains/Returns to pre admission functional level  Description: INTERVENTIONS:  - Perform AM-PAC 6 Click Basic Mobility/ Daily Activity assessment daily.  - Set and communicate daily mobility goal to care team and patient/family/caregiver.   - Collaborate with rehabilitation services on mobility goals if consulted  - Perform Range of Motion 3 times a day.  - Reposition patient every 2 hours.  - Dangle patient 3 times a day  - Stand patient 3 times a day  - Ambulate patient 3 times a day  - Out of bed to chair 3 times a day   - Out of bed for meals 3 times a day  - Out of bed for toileting  - Record patient progress and toleration of activity level   Outcome: Progressing     Problem: DISCHARGE PLANNING  Goal: Discharge to home or other facility with appropriate resources  Description: INTERVENTIONS:  - Identify barriers to discharge w/patient and caregiver  - Arrange for needed discharge resources and transportation as appropriate  - Identify discharge learning  needs (meds, wound care, etc.)  - Arrange for interpretive services to assist at discharge as needed  - Refer to Case Management Department for coordinating discharge planning if the patient needs post-hospital services based on physician/advanced practitioner order or complex needs related to functional status, cognitive ability, or social support system  Outcome: Progressing     Problem: Knowledge Deficit  Goal: Patient/family/caregiver demonstrates understanding of disease process, treatment plan, medications, and discharge instructions  Description: Complete learning assessment and assess knowledge base.  Interventions:  - Provide teaching at level of understanding  - Provide teaching via preferred learning methods  Outcome: Progressing

## 2025-03-02 NOTE — ASSESSMENT & PLAN NOTE
Noted postoperatively, most likely reactive  There was concern for a biliary leak, however reassuring results on both MRI/MRCP and HIDA scan  With bilirubin continuing to uptrend, the patient was transferred to [facility] for ERCP on 2/28/25. Unable to canalize CBD due to anatomy  Fortunately, bilirubin has trended down to 4.56  Will need to monitor bilirubin level outpatient

## 2025-03-02 NOTE — DISCHARGE SUMMARY
"Discharge Summary - Hospitalist   Name: Ashley Phillips 69 y.o. female I MRN: [id number]  Unit/Bed#: 404-01 I Date of Admission: 2/25/2025   Date of Service: 3/2/2025 I Hospital Day: 4     Assessment & Plan  Biliary colic  Presented from home after eating a cheeseburger and fries for evening meal with right sided upper quadrant abdominal pain radiating to back with nausea  Abdominal ultrasound with cholelithiasis. No gallbladder wall thickening or pericholecystic fluid   No fever or leukocytosis  The patient underwent a laparoscopic cholecystectomy 2/26/2025 with perioperative findings consistent with acute cholecystitis  Was noted for an elevated bilirubin level for which she underwent additional imaging including MRI/MRCP and HIDA scan.  Fortunately no evidence of obstruction or biliary leak on imaging.  2/28/25 with bilirubin continuing to trend up, the patient was transferred to [facility] for an ERCP.  Unfortunately, due to her anatomy they were unable to cannulate her CBD.    3/2/2025: Bilirubin trending down currently at 4.56  Patient is deemed safe for discharge home  Obtain CMP in 2 to 3 days after discharge  Instructed to follow-up with PCP with appointment already scheduled, general surgery  Hypertensive crisis  Most likely due to acute illness with blood pressure improved  Continue home regimen  Lung mass  Had outpatient CTA head and neck, noted 2.5 cm right lung mass  CT chest on current admission reveals a \"3 cm right upper lobe mass abutting the pleura with several pleural tags compatible with primary lung cancer.\"  Appreciate pulmonology input.  Plan for a PET scan, mass biopsy, PFTs outpatient  Plan of care discussed with the patient and her  at bedside  Hypothyroidism  Continue levothyroxine   Thrombocytopenia (HCC)  Chronic thrombocytopenia   Evaluated by heme-onc in the outpatient setting and felt this is most likely ITP   Platelets are stable at 99 at time of " discharge  Splenomegaly  Noted on imaging  To address with heme-onc outpatient  Primary osteoarthritis of right hip  Scheduled for hip replacement in near future  Elevated bilirubin  Noted postoperatively, most likely reactive  There was concern for a biliary leak, however reassuring results on both MRI/MRCP and HIDA scan  With bilirubin continuing to uptrend, the patient was transferred to [facility] for ERCP on 25. Unable to canalize CBD due to anatomy  Fortunately, bilirubin has trended down to 4.56  Will need to monitor bilirubin level outpatient  Other constipation  Initiate on bowel regimen     Medical Problems       Resolved Problems  Date Reviewed: 2025   None       Discharging Physician / Practitioner: Adelina Peña MD  PCP: Sulaiman Hernandez DO  Admission Date:   Admission Orders (From admission, onward)       Ordered        25 1433  INPATIENT ADMISSION  Once            25 2304  Place in Observation  Once                          Discharge Date: 25    Consultations During Hospital Stay:  General surgery, GI    Procedures Performed:   OPERATIVE REPORT  PATIENT NAME: Ashley Phillips    :  1955  MRN: [id number]  Pt Location: [facility] OR ROOM 01     SURGERY DATE: 2025     Surgeons and Role:     * Eduard Salcido DO - Primary     * Eduard Randolph MD - Assisting     Preop Diagnosis:  Cholelithiasis [K80.20]  Biliary colic [K80.50]     Post-Op Diagnosis Codes:     * Cholelithiasis [K80.20]     * Biliary colic [K80.50]     Procedure(s):  CHOLECYSTECTOMY LAPAROSCOPIC; REPAIR OF SUPRAUMBILICAL HERNIA         FL ERCP biliary only   Final Result by Catracho Fox DO ( [id number])      Fluoroscopic guidance provided for ERCP. No contrast opacification of the CBD detected.      Please see procedure report for further details.         Workstation performed: [id number]         NM hepatobiliary   Final Result by Hector Javier MD ( [id number])      1. Hepatic retention of  radiotracer without evidence of biliary excretion. Findings may be related to biliary obstruction or hepatocellular dysfunction. Evaluation for bile leak is therefore limited without biliary excretion.      The study was marked in EPIC for immediate notification.      Workstation performed: [id number]         MRI abdomen wo contrast and mrcp   Final Result by Diana Rosario MD (02/27 1050)   Fluid and gas in the gallbladder fossa, with differential as above. Given significant elevation in the total bilirubin, biliary leak is likely. To exclude an active biliary leak, consider HIDA scan if clinically warranted.   Hepatomegaly. Hepatic steatosis.   Significant splenomegaly. It seems out of proportion to hepatocellular disease. Correlate to exclude hematologic pathology.   Trace pleural fluid and bibasal atelectatic changes.               Workstation performed: [id number]         CT chest wo contrast   Final Result by Adamaris Reyes MD (02/26 0822)      3 cm right upper lobe mass abutting the pleura with several pleural tags compatible with primary lung cancer.      No bone metastases although CT can be insensitive for the detection of early bone metastases.      Mild ectasia of the ascending aorta at 4.0 cm. Recommend follow-up with a chest CT with no contrast in 1 year.      Mild splenomegaly at 14.2 cm.      Cholelithiasis.               Workstation performed: [id number]         XR chest portable   Final Result by Adamaris Reyes MD (02/26 0707)      No acute cardiopulmonary disease.      Redemonstration of 3 cm right upper lobe mass. See subsequent chest CT.            Workstation performed: [id number]         US right upper quadrant   Final Result by Sammy Gallegos MD (02/25 2144)      Cholelithiasis. No gallbladder wall thickening or pericholecystic fluid.      Mild hepatomegaly and hepatic steatosis.      Workstation performed: [id number]               Significant Findings / Test Results:    Results from last 7 days   Lab Units 03/02/25  0445   WBC Thousand/uL 5.53   HEMOGLOBIN g/dL 11.9   HEMATOCRIT % 36.6   PLATELETS Thousands/uL 99*     Results from last 7 days   Lab Units 03/02/25  0445   SODIUM mmol/L 137   CHLORIDE mmol/L 100   CO2 mmol/L 28   BUN mg/dL 11   CREATININE mg/dL 0.80   CALCIUM mg/dL 9.8   ALK PHOS U/L 174*   ALT U/L 183*   AST U/L 117*        Results from last 7 days   Lab Units 03/02/25  0445 03/01/25  0415 02/28/25  0445   BILIRUBIN DIRECT mg/dL 2.28* 3.72* 4.02*        Test Results Pending at Discharge (will require follow up):   None     Outpatient Tests Requested:  CMP    Complications:  elevated bilirubin level    Reason for Admission: biliary colic    Hospital Course:   Ashley Phillips is a 69 y.o. female patient who originally presented to the hospital on 2/25/2025 due to biliary colic.  She underwent a cholecystectomy on 2/26/2025, tolerating procedure well.  The following day the patient was noted for an elevated bilirubin for which she underwent an MRI/MRCP and HIDA evaluations.  While there was no radiologic evidence of biliary leak or obstruction, given persistently elevated bilirubin, GI was consulted and the decision was made for the patient to undergo an ERCP for which she was transferred to [facility].  As above, the patient had ERCP on 2/28/2025 however, due to her anatomy, her common bile duct was not able to be cannulized.  Fortunately, her bilirubin level has decreased adequately and the patient was deemed appropriate for discharge home to continue monitoring of her bilirubin on outpatient level.  Should there be concern with her laboratory findings she may require an outpatient ERCP.  In regards to patient's lung mass which was initially found prior to her admission and again demonstrated on current imaging, she was evaluated by pulmonology.  The patient is scheduled outpatient testing as above to address this finding and establish diagnosis and next  "steps in treatment.  The patient was discharged in improved and stable condition.  Plan of care was discussed with her and her  at bedside, they verbalized understanding.      Please see above list of diagnoses and related plan for additional information.     Condition at Discharge: good    Discharge Day Visit / Exam:   Subjective: Patient reports feeling well and is pleased with her discharge home.  She is tolerating diet and reports overall improvement.    Vitals: Blood Pressure: 149/75 (03/02/25 0839)  Pulse: 72 (03/02/25 0839)  Temperature: 97.9 °F (36.6 °C) (03/02/25 0724)  Temp Source: Oral (03/01/25 2117)  Respirations: 18 (03/02/25 0724)  Height: 5' 3\" (160 cm) (02/28/25 1242)  Weight - Scale: 62.6 kg (138 lb) (02/28/25 1242)  SpO2: 97 % (03/02/25 0844)  Physical Exam  Constitutional:       General: She is not in acute distress.  Eyes:      Conjunctiva/sclera: Conjunctivae normal.   Cardiovascular:      Rate and Rhythm: Normal rate and regular rhythm.   Pulmonary:      Effort: No respiratory distress.      Breath sounds: No wheezing or rales.   Abdominal:      General: There is no distension.      Tenderness: There is abdominal tenderness. There is no guarding.   Musculoskeletal:      Right lower leg: No edema.      Left lower leg: No edema.   Skin:     General: Skin is warm and dry.   Neurological:      Mental Status: She is oriented to person, place, and time.   Psychiatric:         Mood and Affect: Mood normal.          Discussion with Family: Updated  () at bedside.    Discharge instructions/Information to patient and family:   See after visit summary for information provided to patient and family.      Provisions for Follow-Up Care:  See after visit summary for information related to follow-up care and any pertinent home health orders.      Mobility at time of Discharge:   Basic Mobility Inpatient Raw Score: 24  JH-HLM Goal: 8: Walk 250 feet or more  JH-HLM Achieved: 8: Walk " 250 feet ot more  HLM Goal achieved. Continue to encourage appropriate mobility.     Disposition:   Home    Planned Readmission: No    Discharge Medications:  See after visit summary for reconciled discharge medications provided to patient and/or family.      Administrative Statements   Discharge Statement:  I have spent a total time of 37 minutes in caring for this patient on the day of the visit/encounter. >30 minutes of time was spent on: Diagnostic results, Counseling / Coordination of care, Documenting in the medical record, Reviewing / ordering tests, medicine, procedures  , and Communicating with other healthcare professionals .    **Please Note: This note may have been constructed using a voice recognition system**

## 2025-03-02 NOTE — ASSESSMENT & PLAN NOTE
Ashley Phillips is a 69 y.o. female s/p 2/26 lap adrianne for cholecystitis    Postoperative course complicated by hyperbilirubinemia.   Hemoglobin stable.  Total bilirubin continues to trend down as does LFTs continue to trend down.  I suspect honestly this is just all hepatic congestion and biliary stasis. Unfortunately unsuccessful ERCP during inpatient stay.  Abdomen pains soft, appropriately tender.  Tolerating diet.      HIDA scan showed hepatic retention of radiotracer without evidence of biliary excretion, findings may related to biliary obstruction or parasellar dysfunction, evaluation for bile leak is therefore limited without biliary excretion.    MRI/MRCP:  Fluid and gas in the gallbladder fossa, with differential as above. Given significant elevation in the total bilirubin, biliary leak is likely. To exclude an active biliary leak, consider HIDA scan if clinically warranted.  Hepatomegaly. Hepatic steatosis.  Significant splenomegaly. It seems out of proportion to hepatocellular disease. Correlate to exclude hematologic pathology.  Trace pleural fluid and bibasal atelectatic changes       PLAN:   -Diet as tolerated  -Surgically stable to be discharged today  -Follow-up LFTs and total bilirubin as an outpatient next 2 to 3 days  -outpatient follow-up with GI  -Follow-up with general surgery in 2 weeks.

## 2025-03-02 NOTE — PROGRESS NOTES
Progress Note - Surgery-General   Name: Ashley Phillips 69 y.o. female I MRN: [id number]  Unit/Bed#: 404-01 I Date of Admission: 2/25/2025   Date of Service: 3/2/2025 I Hospital Day: 4    Assessment & Plan  Biliary colic  Ashley Phillips is a 69 y.o. female s/p 2/26 lap adrianne for cholecystitis    Postoperative course complicated by hyperbilirubinemia.   Hemoglobin stable.  Total bilirubin continues to trend down as does LFTs continue to trend down.  I suspect honestly this is just all hepatic congestion and biliary stasis. Unfortunately unsuccessful ERCP during inpatient stay.  Abdomen pains soft, appropriately tender.  Tolerating diet.      HIDA scan showed hepatic retention of radiotracer without evidence of biliary excretion, findings may related to biliary obstruction or parasellar dysfunction, evaluation for bile leak is therefore limited without biliary excretion.    MRI/MRCP:  Fluid and gas in the gallbladder fossa, with differential as above. Given significant elevation in the total bilirubin, biliary leak is likely. To exclude an active biliary leak, consider HIDA scan if clinically warranted.  Hepatomegaly. Hepatic steatosis.  Significant splenomegaly. It seems out of proportion to hepatocellular disease. Correlate to exclude hematologic pathology.  Trace pleural fluid and bibasal atelectatic changes       PLAN:   -Diet as tolerated  -Surgically stable to be discharged today  -Follow-up LFTs and total bilirubin as an outpatient next 2 to 3 days  -outpatient follow-up with GI  -Follow-up with general surgery in 2 weeks.    Hypothyroidism  Continue p.o. levothyroxine, management by medicine service.  Thrombocytopenia (HCC)  Patient followed by Dr. Roberto Fratamico hematologist from [facility].  Platelet count here 108.  Hypertensive crisis  Managed by attending primary service.  BP on arrival 206/100.  IV hydralazine, continue as needed.  Continue to monitor blood pressure.  Lung  mass  Discovered, confirmed on CT scan chest.  Right upper lobe mass 3 cm.  Current smoker, 3 cigarettes/day.  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  Denies hemoptysis,  notes occasional dry cough.  Recently transitioned from ACE inhibitor to ARB because of cough.  Patient claims weight unchanged.  Primary osteoarthritis of right hip  Patient with degenerative joint disease right hip, scheduled for right total hip arthroplasty on April 14 in [place] by Dr. Anurag Talamantes.    Splenomegaly    Elevated bilirubin    Other constipation      I have discussed the above management plan in detail with the primary service.     Subjective   Overall doing well.  Tolerating diet.  Complains of some abdominal discomfort associate with the incisions.  No nausea vomiting no fevers or chills.    Objective :  Temp:  [97.5 °F (36.4 °C)-99.1 °F (37.3 °C)] 97.9 °F (36.6 °C)  HR:  [61-92] 72  BP: (141-171)/() 149/75  Resp:  [14-18] 18  SpO2:  [95 %-98 %] 97 %  O2 Device: None (Room air)    I/O         02/28 0701  03/01 0700 03/01 0701  03/02 0700 03/02 0701  03/03 0700    P.O. 240 840 120    I.V. (mL/kg) 900 (14.4)      Total Intake(mL/kg) 1140 (18.2) 840 (13.4) 120 (1.9)    Net +1140 +840 +120           Unmeasured Urine Occurrence 1 x              Physical Exam  Constitutional:       General: She is not in acute distress.     Appearance: Normal appearance. She is not ill-appearing, toxic-appearing or diaphoretic.   Eyes:      General: Scleral icterus present.   Abdominal:      General: There is no distension.      Palpations: There is no mass.      Tenderness: Tenderness: Appropriately tender around incisions.      Hernia: No hernia is present.   Neurological:      Mental Status: She is alert.           Lab Results: I have reviewed the following results:  Recent Labs     03/01/25  0415 03/02/25  0445   WBC 3.49* 5.53   HGB 10.5* 11.9   HCT 32.2* 36.6   PLT 77* 99*   SODIUM 138 137   K 3.8 4.3    100   CO2 27 28   BUN  11 11   CREATININE 0.74 0.80   GLUC 114 111   MG 2.0  --    * 117*   * 183*   ALB 3.7 4.2   TBILI 6.32* 4.56*   ALKPHOS 148* 174*       Imaging Results Review: No pertinent imaging studies reviewed.  Other Study Results Review: Other studies reviewed include: CBC and CMP    VTE Pharmacologic Prophylaxis: Enoxaparin (Lovenox)  VTE Mechanical Prophylaxis: sequential compression device

## 2025-03-02 NOTE — ASSESSMENT & PLAN NOTE
Patient followed by Dr. Roberto Fratamico hematologist from [facility].  Platelet count here 108.

## 2025-03-02 NOTE — PLAN OF CARE
Problem: PAIN - ADULT  Goal: Verbalizes/displays adequate comfort level or baseline comfort level  Description: Interventions:  - Encourage patient to monitor pain and request assistance  - Assess pain using appropriate pain scale  - Administer analgesics based on type and severity of pain and evaluate response  - Implement non-pharmacological measures as appropriate and evaluate response  - Consider cultural and social influences on pain and pain management  - Notify physician/advanced practitioner if interventions unsuccessful or patient reports new pain  Outcome: Progressing     Problem: INFECTION - ADULT  Goal: Absence or prevention of progression during hospitalization  Description: INTERVENTIONS:  - Assess and monitor for signs and symptoms of infection  - Monitor lab/diagnostic results  - Monitor all insertion sites, i.e. indwelling lines, tubes, and drains  - Monitor endotracheal if appropriate and nasal secretions for changes in amount and color  - [place] appropriate cooling/warming therapies per order  - Administer medications as ordered  - Instruct and encourage patient and family to use good hand hygiene technique  - Identify and instruct in appropriate isolation precautions for identified infection/condition  Outcome: Progressing     Problem: SAFETY ADULT  Goal: Patient will remain free of falls  Description: INTERVENTIONS:  - Educate patient/family on patient safety including physical limitations  - Instruct patient to call for assistance with activity   - Consult OT/PT to assist with strengthening/mobility   - Keep Call bell within reach  - Keep bed low and locked with side rails adjusted as appropriate  - Keep care items and personal belongings within reach  - Initiate and maintain comfort rounds  - Make Fall Risk Sign visible to staff  - Offer Toileting every 2 Hours, in advance of need  - Initiate/Maintain bed/chair alarm  - Obtain necessary fall risk management equipment: non slip socks  -  Apply yellow socks and bracelet for high fall risk patients  - Consider moving patient to room near nurses station  Outcome: Progressing  Goal: Maintain or return to baseline ADL function  Description: INTERVENTIONS:  -  Assess patient's ability to carry out ADLs; assess patient's baseline for ADL function and identify physical deficits which impact ability to perform ADLs (bathing, care of mouth/teeth, toileting, grooming, dressing, etc.)  - Assess/evaluate cause of self-care deficits   - Assess range of motion  - Assess patient's mobility; develop plan if impaired  - Assess patient's need for assistive devices and provide as appropriate  - Encourage maximum independence but intervene and supervise when necessary  - Involve family in performance of ADLs  - Assess for home care needs following discharge   - Consider OT consult to assist with ADL evaluation and planning for discharge  - Provide patient education as appropriate  Outcome: Progressing  Goal: Maintains/Returns to pre admission functional level  Description: INTERVENTIONS:  - Perform AM-PAC 6 Click Basic Mobility/ Daily Activity assessment daily.  - Set and communicate daily mobility goal to care team and patient/family/caregiver.   - Collaborate with rehabilitation services on mobility goals if consulted  - Perform Range of Motion 3 times a day.  - Reposition patient every 2 hours.  - Dangle patient 3 times a day  - Stand patient 3 times a day  - Ambulate patient 3 times a day  - Out of bed to chair 3 times a day   - Out of bed for meals 3 times a day  - Out of bed for toileting  - Record patient progress and toleration of activity level   Outcome: Progressing     Problem: DISCHARGE PLANNING  Goal: Discharge to home or other facility with appropriate resources  Description: INTERVENTIONS:  - Identify barriers to discharge w/patient and caregiver  - Arrange for needed discharge resources and transportation as appropriate  - Identify discharge learning  needs (meds, wound care, etc.)  - Arrange for interpretive services to assist at discharge as needed  - Refer to Case Management Department for coordinating discharge planning if the patient needs post-hospital services based on physician/advanced practitioner order or complex needs related to functional status, cognitive ability, or social support system  Outcome: Progressing     Problem: Knowledge Deficit  Goal: Patient/family/caregiver demonstrates understanding of disease process, treatment plan, medications, and discharge instructions  Description: Complete learning assessment and assess knowledge base.  Interventions:  - Provide teaching at level of understanding  - Provide teaching via preferred learning methods  Outcome: Progressing

## 2025-03-03 ENCOUNTER — TRANSITIONAL CARE MANAGEMENT (OUTPATIENT)
Dept: FAMILY MEDICINE CLINIC | Facility: CLINIC | Age: 70
End: 2025-03-03

## 2025-03-04 ENCOUNTER — OFFICE VISIT (OUTPATIENT)
Dept: FAMILY MEDICINE CLINIC | Facility: CLINIC | Age: 70
End: 2025-03-04
Payer: MEDICARE

## 2025-03-04 ENCOUNTER — TELEPHONE (OUTPATIENT)
Dept: FAMILY MEDICINE CLINIC | Facility: CLINIC | Age: 70
End: 2025-03-04

## 2025-03-04 ENCOUNTER — APPOINTMENT (OUTPATIENT)
Dept: LAB | Facility: MEDICAL CENTER | Age: 70
End: 2025-03-04
Payer: MEDICARE

## 2025-03-04 VITALS
DIASTOLIC BLOOD PRESSURE: 80 MMHG | SYSTOLIC BLOOD PRESSURE: 134 MMHG | HEART RATE: 59 BPM | WEIGHT: 136.4 LBS | TEMPERATURE: 97.9 F | BODY MASS INDEX: 24.17 KG/M2 | OXYGEN SATURATION: 99 % | HEIGHT: 63 IN

## 2025-03-04 DIAGNOSIS — K81.0 ACUTE CHOLECYSTITIS: Primary | ICD-10-CM

## 2025-03-04 DIAGNOSIS — M1A.0721 CHRONIC IDIOPATHIC GOUT INVOLVING TOE OF LEFT FOOT WITH TOPHUS: ICD-10-CM

## 2025-03-04 DIAGNOSIS — E89.0 POSTOPERATIVE HYPOTHYROIDISM: ICD-10-CM

## 2025-03-04 DIAGNOSIS — Z85.850 HISTORY OF THYROID CANCER: ICD-10-CM

## 2025-03-04 DIAGNOSIS — R17 ELEVATED BILIRUBIN: ICD-10-CM

## 2025-03-04 DIAGNOSIS — I10 ESSENTIAL HYPERTENSION: ICD-10-CM

## 2025-03-04 DIAGNOSIS — M16.11 PRIMARY OSTEOARTHRITIS OF RIGHT HIP: ICD-10-CM

## 2025-03-04 DIAGNOSIS — E03.9 ACQUIRED HYPOTHYROIDISM: ICD-10-CM

## 2025-03-04 DIAGNOSIS — E55.9 VITAMIN D DEFICIENCY: ICD-10-CM

## 2025-03-04 DIAGNOSIS — K80.20 CHOLELITHIASIS: ICD-10-CM

## 2025-03-04 DIAGNOSIS — R91.8 LUNG MASS: ICD-10-CM

## 2025-03-04 PROBLEM — K80.50 BILIARY COLIC: Status: RESOLVED | Noted: 2025-02-26 | Resolved: 2025-03-04

## 2025-03-04 PROBLEM — R51.9 RECURRENT OCCIPITAL HEADACHE: Status: RESOLVED | Noted: 2025-02-21 | Resolved: 2025-03-04

## 2025-03-04 LAB
25(OH)D3 SERPL-MCNC: 53.2 NG/ML (ref 30–100)
ALBUMIN SERPL BCG-MCNC: 4.3 G/DL (ref 3.5–5)
ALP SERPL-CCNC: 163 U/L (ref 34–104)
ALT SERPL W P-5'-P-CCNC: 166 U/L (ref 7–52)
ANION GAP SERPL CALCULATED.3IONS-SCNC: 12 MMOL/L (ref 4–13)
AST SERPL W P-5'-P-CCNC: 89 U/L (ref 13–39)
BILIRUB SERPL-MCNC: 2.61 MG/DL (ref 0.2–1)
BUN SERPL-MCNC: 17 MG/DL (ref 5–25)
CALCIUM SERPL-MCNC: 10.1 MG/DL (ref 8.4–10.2)
CHLORIDE SERPL-SCNC: 101 MMOL/L (ref 96–108)
CO2 SERPL-SCNC: 28 MMOL/L (ref 21–32)
CREAT SERPL-MCNC: 0.71 MG/DL (ref 0.6–1.3)
GFR SERPL CREATININE-BSD FRML MDRD: 86 ML/MIN/1.73SQ M
GLUCOSE P FAST SERPL-MCNC: 105 MG/DL (ref 65–99)
POTASSIUM SERPL-SCNC: 5.1 MMOL/L (ref 3.5–5.3)
PROT SERPL-MCNC: 7.2 G/DL (ref 6.4–8.4)
SODIUM SERPL-SCNC: 141 MMOL/L (ref 135–147)
TSH SERPL DL<=0.05 MIU/L-ACNC: 11.42 UIU/ML (ref 0.45–4.5)
URATE SERPL-MCNC: 4.7 MG/DL (ref 2–7.5)

## 2025-03-04 PROCEDURE — 84550 ASSAY OF BLOOD/URIC ACID: CPT

## 2025-03-04 PROCEDURE — 82306 VITAMIN D 25 HYDROXY: CPT

## 2025-03-04 PROCEDURE — 99496 TRANSJ CARE MGMT HIGH F2F 7D: CPT | Performed by: INTERNAL MEDICINE

## 2025-03-04 PROCEDURE — 80053 COMPREHEN METABOLIC PANEL: CPT

## 2025-03-04 PROCEDURE — 84443 ASSAY THYROID STIM HORMONE: CPT

## 2025-03-04 PROCEDURE — 88304 TISSUE EXAM BY PATHOLOGIST: CPT | Performed by: PATHOLOGY

## 2025-03-04 PROCEDURE — 36415 COLL VENOUS BLD VENIPUNCTURE: CPT

## 2025-03-04 NOTE — PROGRESS NOTES
Transition of Care Visit  Name: Ashley Phillips      : 1955      MRN: [id number]  Encounter Provider: Sulaiman Hernandez DO  Encounter Date: 3/4/2025   Encounter department: [facility]    Assessment & Plan  Acute cholecystitis  Patient is here for transition of care appointment.  Reviewed all relevant data from the hospitalization including consultations, lab tests and x-rays.  He reviewed all his current medications and reconcile them.    Seems to be recovering well at this point.  Awaiting labs.       Elevated bilirubin  Awaiting labs to reveal declining bilirubin hopefully.       Essential hypertension  Patient blood pressure appears well-controlled.  Continue current medical therapy.  Continue to monitor home blood pressures.        Lung mass  Has follow-up with pulmonary, has PFTs and a PET scan ordered.       Acquired hypothyroidism  Remains on replacement periodic monitoring.       Primary osteoarthritis of right hip  Will hold off on hip surgery at this point.  This will be delayed until after her pulmonary issues have been worked out.            History of Present Illness     Transitional Care Management Review:   Ashley Phillips is a 69 y.o. female here for TCM follow up.     During the TCM phone call patient stated:  TCM Call     Date and time call was made  3/3/2025  8:10 AM    Hospital care reviewed  Records reviewed    Patient was hospitialized at  [facility]    Date of Admission  25    Date of discharge  25    Diagnosis  Biliary colic      TCM Call     Should patient be enrolled in anticoag monitoring?  No    Did you obtain your prescribed medications  Yes    Do you need help managing your prescriptions or medications  No    Is transportation to your appointment needed  No    Living Arrangements  Spouse or Significiant other    Are you recieving any outpatient services  No    Are you recieving home care services  No    Are you using any community  resources  No    Current waiver services  No    Have you fallen in the last 12 months  No    Interperter language line needed  No    Counseling  Patient        Here for hospital follow-up.  Required urgent surgery for acute cholecystitis.  Marked elevated bilirubin, slow to come down.  Transferred down to [facility] where an ERCP was attempted.  Unfortunately unable to cannulate the common bile duct due to anatomical abnormality.  Her bilirubin seem to trend downward so it was thought safe to be discharged.  Currently her pain is reasonably controlled.  Eating well.  No fevers.  Has had no headaches.  Blood pressure has been reasonably controlled.    Her last appointment here was for uncontrolled blood pressure, and she was to have a right hip surgery and she was trying to get this under control before the hip.  Given acute illness as well as abnormality on her chest x-ray, we will be delaying her hip surgery.    No other recent changes in medication.  Blood pressure now reasonly controlled on current regimen.  No significant edema associated with the addition of amlodipine.  Currently on no antibiotics.  Had blood work done today, labs are pending.      Review of Systems   Constitutional:  Negative for chills and fever.   HENT:  Negative for ear pain and sore throat.    Eyes:  Negative for pain and visual disturbance.   Respiratory:  Negative for cough and shortness of breath.    Cardiovascular:  Negative for chest pain and palpitations.   Gastrointestinal:  Positive for abdominal pain. Negative for vomiting.   Genitourinary:  Negative for dysuria and hematuria.   Musculoskeletal:  Positive for arthralgias and back pain.   Skin:  Negative for color change and rash.   Neurological:  Negative for seizures and syncope.   All other systems reviewed and are negative.    Objective   /80 (BP Location: Left arm, Patient Position: Sitting, Cuff Size: Adult)   Pulse 59   Temp 97.9 °F (36.6 °C) (Tympanic)    "Ht 5' 3\" (1.6 m)   Wt 61.9 kg (136 lb 6.4 oz)   SpO2 99%   BMI 24.16 kg/m²     Physical Exam  Vitals and nursing note reviewed.   Constitutional:       General: She is not in acute distress.     Appearance: Normal appearance. She is well-developed.   HENT:      Head: Normocephalic and atraumatic.   Eyes:      Conjunctiva/sclera: Conjunctivae normal.   Cardiovascular:      Rate and Rhythm: Normal rate and regular rhythm.      Pulses: Normal pulses.      Heart sounds: Normal heart sounds. No murmur heard.  Pulmonary:      Effort: Pulmonary effort is normal. No respiratory distress.      Breath sounds: Normal breath sounds.   Abdominal:      Palpations: Abdomen is soft.      Tenderness: There is no abdominal tenderness.   Musculoskeletal:         General: No swelling.      Cervical back: Neck supple.   Skin:     General: Skin is warm and dry.      Capillary Refill: Capillary refill takes less than 2 seconds.      Comments: Surgical wounds healing well, no drainage or cellulitis.   Neurological:      General: No focal deficit present.      Mental Status: She is alert and oriented to person, place, and time.   Psychiatric:         Mood and Affect: Mood normal.       Medications have been reviewed by provider in current encounter      "

## 2025-03-04 NOTE — ASSESSMENT & PLAN NOTE
Patient blood pressure appears well-controlled.  Continue current medical therapy.  Continue to monitor home blood pressures.

## 2025-03-04 NOTE — TELEPHONE ENCOUNTER
Per Dr. Hernandez.  Patient needs to postpone her surgery that is scheduled for 04/14/2025 with Dr. Anurag Talamantes due to recent surgery and medical problems.  I called [facility] and spoke to Stacey with the info and she will forward the message.

## 2025-03-04 NOTE — ASSESSMENT & PLAN NOTE
Will hold off on hip surgery at this point.  This will be delayed until after her pulmonary issues have been worked out.

## 2025-03-05 ENCOUNTER — TELEPHONE (OUTPATIENT)
Age: 70
End: 2025-03-05

## 2025-03-05 DIAGNOSIS — E03.9 ACQUIRED HYPOTHYROIDISM: Primary | ICD-10-CM

## 2025-03-05 NOTE — TELEPHONE ENCOUNTER
Pt called in, stated she spoke with Olivia previously and wanted to update the provider. Pt states she didn't have her medication(levothyroxine 100 mcg tablet) for 4-days while in for surgery. Pt thinks that might have some relation on what's going on. PCP please be aware and further advise pt.

## 2025-03-06 ENCOUNTER — TELEPHONE (OUTPATIENT)
Age: 70
End: 2025-03-06

## 2025-03-06 ENCOUNTER — TELEPHONE (OUTPATIENT)
Dept: RADIOLOGY | Facility: HOSPITAL | Age: 70
End: 2025-03-06

## 2025-03-06 NOTE — TELEPHONE ENCOUNTER
Went over patients appointments with her and scheduled a f/u for patient after testing. No further questions or concerns

## 2025-03-06 NOTE — TELEPHONE ENCOUNTER
Wilton called in for his wife , they received a call to schedule Biposy pt was offered an appointment on the 3/20/25 . Pt  schedule  on 3/19/2025 for her PET scan  , Patient would like to know if she did wrong by scheduling it after her PET scan ( April 4,2025 )  .Pt will like to know if she should have the procedure done on the 20th . Patient wasn't sure if the provider needed to have her results from her PET .   Please advise

## 2025-03-06 NOTE — TELEPHONE ENCOUNTER
Called pt to schedule Lung BX, offered sooner spot at another campus, pt is having a PET on 3/19 and waiting for results

## 2025-03-11 DIAGNOSIS — I10 ESSENTIAL HYPERTENSION: ICD-10-CM

## 2025-03-11 RX ORDER — ATENOLOL 50 MG/1
50 TABLET ORAL 2 TIMES DAILY
Qty: 180 TABLET | Refills: 1 | Status: SHIPPED | OUTPATIENT
Start: 2025-03-11

## 2025-03-12 ENCOUNTER — HOSPITAL ENCOUNTER (OUTPATIENT)
Dept: PULMONOLOGY | Facility: HOSPITAL | Age: 70
Discharge: HOME | End: 2025-03-12
Attending: INTERNAL MEDICINE
Payer: MEDICARE

## 2025-03-12 DIAGNOSIS — R91.8 LUNG MASS: ICD-10-CM

## 2025-03-12 PROCEDURE — 94010 BREATHING CAPACITY TEST: CPT | Performed by: INTERNAL MEDICINE

## 2025-03-12 PROCEDURE — 94760 N-INVAS EAR/PLS OXIMETRY 1: CPT

## 2025-03-12 PROCEDURE — 94726 PLETHYSMOGRAPHY LUNG VOLUMES: CPT

## 2025-03-12 PROCEDURE — 94729 DIFFUSING CAPACITY: CPT

## 2025-03-12 PROCEDURE — 94010 BREATHING CAPACITY TEST: CPT

## 2025-03-12 PROCEDURE — 94726 PLETHYSMOGRAPHY LUNG VOLUMES: CPT | Performed by: INTERNAL MEDICINE

## 2025-03-12 PROCEDURE — 94729 DIFFUSING CAPACITY: CPT | Performed by: INTERNAL MEDICINE

## 2025-03-14 ENCOUNTER — TELEPHONE (OUTPATIENT)
Age: 70
End: 2025-03-14

## 2025-03-14 NOTE — TELEPHONE ENCOUNTER
Patient of Dr Salcido s/p CHOLECYSTECTOMY LAPAROSCOPIC; REPAIR OF SUPRAUMBILICAL HERNIA on 2/26 and ERCP on 2/28.    Patient states she has been having sharp LLQ pain and rectal pressure intermittently for the past few days. She stated that she will have a BM after and it subsides but not always.  She is no longer taking narcotics, but continues with Miralax daily.   I advised her to hold the Miralax and see how she does without it.    Denies fever, n/v or blood in stool. Incision sites are well healing.    Patient wanted to let Dr Salcido know to see if he had any recommendations.  Post op visit in on 3/20.    Please advise  #[phone]

## 2025-03-19 ENCOUNTER — HOSPITAL ENCOUNTER (OUTPATIENT)
Dept: RADIOLOGY | Age: 70
Discharge: HOME | End: 2025-03-19
Payer: MEDICARE

## 2025-03-19 DIAGNOSIS — R91.8 LUNG MASS: ICD-10-CM

## 2025-03-19 DIAGNOSIS — D38.1 NEOPLASM OF UNCERTAIN BEHAVIOR OF RIGHT UPPER LOBE OF LUNG: ICD-10-CM

## 2025-03-19 LAB — GLUCOSE SERPL-MCNC: 83 MG/DL (ref 65–140)

## 2025-03-19 PROCEDURE — 82948 REAGENT STRIP/BLOOD GLUCOSE: CPT

## 2025-03-19 PROCEDURE — 78815 PET IMAGE W/CT SKULL-THIGH: CPT

## 2025-03-19 PROCEDURE — A9552 F18 FDG: HCPCS

## 2025-03-19 NOTE — LETTER
[facility]  [address] [place] [address]      March 26, 2025    MRN: [id number]     Phone: [phone]     Dear Ms. Phillips,    Adams recently had a(n) Nuclear Medicine performed on 3/19/2025 at  [facility] that was requested by Nevaeh Burton DO. The study was reviewed by a radiologist, which is a physician who specializes in medical imaging. The radiologist issued a report describing his or her findings. In that report there was a finding that the radiologist felt warranted further discussion with your health care provider and that discussion would be beneficial to you.      The results were sent to Nevaeh Burton DO on 03/21/2025  9:12 AM. We recommend that you contact Nevaeh Burton DO at [phone] or set up an appointment to discuss the results of the imaging test. If you have already heard from Nevaeh Burton DO regarding the results of your study, you can disregard this letter.     This letter is not meant to alarm you, but intended to encourage you to follow-up on your results with the provider that sent you for the imaging study. In addition, we have enclosed answers to frequently asked questions by other patients who have also received a letter to review results with their health care provider (see page two).      Thank you for choosing [facility] for your medical imaging needs.                                                                                                                                                        FREQUENTLY ASKED QUESTIONS    Why am I receiving this letter?  [place] State Law requires us to notify patients who have findings on imaging exams that may require more testing or follow-up with a health professional within the next 3 months.        How serious is the finding on the imaging test?  This letter is sent to all patients who may need follow-up or more testing within the next 3 months.   Receiving this letter does not necessarily mean you have a life-threatening imaging finding or disease.  Recommendations in the radiologist’s imaging report are general in nature and it is up to your healthcare provider to say whether those recommendations make sense for your situation.  You are strongly encouraged to talk to your health care provider about the results and ask whether additional steps need to be taken.    Where can I get a copy of the final report for my recent radiology exam?  To get a full copy of the report you can access your records online at [email or web address] or please contact [facility]’s Medical Records Department at [phone] Monday through Friday between 8 am and 6 pm.         What do I need to do now?           Please contact your health care provider who requested the imaging study to discuss what further actions (if any) are needed.  You may have already heard from (your ordering provider) in regard to this test in which case you can disregard this letter.        NOTICE IN ACCORDANCE WITH THE [place] STATE “PATIENT TEST RESULT INFORMATION ACT OF 2018”    You are receiving this notice as a result of a determination by your diagnostic imaging service that further discussions of your test results are warranted and would be beneficial to you.    The complete results of your test or tests have been or will be sent to the health care practitioner that ordered the test or tests. It is recommended that you contact your health care practitioner to discuss your results as soon as possible.

## 2025-03-20 ENCOUNTER — OFFICE VISIT (OUTPATIENT)
Dept: SURGERY | Facility: CLINIC | Age: 70
End: 2025-03-20
Payer: MEDICARE

## 2025-03-20 VITALS
HEIGHT: 63 IN | OXYGEN SATURATION: 98 % | HEART RATE: 93 BPM | SYSTOLIC BLOOD PRESSURE: 122 MMHG | BODY MASS INDEX: 23.74 KG/M2 | TEMPERATURE: 98.3 F | DIASTOLIC BLOOD PRESSURE: 74 MMHG | WEIGHT: 134 LBS

## 2025-03-20 DIAGNOSIS — K80.20 CHOLELITHIASIS: ICD-10-CM

## 2025-03-20 DIAGNOSIS — K80.50 BILIARY COLIC: ICD-10-CM

## 2025-03-20 DIAGNOSIS — R17 ELEVATED BILIRUBIN: ICD-10-CM

## 2025-03-20 DIAGNOSIS — R91.8 LUNG MASS: Primary | ICD-10-CM

## 2025-03-20 DIAGNOSIS — K42.9 UMBILICAL HERNIA WITHOUT OBSTRUCTION AND WITHOUT GANGRENE: ICD-10-CM

## 2025-03-20 PROCEDURE — 99212 OFFICE O/P EST SF 10 MIN: CPT | Performed by: SURGERY

## 2025-03-20 NOTE — ASSESSMENT & PLAN NOTE
Patient with a known right sided lung mass found incidentally on workup for gallbladder symptoms on most recent admission.  Patient obtained PET scan yesterday.  No official read yet.  Patient is scheduled for biopsy with IR in the coming week.

## 2025-03-20 NOTE — ASSESSMENT & PLAN NOTE
Most recent bilirubin that was done in the hospital shows that it is trending down.  Patient is completely asymptomatic and doing well.  MRCP and HIDA scan were both negative although HIDA scan did not show any significant excretion from the liver.  ERCP was unsuccessful in cannulating the duct.  Will repeat CMP to ensure normalization of the bilirubin.  Patient currently again asymptomatic tolerating diet.  I will call her with results

## 2025-03-20 NOTE — ASSESSMENT & PLAN NOTE
Status post laparoscopic cholecystectomy.  Complicated by hyperbilirubinemia of unknown etiology.  MRCP negative for any CBD injury or biloma.  HIDA scan negative for bile leak but there was minimal to no excretion from the liver.  ERCP was unsuccessful.  Etiology could be a potential Greer Bears syndrome exacerbated by surgery or potentially just biliary stasis exacerbated by surgery.  Will repeat CMP to ensure bilirubin returns normal.  Patient clinically doing very well.  Pathology reviewed and discussed in the office.  I will call patient with results.    Orders:    Ambulatory Referral to General Surgery    Comprehensive metabolic panel; Future

## 2025-03-20 NOTE — PROGRESS NOTES
Name: Ashley Phillips      : 1955      MRN: [id number]  Encounter Provider: Eduard Salcido DO  Encounter Date: 3/20/2025   Encounter department: [facility]  :  Assessment & Plan  Cholelithiasis    Orders:    Ambulatory Referral to General Surgery    Comprehensive metabolic panel; Future    Biliary colic  Status post laparoscopic cholecystectomy.  Complicated by hyperbilirubinemia of unknown etiology.  MRCP negative for any CBD injury or biloma.  HIDA scan negative for bile leak but there was minimal to no excretion from the liver.  ERCP was unsuccessful.  Etiology could be a potential Greer Bears syndrome exacerbated by surgery or potentially just biliary stasis exacerbated by surgery.  Will repeat CMP to ensure bilirubin returns normal.  Patient clinically doing very well.  Pathology reviewed and discussed in the office.  I will call patient with results.    Orders:    Ambulatory Referral to General Surgery    Comprehensive metabolic panel; Future    Lung mass  Patient with a known right sided lung mass found incidentally on workup for gallbladder symptoms on most recent admission.  Patient obtained PET scan yesterday.  No official read yet.  Patient is scheduled for biopsy with IR in the coming week.         Elevated bilirubin  Most recent bilirubin that was done in the hospital shows that it is trending down.  Patient is completely asymptomatic and doing well.  MRCP and HIDA scan were both negative although HIDA scan did not show any significant excretion from the liver.  ERCP was unsuccessful in cannulating the duct.  Will repeat CMP to ensure normalization of the bilirubin.  Patient currently again asymptomatic tolerating diet.  I will call her with results         Umbilical hernia without obstruction and without gangrene  Post repair during laparoscopic cholecystectomy.  Advised patient to avoid heavy lifting for an additional 3 weeks.             History of Present Illness    ANDRE Phillips is a 70 y.o. female who presents today for postop check after undergoing laparoscopic cholecystectomy and subsequent repair of umbilical hernia.  Doing well.  No nausea vomit.  No fever chills.  Tolerating diet.  No changes in bowel bladder habits.  Patient underwent PET scan as a workup for lung mass yesterday.  She is scheduled for biopsy next week.  History obtained from: patient    Review of Systems  Medical History Reviewed by provider this encounter:     .  Past Medical History   Past Medical History:   Diagnosis Date    Chronic ITP (idiopathic thrombocytopenia) (HCC)     Disease of thyroid gland     Hypertension      Past Surgical History:   Procedure Laterality Date    CHOLECYSTECTOMY LAPAROSCOPIC N/A 2/26/2025    Procedure: CHOLECYSTECTOMY LAPAROSCOPIC; REPAIR OF SUPRAUMBILICAL HERNIA;  Surgeon: Eduard Salcido DO;  Location: [facility]IN OR;  Service: General    COLONOSCOPY  10/26/2017    Dr. Guevara - polyp in sigmoid colon removed; moderate diverticulosis in sigmoid colon. Bx: tubular adenoma.    COLONOSCOPY  08/03/2012    Dr. Guevara - polyp found in sigmoid colon removed. Moderately severe diverticulosis in the sigmoid colon. Bx: tubular adenoma.    HYSTERECTOMY      THYROIDECTOMY, PARTIAL Left 2004    papillary carcinoma of the thyroid    TOTAL THYROIDECTOMY Bilateral 2009    Hashimotos     Family History   Problem Relation Age of Onset    Leukemia Mother         chronic lymphocytic leukemia    Hashimoto's thyroiditis Mother     Diabetes type II Mother     Lung cancer Father     No Known Problems Daughter     No Known Problems Maternal Grandmother     No Known Problems Maternal Grandfather     No Known Problems Paternal Grandmother     No Known Problems Paternal Grandfather     No Known Problems Brother     No Known Problems Son     No Known Problems Maternal Aunt     No Known Problems Paternal Aunt     Breast cancer Cousin       reports that she quit smoking about 43 years  "ago. Her smoking use included cigarettes. She has been exposed to tobacco smoke. She has never used smokeless tobacco. She reports that she does not drink alcohol and does not use drugs.  Current Outpatient Medications   Medication Instructions    allopurinol (ZYLOPRIM) 100 mg, Oral, Daily    amLODIPine (NORVASC) 2.5 mg, Oral, Daily    atenolol (TENORMIN) 50 mg, Oral, 2 times daily    Biotin 1000 MCG CHEW Daily    cyanocobalamin (VITAMIN B-12) 1,000 mcg, Daily    levothyroxine 100 mcg tablet take 1 tablet by mouth 6 days  PER WEEK, 1/2 TABELT ONE DAY A WEEK    oxyCODONE-acetaminophen (PERCOCET) 5-325 mg per tablet 1 tablet, Oral, Every 4 hours PRN    valsartan (DIOVAN) 160 mg, Oral, Daily    Vitamin D3 2,000 Units, Daily     Allergies   Allergen Reactions    Ciprofloxacin Lip Swelling    Erythromycin Other (See Comments)     \"jaundice\"    Penicillins Swelling      Current Outpatient Medications on File Prior to Visit   Medication Sig Dispense Refill    allopurinol (ZYLOPRIM) 100 mg tablet TAKE 1 TABLET BY MOUTH EVERY DAY 90 tablet 1    amLODIPine (NORVASC) 2.5 mg tablet Take 1 tablet (2.5 mg total) by mouth daily 100 tablet 3    atenolol (TENORMIN) 50 mg tablet TAKE 1 TABLET BY MOUTH TWICE A  tablet 1    Biotin 1000 MCG CHEW Chew daily      Cholecalciferol (Vitamin D3) 50 MCG (2000 UT) TABS Take 2,000 Units by mouth daily      cyanocobalamin (VITAMIN B-12) 1000 MCG tablet Take 1,000 mcg by mouth daily      levothyroxine 100 mcg tablet take 1 tablet by mouth 6 days  PER WEEK, 1/2 TABELT ONE DAY A WEEK      oxyCODONE-acetaminophen (PERCOCET) 5-325 mg per tablet Take 1 tablet by mouth every 4 (four) hours as needed for moderate pain or severe pain Max Daily Amount: 6 tablets 20 tablet 0    valsartan (DIOVAN) 160 mg tablet Take 1 tablet (160 mg total) by mouth daily 100 tablet 3     No current facility-administered medications on file prior to visit.      Social History     Tobacco Use    Smoking status: Former " "    Current packs/day: 0.00     Types: Cigarettes     Quit date:      Years since quittin.2     Passive exposure: Past    Smokeless tobacco: Never   Vaping Use    Vaping status: Never Used   Substance and Sexual Activity    Alcohol use: Never    Drug use: Never    Sexual activity: Not on file        Objective   /74 (BP Location: Left arm, Patient Position: Sitting, Cuff Size: Standard)   Pulse 93   Temp 98.3 °F (36.8 °C) (Temporal)   Ht 5' 3\" (1.6 m)   Wt 60.8 kg (134 lb)   SpO2 98%   BMI 23.74 kg/m²      Physical Exam  Vitals reviewed.   Constitutional:       General: She is not in acute distress.     Appearance: Normal appearance. She is not ill-appearing, toxic-appearing or diaphoretic.   HENT:      Head: Normocephalic and atraumatic.   Abdominal:      General: There is no distension.      Palpations: Abdomen is soft. There is no mass.      Tenderness: There is no abdominal tenderness.      Hernia: No hernia is present.      Comments: Incisions clean dry and intact.   Neurological:      Mental Status: She is alert.           "

## 2025-03-20 NOTE — ASSESSMENT & PLAN NOTE
Post repair during laparoscopic cholecystectomy.  Advised patient to avoid heavy lifting for an additional 3 weeks.

## 2025-03-21 ENCOUNTER — DOCUMENTATION (OUTPATIENT)
Dept: PULMONOLOGY | Facility: CLINIC | Age: 70
End: 2025-03-21

## 2025-03-21 ENCOUNTER — PREP FOR PROCEDURE (OUTPATIENT)
Dept: PULMONOLOGY | Facility: CLINIC | Age: 70
End: 2025-03-21

## 2025-03-21 ENCOUNTER — TELEPHONE (OUTPATIENT)
Dept: PULMONOLOGY | Facility: CLINIC | Age: 70
End: 2025-03-21

## 2025-03-21 DIAGNOSIS — R91.8 LUNG MASS: Primary | ICD-10-CM

## 2025-03-21 RX ORDER — ALBUTEROL SULFATE 0.83 MG/ML
2.5 SOLUTION RESPIRATORY (INHALATION) ONCE
OUTPATIENT
Start: 2025-03-21

## 2025-03-21 NOTE — PROGRESS NOTES
Interventional Pulmonary/ Imaging review    Ashley Phillips70 y.o.female  MRN [id number]    At the request of Dr. Burton, imaging was reviewed to determine if appropriate to pursue tissue sampling.     CT of the chest performed on 2/26/25 shows 3 cm RUL mass.    PET scan performed on 3/19/25 shows SUV 3.1 in RUL mass.  No mediastinal or distant uptake.    Based on imaging, it is reasonable to move forward with navigational bronchoscopy with endobronchial ultrasound.  This will allow us to sample the lesion as well as mediastinal lymph nodes given that mass is 3 cm in size, mediastinal staging is important.    Patient will be scheduled for next available OR block, tentatively 4/1 or 4/8.    Anticoagulation: None    Diabetes meds: None    Sera Aceves D.O., [facility]

## 2025-03-21 NOTE — TELEPHONE ENCOUNTER
Dr. LAWRENCE will be performing a NICK BRONCH on Ashley Phillips on 04/08/2025    LOCATION: B    ANTICOAGULATION INSTRUCTIONS: NONE    OTHER MEDICATION INSTRUCTIONS: NONE    LABS: (CBC/PT/PTT in last 90 days): 03/02/2025   (If no, labs ordered / to be done at least one day prior to procedure)    LAST OFFICE NOTE/H&P within 30 days of procedure: N/A REQUESTED BY     INSTRUCTIONS GIVEN: Spoke with patient advised of procedure date and location. Patient aware she is to be NPO after midnight the night prior and will need a . Patient advised she will receive a call the day before with time of arrival.    Thank You   Alie Bowers

## 2025-03-24 ENCOUNTER — APPOINTMENT (OUTPATIENT)
Dept: LAB | Facility: MEDICAL CENTER | Age: 70
End: 2025-03-24
Payer: MEDICARE

## 2025-03-24 ENCOUNTER — RESULTS FOLLOW-UP (OUTPATIENT)
Dept: FAMILY MEDICINE CLINIC | Facility: CLINIC | Age: 70
End: 2025-03-24

## 2025-03-24 DIAGNOSIS — K80.20 CHOLELITHIASIS: ICD-10-CM

## 2025-03-24 DIAGNOSIS — K80.50 BILIARY COLIC: ICD-10-CM

## 2025-03-24 DIAGNOSIS — E03.9 ACQUIRED HYPOTHYROIDISM: ICD-10-CM

## 2025-03-24 LAB
ALBUMIN SERPL BCG-MCNC: 4.6 G/DL (ref 3.5–5)
ALP SERPL-CCNC: 96 U/L (ref 34–104)
ALT SERPL W P-5'-P-CCNC: 26 U/L (ref 7–52)
ANION GAP SERPL CALCULATED.3IONS-SCNC: 12 MMOL/L (ref 4–13)
AST SERPL W P-5'-P-CCNC: 28 U/L (ref 13–39)
BILIRUB SERPL-MCNC: 1.17 MG/DL (ref 0.2–1)
BUN SERPL-MCNC: 16 MG/DL (ref 5–25)
CALCIUM SERPL-MCNC: 10.2 MG/DL (ref 8.4–10.2)
CHLORIDE SERPL-SCNC: 104 MMOL/L (ref 96–108)
CO2 SERPL-SCNC: 26 MMOL/L (ref 21–32)
CREAT SERPL-MCNC: 0.92 MG/DL (ref 0.6–1.3)
GFR SERPL CREATININE-BSD FRML MDRD: 62 ML/MIN/1.73SQ M
GLUCOSE P FAST SERPL-MCNC: 98 MG/DL (ref 65–99)
POTASSIUM SERPL-SCNC: 4.9 MMOL/L (ref 3.5–5.3)
PROT SERPL-MCNC: 7.1 G/DL (ref 6.4–8.4)
SODIUM SERPL-SCNC: 142 MMOL/L (ref 135–147)
T4 FREE SERPL-MCNC: 1.22 NG/DL (ref 0.61–1.12)
TSH SERPL DL<=0.05 MIU/L-ACNC: 2.04 UIU/ML (ref 0.45–4.5)

## 2025-03-24 PROCEDURE — 84439 ASSAY OF FREE THYROXINE: CPT

## 2025-03-24 PROCEDURE — 84443 ASSAY THYROID STIM HORMONE: CPT

## 2025-03-24 PROCEDURE — 80053 COMPREHEN METABOLIC PANEL: CPT

## 2025-03-24 PROCEDURE — 36415 COLL VENOUS BLD VENIPUNCTURE: CPT

## 2025-03-25 ENCOUNTER — RESULTS FOLLOW-UP (OUTPATIENT)
Dept: OTHER | Facility: HOSPITAL | Age: 70
End: 2025-03-25

## 2025-03-25 ENCOUNTER — TELEPHONE (OUTPATIENT)
Dept: INTERVENTIONAL RADIOLOGY/VASCULAR | Facility: HOSPITAL | Age: 70
End: 2025-03-25

## 2025-03-31 ENCOUNTER — TELEPHONE (OUTPATIENT)
Age: 70
End: 2025-03-31

## 2025-03-31 NOTE — TELEPHONE ENCOUNTER
Spoke to Pacheco Miller and  the patient will still need to come in for appointment on 04/07/2025

## 2025-03-31 NOTE — TELEPHONE ENCOUNTER
Pt called in to clarify her appointment on 4/7/2025 needs to be cancel . Patient stated she spoke with  after her PET scan and was told her Biopsy 4/4/2025 over at [facility] was cancel and The Patient received a call and was schedule for Kevin Bronch EBUS  for 4/8/25. Please clarify to the patient if her appointment needs to be cancel on 4/7/25 or that patient should be seen

## 2025-04-07 ENCOUNTER — APPOINTMENT (OUTPATIENT)
Dept: LAB | Facility: HOSPITAL | Age: 70
End: 2025-04-07
Payer: MEDICARE

## 2025-04-07 ENCOUNTER — OFFICE VISIT (OUTPATIENT)
Dept: PULMONOLOGY | Facility: CLINIC | Age: 70
End: 2025-04-07
Payer: MEDICARE

## 2025-04-07 VITALS
HEIGHT: 63 IN | BODY MASS INDEX: 24.1 KG/M2 | DIASTOLIC BLOOD PRESSURE: 84 MMHG | TEMPERATURE: 98.1 F | OXYGEN SATURATION: 97 % | WEIGHT: 136 LBS | SYSTOLIC BLOOD PRESSURE: 152 MMHG | HEART RATE: 79 BPM

## 2025-04-07 DIAGNOSIS — D69.3 AUTOIMMUNE THROMBOCYTOPENIA (HCC): ICD-10-CM

## 2025-04-07 DIAGNOSIS — R91.8 LUNG MASS: Primary | ICD-10-CM

## 2025-04-07 LAB
ERYTHROCYTE [DISTWIDTH] IN BLOOD BY AUTOMATED COUNT: 13.5 % (ref 11.6–15.1)
HCT VFR BLD AUTO: 37.6 % (ref 34.8–46.1)
HGB BLD-MCNC: 12.5 G/DL (ref 11.5–15.4)
MCH RBC QN AUTO: 30.3 PG (ref 26.8–34.3)
MCHC RBC AUTO-ENTMCNC: 33.2 G/DL (ref 31.4–37.4)
MCV RBC AUTO: 91 FL (ref 82–98)
PLATELET # BLD AUTO: 105 THOUSANDS/UL (ref 149–390)
PMV BLD AUTO: 10.8 FL (ref 8.9–12.7)
RBC # BLD AUTO: 4.12 MILLION/UL (ref 3.81–5.12)
WBC # BLD AUTO: 5.61 THOUSAND/UL (ref 4.31–10.16)

## 2025-04-07 PROCEDURE — 99214 OFFICE O/P EST MOD 30 MIN: CPT | Performed by: PHYSICIAN ASSISTANT

## 2025-04-07 PROCEDURE — 36415 COLL VENOUS BLD VENIPUNCTURE: CPT

## 2025-04-07 PROCEDURE — 85027 COMPLETE CBC AUTOMATED: CPT

## 2025-04-07 NOTE — ASSESSMENT & PLAN NOTE
Reviewed results of PET/CT and PFTs  with patient and her  today. She has 3 cm RUL nodule that is highly concerning for malignancy give her smoking history. Her PFTs are normal.   Proceed with bronchoscopy for airway survey, navigational robotic- assisted bronchoscopy with tissue sampling to include any of the following sampling modalities:  fine-needle aspiration, forceps and/or cryoprobe biopsy, brushing and bronchoalveolar lavage, followed by endobronchial ultrasound with lymph node sampling.  Patient understands the risks and benefits.  Specifically, we discussed there is a 3-5% risk of pneumothorax and 1% risk of significant bleeding related to the procedure.  Additional potential risks include asthma/COPD exacerbation, atelectasis and pneumonia.  They understand that diagnostic yield is generally 90%.  In the event of a nondiagnostic procedure, patient understands additional testing and follow-up may be necessary.  Patient verbalizes understanding and consent has been signed.  We will plan to perform postprocedure chest x-ray prior to discharge.  I will call her with results.   Orders:    Ambulatory referral to Pulmonology

## 2025-04-07 NOTE — H&P (VIEW-ONLY)
Follow-up  Visit - Pulmonary Medicine   Name: Ashley Phillips      : 1955      MRN: [id number]  Encounter Provider: Pacheco Miller PA-C  Encounter Date: 2025   Encounter department: [facility] [place]  :  Assessment & Plan  Lung mass  Reviewed results of PET/CT and PFTs  with patient and her  today. She has 3 cm RUL nodule that is highly concerning for malignancy give her smoking history. Her PFTs are normal.   Proceed with bronchoscopy for airway survey, navigational robotic- assisted bronchoscopy with tissue sampling to include any of the following sampling modalities:  fine-needle aspiration, forceps and/or cryoprobe biopsy, brushing and bronchoalveolar lavage, followed by endobronchial ultrasound with lymph node sampling.  Patient understands the risks and benefits.  Specifically, we discussed there is a 3-5% risk of pneumothorax and 1% risk of significant bleeding related to the procedure.  Additional potential risks include asthma/COPD exacerbation, atelectasis and pneumonia.  They understand that diagnostic yield is generally 90%.  In the event of a nondiagnostic procedure, patient understands additional testing and follow-up may be necessary.  Patient verbalizes understanding and consent has been signed.  We will plan to perform postprocedure chest x-ray prior to discharge.  I will call her with results.   Orders:    Ambulatory referral to Pulmonology    Autoimmune thrombocytopenia (HCC)  Will ask Ashley to check CBC with platelets for completeness sake prior to her procedure tomorrow.   Orders:    CBC and Platelet; Future      No follow-ups on file.    History of Present Illness   Ashley Phillips is a 70 y.o. female who presents today for routine follow-up to go over PET/CT, PFTs, discuss biopsy of 3 cm right upper lobe nodule.  Nodule was first noted back in February when she was hospitalized for biliary colic and elective cholecystectomy.  Patient continues to  smoke a few cigarettes a day her last cigarette being yesterday.  She is actively trying to quit.  She denies respiratory symptoms.  No shortness of breath at rest or with exertion, chest pain, cough, sputum production, wheezing, chest tightness, hemoptysis.  No night sweats or abnormal weight loss.  Does not use inhalers, nebulizers or supplemental oxygen.    Review of Systems   All other systems reviewed and are negative.      Aside from what is mentioned in the HPI, ROS is otherwise negative    Past Medical History   Past Medical History:   Diagnosis Date    Chronic ITP (idiopathic thrombocytopenia) (HCC)     Disease of thyroid gland     Hypertension      Past Surgical History:   Procedure Laterality Date    CHOLECYSTECTOMY LAPAROSCOPIC N/A 2/26/2025    Procedure: CHOLECYSTECTOMY LAPAROSCOPIC; REPAIR OF SUPRAUMBILICAL HERNIA;  Surgeon: Eduard Salcido DO;  Location: [facility];  Service: General    COLONOSCOPY  10/26/2017    Dr. Guevara - polyp in sigmoid colon removed; moderate diverticulosis in sigmoid colon. Bx: tubular adenoma.    COLONOSCOPY  08/03/2012    Dr. Guevara - polyp found in sigmoid colon removed. Moderately severe diverticulosis in the sigmoid colon. Bx: tubular adenoma.    HYSTERECTOMY      THYROIDECTOMY, PARTIAL Left 2004    papillary carcinoma of the thyroid    TOTAL THYROIDECTOMY Bilateral 2009    Hashimotos     Family History   Problem Relation Age of Onset    Leukemia Mother         chronic lymphocytic leukemia    Hashimoto's thyroiditis Mother     Diabetes type II Mother     Lung cancer Father     No Known Problems Daughter     No Known Problems Maternal Grandmother     No Known Problems Maternal Grandfather     No Known Problems Paternal Grandmother     No Known Problems Paternal Grandfather     No Known Problems Brother     No Known Problems Son     No Known Problems Maternal Aunt     No Known Problems Paternal Aunt     Breast cancer Cousin       reports that she quit smoking  "about 43 years ago. Her smoking use included cigarettes. She has been exposed to tobacco smoke. She has never used smokeless tobacco. She reports that she does not drink alcohol and does not use drugs.  Current Outpatient Medications   Medication Instructions    allopurinol (ZYLOPRIM) 100 mg, Oral, Daily    amLODIPine (NORVASC) 2.5 mg, Oral, Daily    atenolol (TENORMIN) 50 mg, Oral, 2 times daily    Biotin 1000 MCG CHEW Daily    cyanocobalamin (VITAMIN B-12) 1,000 mcg, Daily    levothyroxine 100 mcg tablet take 1 tablet by mouth 6 days  PER WEEK, 1/2 TABELT ONE DAY A WEEK    oxyCODONE-acetaminophen (PERCOCET) 5-325 mg per tablet 1 tablet, Oral, Every 4 hours PRN    valsartan (DIOVAN) 160 mg, Oral, Daily    Vitamin D3 2,000 Units, Daily     Allergies   Allergen Reactions    Ciprofloxacin Lip Swelling    Erythromycin Other (See Comments)     \"jaundice\"    Penicillins Swelling         Medical History Reviewed by provider this encounter:     .    Objective   /84 (BP Location: Left arm, Patient Position: Sitting, Cuff Size: Standard)   Pulse 79   Temp 98.1 °F (36.7 °C) (Temporal)   Ht 5' 3\" (1.6 m)   Wt 61.7 kg (136 lb)   SpO2 97%   BMI 24.09 kg/m²     Physical Exam  Vitals reviewed.   Constitutional:       Appearance: Normal appearance. She is well-developed.   HENT:      Head: Normocephalic and atraumatic.      Nose: Nose normal.      Mouth/Throat:      Mouth: Mucous membranes are moist.   Eyes:      Extraocular Movements: Extraocular movements intact.   Cardiovascular:      Rate and Rhythm: Normal rate and regular rhythm.      Heart sounds: Normal heart sounds.   Pulmonary:      Effort: Pulmonary effort is normal. No respiratory distress.      Breath sounds: No wheezing, rhonchi or rales.   Musculoskeletal:         General: No swelling.   Skin:     General: Skin is warm and dry.   Neurological:      Mental Status: She is alert. Mental status is at baseline.   Psychiatric:         Mood and Affect: Mood " normal.         Behavior: Behavior normal.       Diagnostic Data:  Labs: I personally reviewed the most recent laboratory data pertinent to today's visit.    Radiology results:  Radiology Results Review: I personally reviewed the following image studies in PACS and associated radiology reports: PET-CT and CT chest. My interpretation of the radiology images/reports is: as below.    PET/CT 3/19/2025  IMPRESSION:  1.  Focal FDG uptake in the right upper lobe mass concerning for primary lung malignancy.  2.  No findings for hypermetabolic metastasis to the neck or chest.  3.  Prominent focus of increased radiotracer uptake at the distal sigmoid colon. A colonic lesion should be excluded. Correlate with colonoscopy.  4.  Mildly increased overall uptake in the enlarged spleen, nonspecific. Patient does have history of autoimmune thrombocytopenia which could explain these findings.  5.  Persistent fluid collection at the gallbladder fossa similar size to prior imaging.    CT chest without contrast 2/26/2025  3 cm right upper lobe mass abutting the pleura with several pleural tags.  Mild ectasia of the ascending aorta at 4 cm.    PFT/spirometry results:  PFT 3/12/2025  Ratio 81%  FEV1 114% predicted  % predicted  TLC 93% predicted  RV 81% predicted  DLCO corrected for patient's hemoglobin 94% predicted  Normal spirometry.  Bronchodilator not administered.  Normal lung volumes.  Normal diffusion capacity.    Pacheco Miller PA-C

## 2025-04-07 NOTE — PROGRESS NOTES
Follow-up  Visit - Pulmonary Medicine   Name: Ashley Phillips      : 1955      MRN: [id number]  Encounter Provider: Pacheco Miller PA-C  Encounter Date: 2025   Encounter department: [facility] [place]  :  Assessment & Plan  Lung mass  Reviewed results of PET/CT and PFTs  with patient and her  today. She has 3 cm RUL nodule that is highly concerning for malignancy give her smoking history. Her PFTs are normal.   Proceed with bronchoscopy for airway survey, navigational robotic- assisted bronchoscopy with tissue sampling to include any of the following sampling modalities:  fine-needle aspiration, forceps and/or cryoprobe biopsy, brushing and bronchoalveolar lavage, followed by endobronchial ultrasound with lymph node sampling.  Patient understands the risks and benefits.  Specifically, we discussed there is a 3-5% risk of pneumothorax and 1% risk of significant bleeding related to the procedure.  Additional potential risks include asthma/COPD exacerbation, atelectasis and pneumonia.  They understand that diagnostic yield is generally 90%.  In the event of a nondiagnostic procedure, patient understands additional testing and follow-up may be necessary.  Patient verbalizes understanding and consent has been signed.  We will plan to perform postprocedure chest x-ray prior to discharge.  I will call her with results.   Orders:    Ambulatory referral to Pulmonology    Autoimmune thrombocytopenia (HCC)  Will ask Ashley to check CBC with platelets for completeness sake prior to her procedure tomorrow.   Orders:    CBC and Platelet; Future      No follow-ups on file.    History of Present Illness   Ashley Phillips is a 70 y.o. female who presents today for routine follow-up to go over PET/CT, PFTs, discuss biopsy of 3 cm right upper lobe nodule.  Nodule was first noted back in February when she was hospitalized for biliary colic and elective cholecystectomy.  Patient continues to  smoke a few cigarettes a day her last cigarette being yesterday.  She is actively trying to quit.  She denies respiratory symptoms.  No shortness of breath at rest or with exertion, chest pain, cough, sputum production, wheezing, chest tightness, hemoptysis.  No night sweats or abnormal weight loss.  Does not use inhalers, nebulizers or supplemental oxygen.    Review of Systems   All other systems reviewed and are negative.      Aside from what is mentioned in the HPI, ROS is otherwise negative    Past Medical History   Past Medical History:   Diagnosis Date    Chronic ITP (idiopathic thrombocytopenia) (HCC)     Disease of thyroid gland     Hypertension      Past Surgical History:   Procedure Laterality Date    CHOLECYSTECTOMY LAPAROSCOPIC N/A 2/26/2025    Procedure: CHOLECYSTECTOMY LAPAROSCOPIC; REPAIR OF SUPRAUMBILICAL HERNIA;  Surgeon: Eduard Salcido DO;  Location: [facility];  Service: General    COLONOSCOPY  10/26/2017    Dr. Guevara - polyp in sigmoid colon removed; moderate diverticulosis in sigmoid colon. Bx: tubular adenoma.    COLONOSCOPY  08/03/2012    Dr. Guevara - polyp found in sigmoid colon removed. Moderately severe diverticulosis in the sigmoid colon. Bx: tubular adenoma.    HYSTERECTOMY      THYROIDECTOMY, PARTIAL Left 2004    papillary carcinoma of the thyroid    TOTAL THYROIDECTOMY Bilateral 2009    Hashimotos     Family History   Problem Relation Age of Onset    Leukemia Mother         chronic lymphocytic leukemia    Hashimoto's thyroiditis Mother     Diabetes type II Mother     Lung cancer Father     No Known Problems Daughter     No Known Problems Maternal Grandmother     No Known Problems Maternal Grandfather     No Known Problems Paternal Grandmother     No Known Problems Paternal Grandfather     No Known Problems Brother     No Known Problems Son     No Known Problems Maternal Aunt     No Known Problems Paternal Aunt     Breast cancer Cousin       reports that she quit smoking  "about 43 years ago. Her smoking use included cigarettes. She has been exposed to tobacco smoke. She has never used smokeless tobacco. She reports that she does not drink alcohol and does not use drugs.  Current Outpatient Medications   Medication Instructions    allopurinol (ZYLOPRIM) 100 mg, Oral, Daily    amLODIPine (NORVASC) 2.5 mg, Oral, Daily    atenolol (TENORMIN) 50 mg, Oral, 2 times daily    Biotin 1000 MCG CHEW Daily    cyanocobalamin (VITAMIN B-12) 1,000 mcg, Daily    levothyroxine 100 mcg tablet take 1 tablet by mouth 6 days  PER WEEK, 1/2 TABELT ONE DAY A WEEK    oxyCODONE-acetaminophen (PERCOCET) 5-325 mg per tablet 1 tablet, Oral, Every 4 hours PRN    valsartan (DIOVAN) 160 mg, Oral, Daily    Vitamin D3 2,000 Units, Daily     Allergies   Allergen Reactions    Ciprofloxacin Lip Swelling    Erythromycin Other (See Comments)     \"jaundice\"    Penicillins Swelling         Medical History Reviewed by provider this encounter:     .    Objective   /84 (BP Location: Left arm, Patient Position: Sitting, Cuff Size: Standard)   Pulse 79   Temp 98.1 °F (36.7 °C) (Temporal)   Ht 5' 3\" (1.6 m)   Wt 61.7 kg (136 lb)   SpO2 97%   BMI 24.09 kg/m²     Physical Exam  Vitals reviewed.   Constitutional:       Appearance: Normal appearance. She is well-developed.   HENT:      Head: Normocephalic and atraumatic.      Nose: Nose normal.      Mouth/Throat:      Mouth: Mucous membranes are moist.   Eyes:      Extraocular Movements: Extraocular movements intact.   Cardiovascular:      Rate and Rhythm: Normal rate and regular rhythm.      Heart sounds: Normal heart sounds.   Pulmonary:      Effort: Pulmonary effort is normal. No respiratory distress.      Breath sounds: No wheezing, rhonchi or rales.   Musculoskeletal:         General: No swelling.   Skin:     General: Skin is warm and dry.   Neurological:      Mental Status: She is alert. Mental status is at baseline.   Psychiatric:         Mood and Affect: Mood " normal.         Behavior: Behavior normal.       Diagnostic Data:  Labs: I personally reviewed the most recent laboratory data pertinent to today's visit.    Radiology results:  Radiology Results Review: I personally reviewed the following image studies in PACS and associated radiology reports: PET-CT and CT chest. My interpretation of the radiology images/reports is: as below.    PET/CT 3/19/2025  IMPRESSION:  1.  Focal FDG uptake in the right upper lobe mass concerning for primary lung malignancy.  2.  No findings for hypermetabolic metastasis to the neck or chest.  3.  Prominent focus of increased radiotracer uptake at the distal sigmoid colon. A colonic lesion should be excluded. Correlate with colonoscopy.  4.  Mildly increased overall uptake in the enlarged spleen, nonspecific. Patient does have history of autoimmune thrombocytopenia which could explain these findings.  5.  Persistent fluid collection at the gallbladder fossa similar size to prior imaging.    CT chest without contrast 2/26/2025  3 cm right upper lobe mass abutting the pleura with several pleural tags.  Mild ectasia of the ascending aorta at 4 cm.    PFT/spirometry results:  PFT 3/12/2025  Ratio 81%  FEV1 114% predicted  % predicted  TLC 93% predicted  RV 81% predicted  DLCO corrected for patient's hemoglobin 94% predicted  Normal spirometry.  Bronchodilator not administered.  Normal lung volumes.  Normal diffusion capacity.    Pacheco Miller PA-C

## 2025-04-07 NOTE — ASSESSMENT & PLAN NOTE
Will ask Ashley to check CBC with platelets for completeness sake prior to her procedure tomorrow.   Orders:    CBC and Platelet; Future

## 2025-04-08 ENCOUNTER — ANESTHESIA (OUTPATIENT)
Dept: PERIOP | Facility: HOSPITAL | Age: 70
End: 2025-04-08
Payer: MEDICARE

## 2025-04-08 ENCOUNTER — ANESTHESIA EVENT (OUTPATIENT)
Dept: PERIOP | Facility: HOSPITAL | Age: 70
End: 2025-04-08
Payer: MEDICARE

## 2025-04-08 ENCOUNTER — HOSPITAL ENCOUNTER (OUTPATIENT)
Dept: RADIOLOGY | Facility: HOSPITAL | Age: 70
Setting detail: OUTPATIENT SURGERY
Discharge: HOME | End: 2025-04-08
Payer: MEDICARE

## 2025-04-08 ENCOUNTER — HOSPITAL ENCOUNTER (OUTPATIENT)
Dept: RADIOLOGY | Facility: HOSPITAL | Age: 70
Discharge: HOME | End: 2025-04-08
Payer: MEDICARE

## 2025-04-08 ENCOUNTER — HOSPITAL ENCOUNTER (OUTPATIENT)
Dept: PERIOP | Facility: HOSPITAL | Age: 70
Setting detail: OUTPATIENT SURGERY
Discharge: HOME | End: 2025-04-08
Attending: INTERNAL MEDICINE
Payer: MEDICARE

## 2025-04-08 VITALS
HEIGHT: 63 IN | RESPIRATION RATE: 18 BRPM | TEMPERATURE: 97 F | DIASTOLIC BLOOD PRESSURE: 62 MMHG | WEIGHT: 136 LBS | SYSTOLIC BLOOD PRESSURE: 106 MMHG | HEART RATE: 61 BPM | OXYGEN SATURATION: 99 % | BODY MASS INDEX: 24.1 KG/M2

## 2025-04-08 DIAGNOSIS — R91.8 LUNG MASS: ICD-10-CM

## 2025-04-08 PROCEDURE — 31641 BRONCHOSCOPY TREAT BLOCKAGE: CPT | Performed by: INTERNAL MEDICINE

## 2025-04-08 PROCEDURE — 31654 BRONCH EBUS IVNTJ PERPH LES: CPT | Performed by: INTERNAL MEDICINE

## 2025-04-08 PROCEDURE — 88112 CYTOPATH CELL ENHANCE TECH: CPT | Performed by: PATHOLOGY

## 2025-04-08 PROCEDURE — 71045 X-RAY EXAM CHEST 1 VIEW: CPT

## 2025-04-08 PROCEDURE — 88172 CYTP DX EVAL FNA 1ST EA SITE: CPT | Performed by: PATHOLOGY

## 2025-04-08 PROCEDURE — 88305 TISSUE EXAM BY PATHOLOGIST: CPT | Performed by: PATHOLOGY

## 2025-04-08 PROCEDURE — 88342 IMHCHEM/IMCYTCHM 1ST ANTB: CPT | Performed by: PATHOLOGY

## 2025-04-08 PROCEDURE — 31624 DX BRONCHOSCOPE/LAVAGE: CPT | Performed by: INTERNAL MEDICINE

## 2025-04-08 PROCEDURE — 88173 CYTOPATH EVAL FNA REPORT: CPT | Performed by: PATHOLOGY

## 2025-04-08 PROCEDURE — 88341 IMHCHEM/IMCYTCHM EA ADD ANTB: CPT | Performed by: PATHOLOGY

## 2025-04-08 PROCEDURE — 88333 PATH CONSLTJ SURG CYTO XM 1: CPT | Performed by: PATHOLOGY

## 2025-04-08 PROCEDURE — 31629 BRONCHOSCOPY/NEEDLE BX EACH: CPT | Performed by: INTERNAL MEDICINE

## 2025-04-08 PROCEDURE — 31627 NAVIGATIONAL BRONCHOSCOPY: CPT | Performed by: INTERNAL MEDICINE

## 2025-04-08 RX ORDER — FENTANYL CITRATE 50 UG/ML
INJECTION, SOLUTION INTRAMUSCULAR; INTRAVENOUS AS NEEDED
Status: DISCONTINUED | OUTPATIENT
Start: 2025-04-08 | End: 2025-04-08

## 2025-04-08 RX ORDER — FENTANYL CITRATE/PF 50 MCG/ML
50 SYRINGE (ML) INJECTION
Refills: 0 | Status: DISCONTINUED | OUTPATIENT
Start: 2025-04-08 | End: 2025-04-08 | Stop reason: HOSPADM

## 2025-04-08 RX ORDER — ONDANSETRON 2 MG/ML
INJECTION INTRAMUSCULAR; INTRAVENOUS AS NEEDED
Status: DISCONTINUED | OUTPATIENT
Start: 2025-04-08 | End: 2025-04-08

## 2025-04-08 RX ORDER — ALBUTEROL SULFATE 0.83 MG/ML
2.5 SOLUTION RESPIRATORY (INHALATION) ONCE
Status: COMPLETED | OUTPATIENT
Start: 2025-04-08 | End: 2025-04-08

## 2025-04-08 RX ORDER — SODIUM CHLORIDE, SODIUM LACTATE, POTASSIUM CHLORIDE, CALCIUM CHLORIDE 600; 310; 30; 20 MG/100ML; MG/100ML; MG/100ML; MG/100ML
125 INJECTION, SOLUTION INTRAVENOUS CONTINUOUS
Status: DISCONTINUED | OUTPATIENT
Start: 2025-04-08 | End: 2025-04-12 | Stop reason: HOSPADM

## 2025-04-08 RX ORDER — PROPOFOL 10 MG/ML
INJECTION, EMULSION INTRAVENOUS AS NEEDED
Status: DISCONTINUED | OUTPATIENT
Start: 2025-04-08 | End: 2025-04-08

## 2025-04-08 RX ORDER — PROPOFOL 10 MG/ML
INJECTION, EMULSION INTRAVENOUS CONTINUOUS PRN
Status: DISCONTINUED | OUTPATIENT
Start: 2025-04-08 | End: 2025-04-08

## 2025-04-08 RX ORDER — ROCURONIUM BROMIDE 10 MG/ML
INJECTION, SOLUTION INTRAVENOUS AS NEEDED
Status: DISCONTINUED | OUTPATIENT
Start: 2025-04-08 | End: 2025-04-08

## 2025-04-08 RX ORDER — LIDOCAINE HYDROCHLORIDE 10 MG/ML
INJECTION, SOLUTION EPIDURAL; INFILTRATION; INTRACAUDAL; PERINEURAL AS NEEDED
Status: DISCONTINUED | OUTPATIENT
Start: 2025-04-08 | End: 2025-04-08

## 2025-04-08 RX ADMIN — PROPOFOL 150 MG: 10 INJECTION, EMULSION INTRAVENOUS at 12:26

## 2025-04-08 RX ADMIN — ROCURONIUM BROMIDE 10 MG: 10 INJECTION, SOLUTION INTRAVENOUS at 12:57

## 2025-04-08 RX ADMIN — SUGAMMADEX 200 MG: 100 INJECTION, SOLUTION INTRAVENOUS at 13:33

## 2025-04-08 RX ADMIN — ROCURONIUM BROMIDE 40 MG: 10 INJECTION, SOLUTION INTRAVENOUS at 12:26

## 2025-04-08 RX ADMIN — ONDANSETRON 4 MG: 2 INJECTION INTRAMUSCULAR; INTRAVENOUS at 12:26

## 2025-04-08 RX ADMIN — PROPOFOL 50 MG: 10 INJECTION, EMULSION INTRAVENOUS at 12:29

## 2025-04-08 RX ADMIN — DEXMEDETOMIDINE HYDROCHLORIDE 4 MCG: 100 INJECTION, SOLUTION INTRAVENOUS at 12:26

## 2025-04-08 RX ADMIN — PROPOFOL 120 MCG/KG/MIN: 10 INJECTION, EMULSION INTRAVENOUS at 12:29

## 2025-04-08 RX ADMIN — LIDOCAINE HYDROCHLORIDE 50 MG: 10 INJECTION, SOLUTION EPIDURAL; INFILTRATION; INTRACAUDAL; PERINEURAL at 12:26

## 2025-04-08 RX ADMIN — FENTANYL CITRATE 50 MCG: 50 INJECTION INTRAMUSCULAR; INTRAVENOUS at 12:18

## 2025-04-08 RX ADMIN — SODIUM CHLORIDE, SODIUM LACTATE, POTASSIUM CHLORIDE, AND CALCIUM CHLORIDE 125 ML/HR: .6; .31; .03; .02 INJECTION, SOLUTION INTRAVENOUS at 10:25

## 2025-04-08 RX ADMIN — ALBUTEROL SULFATE 2.5 MG: 2.5 SOLUTION RESPIRATORY (INHALATION) at 11:47

## 2025-04-08 RX ADMIN — NOREPINEPHRINE BITARTRATE 3 MCG/MIN: 1 INJECTION, SOLUTION, CONCENTRATE INTRAVENOUS at 12:44

## 2025-04-08 RX ADMIN — FENTANYL CITRATE 50 MCG: 50 INJECTION INTRAMUSCULAR; INTRAVENOUS at 12:26

## 2025-04-08 NOTE — ANESTHESIA PREPROCEDURE EVALUATION
Procedure:  NAVIGATIONAL BRONCHOSCOPY WITH EBUS    Relevant Problems   CARDIO   (+) Essential hypertension   (+) Hypertensive crisis      ENDO   (+) Hypothyroidism      HEMATOLOGY   (+) Autoimmune thrombocytopenia (HCC)   (+) Thrombocytopenia (HCC)      MUSCULOSKELETAL   (+) Chronic right-sided low back pain with right-sided sciatica   (+) Idiopathic gout involving toe of left foot   (+) Primary osteoarthritis of right hip      NEURO/PSYCH   (+) Chronic right-sided low back pain with right-sided sciatica      Other   (+) Splenomegaly     Prior 2b view MAC3     Physical Exam    Airway    Mallampati score: I         Dental    upper dentures    Cardiovascular      Pulmonary      Other Findings  post-pubertal.      Anesthesia Plan  ASA Score- 3     Anesthesia Type- general with ASA Monitors.         Additional Monitors:     Airway Plan: ETT.    Comment: General anesthesia, endotracheal tube; standard ASA monitors. Risks and benefits discussed with patient; patient consented and agrees to proceed.    I saw and evaluated the patient. If seen with CRNA, we have discussed the anesthetic plan and I am in agreement that the plan is appropriate for the patient.  .       Plan Factors-    Chart reviewed.   Existing labs reviewed.                   Induction- intravenous.    Postoperative Plan- Plan for postoperative opioid use. Planned trial extubation        Informed Consent- Anesthetic plan and risks discussed with patient.  I personally reviewed this patient with the CRNA. Discussed and agreed on the Anesthesia Plan with the CRNA..      NPO Status:  Vitals Value Taken Time   Date of last liquid 04/08/25 04/08/25 1014   Time of last liquid 0545 04/08/25 1014   Date of last solid 04/07/25 04/08/25 1014   Time of last solid 2000 04/08/25 1014

## 2025-04-08 NOTE — ANESTHESIA POSTPROCEDURE EVALUATION
Post-Op Assessment Note    CV Status:  Stable    Pain management: adequate       Mental Status:  Alert   Hydration Status:  Stable   PONV Controlled:  None   Airway Patency:  Patent    No anethesia notable event occurred.    Staff: Anesthesiologist   Comments: uneventful post op course, did well        Last Filed PACU Vitals:  Vitals Value Taken Time   Temp 97.5 °F (36.4 °C) 04/08/25 1430   Pulse 64 04/08/25 1438   /57 04/08/25 1430   Resp 34 04/08/25 1437   SpO2 98 % 04/08/25 1438   Vitals shown include unfiled device data.    Modified Jose:     Vitals Value Taken Time   Activity 2 04/08/25 1430   Respiration 2 04/08/25 1430   Circulation 2 04/08/25 1430   Consciousness 2 04/08/25 1430   Oxygen Saturation 2 04/08/25 1430     Modified Jose Score: 10

## 2025-04-08 NOTE — INTERVAL H&P NOTE
H&P reviewed. After examining the patient I find no changes in the patients condition since the H&P had been written.      Vitals:    04/08/25 1012   BP: 113/80   Pulse: 60   Resp: 18   Temp: (!) 96.5 °F (35.8 °C)   SpO2: 100%     Proceed with navigation/EBUS. Consent signed

## 2025-04-08 NOTE — ANESTHESIA POSTPROCEDURE EVALUATION
Post-Op Assessment Note    CV Status:  Stable  Pain Score: 0    Pain management: adequate       Mental Status:  Awake and sleepy   Hydration Status:  Stable   PONV Controlled:  None   Airway Patency:  Patent     Post Op Vitals Reviewed: Yes    No anethesia notable event occurred.    Staff: Anesthesiologist, CRNA           Last Filed PACU Vitals:  Vitals Value Taken Time   Temp     Pulse 67 04/08/25 1343   /53    Resp 14    SpO2 95 % 04/08/25 1343   Vitals shown include unfiled device data.

## 2025-04-09 ENCOUNTER — RA CDI HCC (OUTPATIENT)
Dept: OTHER | Facility: HOSPITAL | Age: 70
End: 2025-04-09

## 2025-04-10 ENCOUNTER — DOCUMENTATION (OUTPATIENT)
Dept: HEMATOLOGY ONCOLOGY | Facility: CLINIC | Age: 70
End: 2025-04-10

## 2025-04-10 DIAGNOSIS — C34.91 NSCLC OF RIGHT LUNG (HCC): Primary | ICD-10-CM

## 2025-04-10 NOTE — PROGRESS NOTES
In-basket message received from Dr. Aceves to add patient to the thoracic [facility] on 4/14/2025. Chart reviewed and prep completed.

## 2025-04-10 NOTE — PROGRESS NOTES
Order placed per request of Dr. Aceves post Navigational bronchoscopy on 04/08/25. Referring provider Dr. Burton/ CHAD Miller PA-C.

## 2025-04-11 ENCOUNTER — TELEPHONE (OUTPATIENT)
Age: 70
End: 2025-04-11

## 2025-04-11 ENCOUNTER — PATIENT OUTREACH (OUTPATIENT)
Dept: HEMATOLOGY ONCOLOGY | Facility: CLINIC | Age: 70
End: 2025-04-11

## 2025-04-11 ENCOUNTER — DOCUMENTATION (OUTPATIENT)
Dept: HEMATOLOGY ONCOLOGY | Facility: CLINIC | Age: 70
End: 2025-04-11

## 2025-04-11 DIAGNOSIS — C34.11 PRIMARY ADENOCARCINOMA OF UPPER LOBE OF RIGHT LUNG (HCC): Primary | ICD-10-CM

## 2025-04-11 PROCEDURE — 88172 CYTP DX EVAL FNA 1ST EA SITE: CPT | Performed by: PATHOLOGY

## 2025-04-11 PROCEDURE — 88173 CYTOPATH EVAL FNA REPORT: CPT | Performed by: PATHOLOGY

## 2025-04-11 PROCEDURE — 88112 CYTOPATH CELL ENHANCE TECH: CPT | Performed by: PATHOLOGY

## 2025-04-11 PROCEDURE — 88333 PATH CONSLTJ SURG CYTO XM 1: CPT | Performed by: PATHOLOGY

## 2025-04-11 PROCEDURE — 88305 TISSUE EXAM BY PATHOLOGIST: CPT | Performed by: PATHOLOGY

## 2025-04-11 PROCEDURE — 88341 IMHCHEM/IMCYTCHM EA ADD ANTB: CPT | Performed by: PATHOLOGY

## 2025-04-11 PROCEDURE — 88342 IMHCHEM/IMCYTCHM 1ST ANTB: CPT | Performed by: PATHOLOGY

## 2025-04-11 NOTE — PROGRESS NOTES
Initial outreach. Spoke to patient. Introduced myself and explained the role of an Oncology Nurse Navigator to assist with coordination of cancer care, preparation for upcoming appointment, be a point of contact prior to oncology consult, provide support and connect her with available resources. Discussed Thoracic Surgery  referral placed by Dr Burton/Pacheco Miller PA-C for lung cancer. Explained I will be their main contact until they are established with their team and a treatment plan is established.     Introduced Angie, and explained she will be her patient navigator, who will reach out after the first consult to introduce themselves. The PN will provide support, make sure she has a good understanding of the plan and schedule and to connect with additional resources if/when needed.     Assessed for barriers of care. My direct contact information provided. Patient is aware that she can call me should she need any further assistance. Patient was appreciative of assistance.     Do you have a history of cancer? yes - Thyroid  s/p partial thyroidectomy then complete  Have you ever received Radiation Therapy? No  Where  did you receive RT? N/A   When? N/A  Do you have any implantable devices? No  [] Pacemaker  [] Pain stimulator  [] Defibrillator  [] Implanted sleep apnea device  [] Port             Smoking: Yes, describe: history of smoking quit in 1982 then started smoking started up again in 2017 initially 3-4 cigarettes daily  now 1-2  declined smoking cessation.   Cough/Hemoptysis/SOB: No  Pain(CP/back/rib/bone): No   Loss of appetite: No  Wt Loss No  Fatigue No  other: No      PCP: Dr. Hernandez

## 2025-04-11 NOTE — PROGRESS NOTES
All records needed are in patients chart. No records retrieval needed at this time.    Pt referred to Thoracic Surgery and should be scheduled within 7 days after 04/14/25.  Please notify me if not scheduled within time frame.    Referral received/ Chart reviewed for work up completed     Imaging completed: [facility]   [x] PET/CT 03/19/25   [] MRI   [x] CT chest wo contrast    [] US   [] Mammo   [] Bone scan   [] N/A    Pathology completed: [facility]   Date: 04/11/25   Location: [facility]    []N/A    Additional records needed:    [] Genomic report   [] Genetic testing results   [] Office Note   [x] Procedure PFT's 03/12/25   [] Lab results   [] N/A      [] Radiation Oncology records retrieval needed (PN to route to rad/onc clerical pool once scheduled)  Date:  Location:    Hx of Thyroid cancer s/p Thyroidectomy

## 2025-04-11 NOTE — TELEPHONE ENCOUNTER
Patient received a referral for Thoracic Surgery, as per review the patient needs to be seen within 7 days but after 04/14/2025.     I scheduled the patient for the first available appointment, 04/28/2025 @ 8:20AM with Dr. Kessler in [place].    Please contact the patient with a sooner appointment if possible.

## 2025-04-14 ENCOUNTER — DOCUMENTATION (OUTPATIENT)
Dept: HEMATOLOGY ONCOLOGY | Facility: CLINIC | Age: 70
End: 2025-04-14

## 2025-04-14 DIAGNOSIS — R94.8 ABNORMAL PET SCAN OF COLON: Primary | ICD-10-CM

## 2025-04-14 NOTE — PROGRESS NOTES
Called patient to review recommendations made at thoracic tumor board this afternoon.  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  Group recommendation was to proceed with thoracic surgery evaluation.  Will also refer to GI for abnormal hypermetabolic uptake in distal sigmoid on PET/CT.  She verbalized understanding and agrees to the plan.  Of note, patient has an appointment with Dr. Dominique tomorrow.

## 2025-04-14 NOTE — PROGRESS NOTES
THORACIC ONCOLOGY MULTIDISCIPLINARY CASE REVIEW    DATE:  4/14/2025    PRESENTING DOCTOR:  Pacheco Miller PA-C    DIAGNOSIS:  Adenocarcinoma RUL  STAGING: Clinical Stage IA    Ashley Phillips is a 70 y.o. female who was presented at the Thoracic Oncology Multidisciplinary Tumor Conference today. She has a past medical history of chronic ITP, thyroid cancer s/p thyroidectomy 2009, HTN and RUL nodule. Workup includes CT chest, PFT's, PET CT and navigational bronchoscopy.      PHYSICIAN RECOMMENDED PLAN:  - Referral to Thoracic Surgery  - Follow up with GI        Imaging reviewed:   03/19/25 PET CT  02/26/25 CT chest wo contrast     Pathology reviewed:   04/08/25 FNA/Biopsy/Tissue Exam    PFT's reviewed: No    Future imaging: No    Referrals: Thoracic Surgery    Clinical Trials Reviewed:  Yes - (TROPION-Lung12)  Clinical Trial Eligibility: TBD    Team agreed to plan.  NCCN guidelines were readily available for review at this discussion    The final treatment plan will be left to the discretion of the patient and the treating physician.     DISCLAIMERS:  TO THE TREATING PHYSICIAN:  This conference is a meeting of clinicians from various specialty areas who evaluate and discuss patients for whom a multidisciplinary treatment approach is being considered. Please note that the above opinion was a consensus of the conference attendees and is intended only to assist in quality care of the discussed patient.  The responsibility for follow up on the input given during the conference, along with any final decisions regarding plan of care, is that of the patient and the patient's provider. Accordingly, appointments have only been recommended based on this information and have NOT been scheduled unless otherwise noted.      TO THE PATIENT:  This summary is a brief record of major aspects of your cancer treatment. You may choose to share a copy with any of your doctors or nurses. However, this is not a detailed or comprehensive  record of your care.

## 2025-04-15 ENCOUNTER — CONSULT (OUTPATIENT)
Dept: CARDIAC SURGERY | Facility: CLINIC | Age: 70
End: 2025-04-15
Attending: INTERNAL MEDICINE
Payer: MEDICARE

## 2025-04-15 VITALS
TEMPERATURE: 98 F | WEIGHT: 139.99 LBS | RESPIRATION RATE: 15 BRPM | SYSTOLIC BLOOD PRESSURE: 160 MMHG | OXYGEN SATURATION: 98 % | DIASTOLIC BLOOD PRESSURE: 79 MMHG | HEIGHT: 63 IN | HEART RATE: 57 BPM | BODY MASS INDEX: 24.8 KG/M2

## 2025-04-15 DIAGNOSIS — C34.91 NSCLC OF RIGHT LUNG (HCC): ICD-10-CM

## 2025-04-15 DIAGNOSIS — C34.11 PRIMARY ADENOCARCINOMA OF UPPER LOBE OF RIGHT LUNG (HCC): Primary | ICD-10-CM

## 2025-04-15 PROCEDURE — 99205 OFFICE O/P NEW HI 60 MIN: CPT | Performed by: THORACIC SURGERY (CARDIOTHORACIC VASCULAR SURGERY)

## 2025-04-15 RX ORDER — ACETAMINOPHEN 325 MG/1
975 TABLET ORAL ONCE
Status: CANCELLED | OUTPATIENT
Start: 2025-04-15 | End: 2025-04-15

## 2025-04-15 RX ORDER — CEFAZOLIN SODIUM 2 G/50ML
2000 SOLUTION INTRAVENOUS ONCE
Status: CANCELLED | OUTPATIENT
Start: 2025-04-15 | End: 2025-04-15

## 2025-04-15 RX ORDER — GABAPENTIN 300 MG/1
300 CAPSULE ORAL ONCE
Status: CANCELLED | OUTPATIENT
Start: 2025-04-15 | End: 2025-04-15

## 2025-04-15 RX ORDER — HEPARIN SODIUM 5000 [USP'U]/ML
5000 INJECTION, SOLUTION INTRAVENOUS; SUBCUTANEOUS
Status: CANCELLED | OUTPATIENT
Start: 2025-04-16 | End: 2025-04-17

## 2025-04-15 NOTE — PROGRESS NOTES
Name: Ashley Phillips      : 1955      MRN: [id number]  Encounter Provider: Myles Dominique MD  Encounter Date: 4/15/2025   Encounter department: [facility] [place]  :  Assessment & Plan  NSCLC of right lung (HCC)    Orders:    Ambulatory Referral to Thoracic Surgery    Case request operating room: THORACOSCOPY VIDEO ASSISTED SURGERY (VATS), LOBECTOMY RIGHT UPPER LUNG WITH MEDIASTINAL LYMPH NODE DISSECTION, BRONCHOSCOPY FLEXIBLE; Standing    Type and screen; Future    Basic metabolic panel; Future    CBC and Platelet; Future    EKG 12 lead; Future    Primary adenocarcinoma of upper lobe of right lung (HCC)  Ms. Phillips has a clinical stage I adenocarcinoma of the right upper lobe.  She has adequate pulmonary function and a good performance status to tolerate lobectomy.  I discussed the risks and benefits associated with bronchoscopy, right thoracoscopic upper lobectomy and mediastinal lymph node dissection.  She signed her consent in the office today and will work with our  to find a mutual time.           Thoracic History     Problem   Primary Adenocarcinoma of Upper Lobe of Right Lung (Hcc)        History of Present Illness   HPI  Ashley Phillips is a 70 y.o. female was referred by Dr. Burton for consultation regarding a lung mass.  She was recently admitted to the hospital with acute cholecystitis and a CT scan had an incidental finding of a right upper lobe spiculated lung mass measuring approximately 3 cm.  The CT scan was performed on 2025 and was personally reviewed.  I did not see any concerning mediastinal or hilar lymphadenopathy on it.  She then underwent a PET CT scan on 2025 and this is also personally reviewed.  The spiculated 3 cm right upper lobe lung mass had an SUV of 3.1.  There was no hypermetabolic mediastinal or hilar lymphadenopathy.  Her spleen was mildly enlarged and slightly hypermetabolic in keeping with her  "autoimmune thrombocytopenia.  There was no sign of distant metastatic disease.  She underwent navigational bronchoscopy on April 8, 2025 and biopsy of the mass demonstrated pulmonary adenocarcinoma.  The level 7 lymph node was biopsied and was negative for malignancy.    Ms. Phillips appears asymptomatic with respect to this lung mass.  Her weight has been stable.  She denies new headaches or blurry vision, new bone or joint pains, or new numbness or weakness in extremity.  She denies chronic cough, purulent sputum production, or hemoptysis except after her biopsy.  She is very active and is able to care for her grandchildren for 10 hours a day.  She denies shortness of breath and is mainly limited by right hip discomfort as she is awaiting a hip replacement.  Her platelet count typically hovers around 90-1 05 and she denies easy bruising or bleeding.  She recently underwent complete pulmonary function testing and her FEV1 was 2.25 L or 114% of predicted and her uncorrected DLCO was 89% of predicted.    Review of Systems   Constitutional:  Negative for activity change, appetite change, chills, fever and unexpected weight change.   HENT:  Negative for voice change.    Respiratory:  Negative for cough, shortness of breath and wheezing.    Cardiovascular:  Negative for chest pain, palpitations and leg swelling.   Gastrointestinal:  Negative for abdominal pain, constipation, diarrhea, nausea and vomiting.   Musculoskeletal:  Negative for arthralgias and myalgias.   Skin:  Negative for rash.   Neurological:  Negative for dizziness, seizures, weakness, numbness and headaches.   Hematological:  Negative for adenopathy. Does not bruise/bleed easily.   Psychiatric/Behavioral:  Negative for confusion.            Objective   /79 (BP Location: Left arm, Patient Position: Sitting, Cuff Size: Standard)   Pulse 57   Temp 98 °F (36.7 °C) (Temporal)   Resp 15   Ht 5' 3\" (1.6 m)   Wt 63.5 kg (139 lb 15.9 oz)   SpO2 98%   " BMI 24.80 kg/m²     Pain Screening:  Pain Score: 0-No pain  ECOG ECOG Performance Status: 0 - Fully active, able to carry on all pre-disease performance without restriction  Physical Exam  Constitutional:       Appearance: She is well-developed.   HENT:      Head: Normocephalic and atraumatic.      Mouth/Throat:      Pharynx: No oropharyngeal exudate.   Eyes:      General: No scleral icterus.     Conjunctiva/sclera: Conjunctivae normal.      Pupils: Pupils are equal, round, and reactive to light.   Neck:      Thyroid: No thyromegaly.      Trachea: No tracheal deviation.   Cardiovascular:      Rate and Rhythm: Normal rate and regular rhythm.      Heart sounds: Normal heart sounds.   Pulmonary:      Effort: Pulmonary effort is normal. No respiratory distress.      Breath sounds: Normal breath sounds. No wheezing.   Abdominal:      General: There is no distension.      Palpations: Abdomen is soft.      Tenderness: There is no abdominal tenderness.   Musculoskeletal:         General: Normal range of motion.      Cervical back: Normal range of motion and neck supple.   Lymphadenopathy:      Cervical: No cervical adenopathy.   Skin:     General: Skin is warm and dry.   Neurological:      Mental Status: She is alert and oriented to person, place, and time.   Psychiatric:         Behavior: Behavior normal.         Thought Content: Thought content normal.         Judgment: Judgment normal.         Labs:     Radiology Results Review : I have reviewed images/report studies in PACS as described above (in the HPI).  Pathology: I have reviewed pathology reports described above.

## 2025-04-15 NOTE — ASSESSMENT & PLAN NOTE
Ms. Phillips has a clinical stage I adenocarcinoma of the right upper lobe.  She has adequate pulmonary function and a good performance status to tolerate lobectomy.  I discussed the risks and benefits associated with bronchoscopy, right thoracoscopic upper lobectomy and mediastinal lymph node dissection.  She signed her consent in the office today and will work with our  to find a mutual time.

## 2025-04-15 NOTE — H&P (VIEW-ONLY)
Name: Ashley Phillips      : 1955      MRN: [id number]  Encounter Provider: Myles Dominique MD  Encounter Date: 4/15/2025   Encounter department: [facility] [place]  :  Assessment & Plan  NSCLC of right lung (HCC)    Orders:    Ambulatory Referral to Thoracic Surgery    Case request operating room: THORACOSCOPY VIDEO ASSISTED SURGERY (VATS), LOBECTOMY RIGHT UPPER LUNG WITH MEDIASTINAL LYMPH NODE DISSECTION, BRONCHOSCOPY FLEXIBLE; Standing    Type and screen; Future    Basic metabolic panel; Future    CBC and Platelet; Future    EKG 12 lead; Future    Primary adenocarcinoma of upper lobe of right lung (HCC)  Ms. Phillips has a clinical stage I adenocarcinoma of the right upper lobe.  She has adequate pulmonary function and a good performance status to tolerate lobectomy.  I discussed the risks and benefits associated with bronchoscopy, right thoracoscopic upper lobectomy and mediastinal lymph node dissection.  She signed her consent in the office today and will work with our  to find a mutual time.           Thoracic History     Problem   Primary Adenocarcinoma of Upper Lobe of Right Lung (Hcc)        History of Present Illness   HPI  Ashley Phillips is a 70 y.o. female was referred by Dr. Burton for consultation regarding a lung mass.  She was recently admitted to the hospital with acute cholecystitis and a CT scan had an incidental finding of a right upper lobe spiculated lung mass measuring approximately 3 cm.  The CT scan was performed on 2025 and was personally reviewed.  I did not see any concerning mediastinal or hilar lymphadenopathy on it.  She then underwent a PET CT scan on 2025 and this is also personally reviewed.  The spiculated 3 cm right upper lobe lung mass had an SUV of 3.1.  There was no hypermetabolic mediastinal or hilar lymphadenopathy.  Her spleen was mildly enlarged and slightly hypermetabolic in keeping with her  "autoimmune thrombocytopenia.  There was no sign of distant metastatic disease.  She underwent navigational bronchoscopy on April 8, 2025 and biopsy of the mass demonstrated pulmonary adenocarcinoma.  The level 7 lymph node was biopsied and was negative for malignancy.    Ms. Phillips appears asymptomatic with respect to this lung mass.  Her weight has been stable.  She denies new headaches or blurry vision, new bone or joint pains, or new numbness or weakness in extremity.  She denies chronic cough, purulent sputum production, or hemoptysis except after her biopsy.  She is very active and is able to care for her grandchildren for 10 hours a day.  She denies shortness of breath and is mainly limited by right hip discomfort as she is awaiting a hip replacement.  Her platelet count typically hovers around 90-1 05 and she denies easy bruising or bleeding.  She recently underwent complete pulmonary function testing and her FEV1 was 2.25 L or 114% of predicted and her uncorrected DLCO was 89% of predicted.    Review of Systems   Constitutional:  Negative for activity change, appetite change, chills, fever and unexpected weight change.   HENT:  Negative for voice change.    Respiratory:  Negative for cough, shortness of breath and wheezing.    Cardiovascular:  Negative for chest pain, palpitations and leg swelling.   Gastrointestinal:  Negative for abdominal pain, constipation, diarrhea, nausea and vomiting.   Musculoskeletal:  Negative for arthralgias and myalgias.   Skin:  Negative for rash.   Neurological:  Negative for dizziness, seizures, weakness, numbness and headaches.   Hematological:  Negative for adenopathy. Does not bruise/bleed easily.   Psychiatric/Behavioral:  Negative for confusion.            Objective   /79 (BP Location: Left arm, Patient Position: Sitting, Cuff Size: Standard)   Pulse 57   Temp 98 °F (36.7 °C) (Temporal)   Resp 15   Ht 5' 3\" (1.6 m)   Wt 63.5 kg (139 lb 15.9 oz)   SpO2 98%   " BMI 24.80 kg/m²     Pain Screening:  Pain Score: 0-No pain  ECOG ECOG Performance Status: 0 - Fully active, able to carry on all pre-disease performance without restriction  Physical Exam  Constitutional:       Appearance: She is well-developed.   HENT:      Head: Normocephalic and atraumatic.      Mouth/Throat:      Pharynx: No oropharyngeal exudate.   Eyes:      General: No scleral icterus.     Conjunctiva/sclera: Conjunctivae normal.      Pupils: Pupils are equal, round, and reactive to light.   Neck:      Thyroid: No thyromegaly.      Trachea: No tracheal deviation.   Cardiovascular:      Rate and Rhythm: Normal rate and regular rhythm.      Heart sounds: Normal heart sounds.   Pulmonary:      Effort: Pulmonary effort is normal. No respiratory distress.      Breath sounds: Normal breath sounds. No wheezing.   Abdominal:      General: There is no distension.      Palpations: Abdomen is soft.      Tenderness: There is no abdominal tenderness.   Musculoskeletal:         General: Normal range of motion.      Cervical back: Normal range of motion and neck supple.   Lymphadenopathy:      Cervical: No cervical adenopathy.   Skin:     General: Skin is warm and dry.   Neurological:      Mental Status: She is alert and oriented to person, place, and time.   Psychiatric:         Behavior: Behavior normal.         Thought Content: Thought content normal.         Judgment: Judgment normal.         Labs:     Radiology Results Review : I have reviewed images/report studies in PACS as described above (in the HPI).  Pathology: I have reviewed pathology reports described above.

## 2025-04-16 ENCOUNTER — OFFICE VISIT (OUTPATIENT)
Dept: LAB | Facility: HOSPITAL | Age: 70
End: 2025-04-16
Payer: MEDICARE

## 2025-04-16 ENCOUNTER — LAB REQUISITION (OUTPATIENT)
Dept: LAB | Facility: HOSPITAL | Age: 70
End: 2025-04-16
Payer: MEDICARE

## 2025-04-16 ENCOUNTER — APPOINTMENT (OUTPATIENT)
Dept: LAB | Facility: HOSPITAL | Age: 70
End: 2025-04-16
Payer: MEDICARE

## 2025-04-16 DIAGNOSIS — Z01.818 OTHER SPECIFIED PRE-OPERATIVE EXAMINATION: ICD-10-CM

## 2025-04-16 DIAGNOSIS — Z01.818 ENCOUNTER FOR OTHER PREPROCEDURAL EXAMINATION: ICD-10-CM

## 2025-04-16 DIAGNOSIS — C34.91 NSCLC OF RIGHT LUNG (HCC): ICD-10-CM

## 2025-04-16 LAB
ANION GAP SERPL CALCULATED.3IONS-SCNC: 11 MMOL/L (ref 4–13)
BUN SERPL-MCNC: 15 MG/DL (ref 5–25)
CALCIUM SERPL-MCNC: 9.3 MG/DL (ref 8.4–10.2)
CHLORIDE SERPL-SCNC: 105 MMOL/L (ref 96–108)
CO2 SERPL-SCNC: 25 MMOL/L (ref 21–32)
CREAT SERPL-MCNC: 0.85 MG/DL (ref 0.6–1.3)
ERYTHROCYTE [DISTWIDTH] IN BLOOD BY AUTOMATED COUNT: 13.5 % (ref 11.6–15.1)
GFR SERPL CREATININE-BSD FRML MDRD: 69 ML/MIN/1.73SQ M
GLUCOSE SERPL-MCNC: 160 MG/DL (ref 65–140)
HCT VFR BLD AUTO: 36.4 % (ref 34.8–46.1)
HGB BLD-MCNC: 11.8 G/DL (ref 11.5–15.4)
MCH RBC QN AUTO: 29.9 PG (ref 26.8–34.3)
MCHC RBC AUTO-ENTMCNC: 32.4 G/DL (ref 31.4–37.4)
MCV RBC AUTO: 92 FL (ref 82–98)
PLATELET # BLD AUTO: 95 THOUSANDS/UL (ref 149–390)
PMV BLD AUTO: 10.2 FL (ref 8.9–12.7)
POTASSIUM SERPL-SCNC: 3.9 MMOL/L (ref 3.5–5.3)
RBC # BLD AUTO: 3.95 MILLION/UL (ref 3.81–5.12)
SODIUM SERPL-SCNC: 141 MMOL/L (ref 135–147)
WBC # BLD AUTO: 4.95 THOUSAND/UL (ref 4.31–10.16)

## 2025-04-16 PROCEDURE — 86850 RBC ANTIBODY SCREEN: CPT | Performed by: THORACIC SURGERY (CARDIOTHORACIC VASCULAR SURGERY)

## 2025-04-16 PROCEDURE — 36415 COLL VENOUS BLD VENIPUNCTURE: CPT

## 2025-04-16 PROCEDURE — 86901 BLOOD TYPING SEROLOGIC RH(D): CPT | Performed by: THORACIC SURGERY (CARDIOTHORACIC VASCULAR SURGERY)

## 2025-04-16 PROCEDURE — 85027 COMPLETE CBC AUTOMATED: CPT

## 2025-04-16 PROCEDURE — 80048 BASIC METABOLIC PNL TOTAL CA: CPT

## 2025-04-16 PROCEDURE — 86900 BLOOD TYPING SEROLOGIC ABO: CPT | Performed by: THORACIC SURGERY (CARDIOTHORACIC VASCULAR SURGERY)

## 2025-04-16 PROCEDURE — 93005 ELECTROCARDIOGRAM TRACING: CPT

## 2025-04-16 PROCEDURE — 86870 RBC ANTIBODY IDENTIFICATION: CPT | Performed by: THORACIC SURGERY (CARDIOTHORACIC VASCULAR SURGERY)

## 2025-04-17 ENCOUNTER — TELEPHONE (OUTPATIENT)
Dept: CARDIAC SURGERY | Facility: CLINIC | Age: 70
End: 2025-04-17

## 2025-04-17 ENCOUNTER — TELEPHONE (OUTPATIENT)
Dept: OTHER | Facility: HOSPITAL | Age: 70
End: 2025-04-17

## 2025-04-17 ENCOUNTER — TELEPHONE (OUTPATIENT)
Age: 70
End: 2025-04-17

## 2025-04-17 LAB
ABO GROUP BLD: NORMAL
BLD GP AB SCN SERPL QL: POSITIVE
BLOOD GROUP ANTIBODIES SERPL: NORMAL
RH BLD: POSITIVE
SPECIMEN EXPIRATION DATE: NORMAL

## 2025-04-17 NOTE — TELEPHONE ENCOUNTER
CRN called patient to discuss potential clinical Trial. CRN was apologetic for confusion and any stress caused. Patient was appreciative and receptive to discussing trial with CRN further. After discussion patient states she would prefer to pursue SOC Surgery as discussed with Dr Dominique at time of visit. CRN thanked patient for her time, and let patient know she would inform Dr Dominique know of her decision. Patient appreciative of call and verbalized understanding.

## 2025-04-17 NOTE — TELEPHONE ENCOUNTER
Patient called to advise PCP that on Thursday 4/24 she is having surgery to remove a lobe of her lung.

## 2025-04-17 NOTE — TELEPHONE ENCOUNTER
Telephone call to patient. As case was discussed with Dr Dominique and patient was found to be potentially eligible for [facility] Lung 12 Study and Dr. Newberryinf agreeable to CRN outreach. Voicemail left with patient and CRN contact information provided for response.

## 2025-04-17 NOTE — TELEPHONE ENCOUNTER
Patient called and I spoke with her. She called with concern of a VM she received about a clinical trial, when she is scheduled for surgery next week with Dr. Dominique. She states she knew nothing about a clinical trial and it makes her sick. She seemed upset as she shared this information. She would like to know why she got this message and questioned if there is something not being shared with her. I provided support and advised that I'm not personally aware of this, but that it sounds she may be eligible for a clinical trial. She voiced understanding throughout our call and knows I will reach out to our team to further advise. She would like a call back to further discuss.

## 2025-04-18 LAB
ATRIAL RATE: 52 BPM
P AXIS: 21 DEGREES
PR INTERVAL: 144 MS
QRS AXIS: 7 DEGREES
QRSD INTERVAL: 84 MS
QT INTERVAL: 462 MS
QTC INTERVAL: 430 MS
T WAVE AXIS: 41 DEGREES
VENTRICULAR RATE: 52 BPM

## 2025-04-18 PROCEDURE — 93010 ELECTROCARDIOGRAM REPORT: CPT | Performed by: INTERNAL MEDICINE

## 2025-04-22 ENCOUNTER — PATIENT OUTREACH (OUTPATIENT)
Dept: HEMATOLOGY ONCOLOGY | Facility: CLINIC | Age: 70
End: 2025-04-22

## 2025-04-22 ENCOUNTER — APPOINTMENT (OUTPATIENT)
Dept: LAB | Facility: HOSPITAL | Age: 70
End: 2025-04-22
Payer: MEDICARE

## 2025-04-22 ENCOUNTER — ANESTHESIA EVENT (OUTPATIENT)
Dept: PERIOP | Facility: HOSPITAL | Age: 70
DRG: 164 | End: 2025-04-22
Payer: MEDICARE

## 2025-04-22 ENCOUNTER — LAB REQUISITION (OUTPATIENT)
Dept: LAB | Facility: HOSPITAL | Age: 70
DRG: 164 | End: 2025-04-22
Payer: MEDICARE

## 2025-04-22 DIAGNOSIS — Z01.818 ENCOUNTER FOR OTHER PREPROCEDURAL EXAMINATION: ICD-10-CM

## 2025-04-22 DIAGNOSIS — Z01.818 PRE-OP TESTING: Primary | ICD-10-CM

## 2025-04-22 LAB
ABO GROUP BLD: NORMAL
BLD GP AB SCN SERPL QL: POSITIVE
CARIS ALK: NORMAL
CARIS GENOMIC LOH - EXOME: NORMAL
CARIS HER2/NEU: NEGATIVE
CARIS HLA-A: NORMAL
CARIS HLA-B: NORMAL
CARIS HLA-C: NORMAL
CARIS MSI - EXOME: NORMAL
CARIS PD-L1 (22C3): POSITIVE
CARIS PD-L1 (SP263): NEGATIVE
CARIS PD-L1 FDA (28-8): NEGATIVE
CARIS PD-L1 FDA(SP142): NEGATIVE
CARIS TMB - EXOME: NORMAL
RH BLD: POSITIVE
SPECIMEN EXPIRATION DATE: NORMAL

## 2025-04-22 PROCEDURE — 86850 RBC ANTIBODY SCREEN: CPT | Performed by: THORACIC SURGERY (CARDIOTHORACIC VASCULAR SURGERY)

## 2025-04-22 PROCEDURE — 86901 BLOOD TYPING SEROLOGIC RH(D): CPT | Performed by: THORACIC SURGERY (CARDIOTHORACIC VASCULAR SURGERY)

## 2025-04-22 PROCEDURE — 86900 BLOOD TYPING SEROLOGIC ABO: CPT | Performed by: THORACIC SURGERY (CARDIOTHORACIC VASCULAR SURGERY)

## 2025-04-22 PROCEDURE — 36415 COLL VENOUS BLD VENIPUNCTURE: CPT

## 2025-04-22 NOTE — PRE-PROCEDURE INSTRUCTIONS
Pre-Surgery Instructions:   Medication Instructions    allopurinol (ZYLOPRIM) 100 mg tablet Take day of surgery.    amLODIPine (NORVASC) 2.5 mg tablet Take day of surgery.    atenolol (TENORMIN) 50 mg tablet Take day of surgery.    Biotin 1000 MCG CHEW Stop taking 7 days prior to surgery.    Cholecalciferol (Vitamin D3) 50 MCG (2000 UT) TABS Stop taking 7 days prior to surgery.    cyanocobalamin (VITAMIN B-12) 1000 MCG tablet Stop taking 7 days prior to surgery.    levothyroxine 100 mcg tablet Take day of surgery.    valsartan (DIOVAN) 160 mg tablet Hold day of surgery.   See Geriatric Assessment below...  Cognitive Assessment:   CAM:   TUG <15 sec:  Falls (last 6 months): 0  Hand  score:  -Attempt 1:  -Attempt 2:  -Attempt 3:  Endy Total Score: 22  PHQ- 9 Depression Scale:0  Nutrition Assessment Score:14  METS: 9.25  Incentive Spirometry Level:   Health goals:  -What are your overall health goals? (quit smoking, wt. loss, rest, decrease stress)    - to get better     -What brings you strength? (family, friends, Jainism, health)    - Family, Grand kids     -What activities are important to you? (exercise, reading, travel, work)    - staying busy     Medication instructions for day of surgery reviewed. Please take all instructed medications with only a sip of water.       You will receive a call one business day prior to surgery with an arrival time and hospital directions. If your surgery is scheduled on a Monday, the hospital will be calling you on the Friday prior to your surgery. If you have not heard from anyone by 8pm, please call the hospital supervisor through the hospital  at [phone]. ([place] [phone] or [place] [phone]).    Do not eat or drink anything after midnight the night before your surgery, including candy, mints, lifesavers, or chewing gum. Do not drink alcohol 24hrs before your surgery. Try not to smoke at least 24hrs before your surgery.       Follow the pre surgery  showering instructions as listed in the “My Surgical Experience Booklet” or otherwise provided by your surgeon's office. Do not use a blade to shave the surgical area 1 week before surgery. It is okay to use a clean electric clippers up to 24 hours before surgery. Do not apply any lotions, creams, including makeup, cologne, deodorant, or perfumes after showering on the day of your surgery. Do not use dry shampoo, hair spray, hair gel, or any type of hair products.     No contact lenses, eye make-up, or artificial eyelashes. Remove nail polish, including gel polish, and any artificial, gel, or acrylic nails if possible. Remove all jewelry including rings and body piercing jewelry.     Wear causal clothing that is easy to take on and off. Consider your type of surgery.    Keep any valuables, jewelry, piercings at home. Please bring any specially ordered equipment (sling, braces) if indicated.    Arrange for a responsible person to drive you to and from the hospital on the day of your surgery. Please confirm the visitor policy for the day of your procedure when you receive your phone call with an arrival time.     Call the surgeon's office with any new illnesses, exposures, or additional questions prior to surgery.    Please reference your “My Surgical Experience Booklet” for additional information to prepare for your upcoming surgery.

## 2025-04-22 NOTE — PROGRESS NOTES
I reached out and spoke with Ashley, now that consults have been completed with the oncology teams. I introduced myself and explained my role as their Patient Navigator. I reviewed for any barriers to care and offered referrals to supportive services as needed. I reviewed and updated the members assigned to the care team in [facility]. She knows the members of the care team as well as how and when to contact them with any needs.     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 She actually graduated from [employer] school of nursing. We talked about all the changes over the years. We talked about all the phone calls that come through to new patients, and how all the roles work now. She was very appreciative of my call today. I told her I will reach out after her surgery to check back in on her, she had my direct number if she need anything.    Distress Thermometer completed at this time. Patient scored 2/10. Based on responses to DT, no indication for referral to SW needed at this time. .     She is currently able to drive and denies any transportation needs.      She denies any uncontrolled symptoms. Discussed role of Palliative Care in symptom and side effect management. Declined referral at this time.    She states that she is eating and drinking as per usual with no unintentional weight loss.   Completed MST and patient Does not meet criteria for referral to Oncology Dietician Services    Patient does not smoke.     She states she is well supported by family and friends.  Community support groups discussed including the Cancer Support Community of the [facility]. Patient declined information at this time.     She feels she has adequate insurance coverage and denies any financial concerns at this time.     She verbalizes managing the schedules well.   Future Appointments   Date Time Provider Department Center   5/2/2025  9:40 AM DO EPR Connor  [facility]   5/6/2025 10:40 AM Myles Larson  MD [facility]   12/29/2025  3:45 PM Sulaiman Hernandez, DO JT  [facility]        Based on individual needs I will follow up in about 5 weeks. I have provided my direct contact information and welcome them to contact me if needs as discussed above change. She was appreciative for the call.

## 2025-04-23 NOTE — ANESTHESIA PREPROCEDURE EVALUATION
Procedure:  THORACOSCOPY VIDEO ASSISTED SURGERY (VATS) RIGHT UPPER LOBE PULMONARY LOBECTOMY, MEDIASTINAL LYMPH NODE DISSECTION (Right: Chest)  RIGHT UPPER LOBE PULMONARY LOBECTOMY WITH MEDIASTINAL LYMPH NODE DISSECTION, THORACOSCOPIC (Right: Chest)  BRONCHOSCOPY FLEXIBLE ([place])    Relevant Problems   ANESTHESIA (within normal limits)   (-) History of anesthesia complications      CARDIO   (+) Essential hypertension      ENDO   (+) Hypothyroidism      GI/HEPATIC (within normal limits)      /RENAL (within normal limits)      HEMATOLOGY   (+) Autoimmune thrombocytopenia (HCC)   (+) Thrombocytopenia (HCC)      MUSCULOSKELETAL   (+) Chronic right-sided low back pain with right-sided sciatica   (+) Primary osteoarthritis of right hip      NEURO/PSYCH   (+) Chronic right-sided low back pain with right-sided sciatica      PULMONARY (within normal limits)      Oncology   (+) History of thyroid cancer   (+) Primary adenocarcinoma of upper lobe of right lung (HCC)      Other   (+) Splenomegaly        Physical Exam    Airway    Mallampati score: II  TM Distance: >3 FB  Neck ROM: full     Dental   Comment: Most lower teeth are missing. upper dentures    Cardiovascular  Rhythm: regular, Rate: normal, Cardiovascular exam normal    Pulmonary  Pulmonary exam normal Breath sounds clear to auscultation    Other Findings  post-pubertal.      Anesthesia Plan  ASA Score- 3     Anesthesia Type- general with ASA Monitors.         Additional Monitors:     Airway Plan: ETT.           Plan Factors-Exercise tolerance (METS): >4 METS.    Chart reviewed. EKG reviewed.  Existing labs reviewed. Patient summary reviewed.    Patient is not a current smoker.  Patient did not smoke on day of surgery.            Induction- intravenous.    Postoperative Plan- . Planned trial extubation    Perioperative Resuscitation Plan - Level 1 - Full Code.       Informed Consent- Anesthetic plan and risks discussed with patient and spouse.  I personally  reviewed this patient with the CRNA. Discussed and agreed on the Anesthesia Plan with the CRNA..      NPO Status:  No vitals data found for the desired time range.      NPO verified.  Patient completed pre-surgical drink at approximately 8:30am today, 4/24/25.  Patient took PO atenolol this morning.    Plan:  GETA, NORMA    Risks and benefits of general anesthesia were discussed with the patient.  Discussed risks of anesthesia including, but not limited to, the risk of dental injury, n/v, sore throat, corneal abrasions, and arrhythmias.  All questions were answered.  Anesthesia consent was obtained from the patient.

## 2025-04-24 ENCOUNTER — APPOINTMENT (INPATIENT)
Dept: RADIOLOGY | Facility: HOSPITAL | Age: 70
DRG: 164 | End: 2025-04-24
Payer: MEDICARE

## 2025-04-24 ENCOUNTER — HOSPITAL ENCOUNTER (INPATIENT)
Facility: HOSPITAL | Age: 70
LOS: 2 days | Discharge: HOME | DRG: 164 | End: 2025-04-26
Attending: THORACIC SURGERY (CARDIOTHORACIC VASCULAR SURGERY) | Admitting: THORACIC SURGERY (CARDIOTHORACIC VASCULAR SURGERY)
Payer: MEDICARE

## 2025-04-24 ENCOUNTER — ANESTHESIA (OUTPATIENT)
Dept: PERIOP | Facility: HOSPITAL | Age: 70
DRG: 164 | End: 2025-04-24
Payer: MEDICARE

## 2025-04-24 DIAGNOSIS — C34.91 NSCLC OF RIGHT LUNG (HCC): ICD-10-CM

## 2025-04-24 DIAGNOSIS — Z01.818 PRE-OP TESTING: Primary | ICD-10-CM

## 2025-04-24 LAB
ANION GAP SERPL CALCULATED.3IONS-SCNC: 6 MMOL/L (ref 4–13)
BUN SERPL-MCNC: 10 MG/DL (ref 5–25)
CALCIUM SERPL-MCNC: 8.7 MG/DL (ref 8.4–10.2)
CHLORIDE SERPL-SCNC: 104 MMOL/L (ref 96–108)
CO2 SERPL-SCNC: 27 MMOL/L (ref 21–32)
CREAT SERPL-MCNC: 0.84 MG/DL (ref 0.6–1.3)
ERYTHROCYTE [DISTWIDTH] IN BLOOD BY AUTOMATED COUNT: 13.5 % (ref 11.6–15.1)
GFR SERPL CREATININE-BSD FRML MDRD: 70 ML/MIN/1.73SQ M
GLUCOSE SERPL-MCNC: 120 MG/DL (ref 65–140)
HCT VFR BLD AUTO: 33.4 % (ref 34.8–46.1)
HGB BLD-MCNC: 10.9 G/DL (ref 11.5–15.4)
MAGNESIUM SERPL-MCNC: 1.7 MG/DL (ref 1.9–2.7)
MCH RBC QN AUTO: 30 PG (ref 26.8–34.3)
MCHC RBC AUTO-ENTMCNC: 32.6 G/DL (ref 31.4–37.4)
MCV RBC AUTO: 92 FL (ref 82–98)
POTASSIUM SERPL-SCNC: 4.3 MMOL/L (ref 3.5–5.3)
RBC # BLD AUTO: 3.63 MILLION/UL (ref 3.81–5.12)
SODIUM SERPL-SCNC: 137 MMOL/L (ref 135–147)
WBC # BLD AUTO: 6 THOUSAND/UL (ref 4.31–10.16)

## 2025-04-24 PROCEDURE — 88309 TISSUE EXAM BY PATHOLOGIST: CPT | Performed by: PATHOLOGY

## 2025-04-24 PROCEDURE — 94664 DEMO&/EVAL PT USE INHALER: CPT

## 2025-04-24 PROCEDURE — 32663 THORACOSCOPY W/LOBECTOMY: CPT | Performed by: THORACIC SURGERY (CARDIOTHORACIC VASCULAR SURGERY)

## 2025-04-24 PROCEDURE — 80048 BASIC METABOLIC PNL TOTAL CA: CPT

## 2025-04-24 PROCEDURE — 32674 THORACOSCOPY LYMPH NODE EXC: CPT | Performed by: THORACIC SURGERY (CARDIOTHORACIC VASCULAR SURGERY)

## 2025-04-24 PROCEDURE — 85027 COMPLETE CBC AUTOMATED: CPT

## 2025-04-24 PROCEDURE — 0BTC4ZZ RESECTION OF RIGHT UPPER LUNG LOBE, PERCUTANEOUS ENDOSCOPIC APPROACH: ICD-10-PCS | Performed by: THORACIC SURGERY (CARDIOTHORACIC VASCULAR SURGERY)

## 2025-04-24 PROCEDURE — 31622 DX BRONCHOSCOPE/WASH: CPT | Performed by: THORACIC SURGERY (CARDIOTHORACIC VASCULAR SURGERY)

## 2025-04-24 PROCEDURE — 94760 N-INVAS EAR/PLS OXIMETRY 1: CPT

## 2025-04-24 PROCEDURE — 71045 X-RAY EXAM CHEST 1 VIEW: CPT

## 2025-04-24 PROCEDURE — 07B74ZX EXCISION OF THORAX LYMPHATIC, PERCUTANEOUS ENDOSCOPIC APPROACH, DIAGNOSTIC: ICD-10-PCS | Performed by: THORACIC SURGERY (CARDIOTHORACIC VASCULAR SURGERY)

## 2025-04-24 PROCEDURE — 0BJ08ZZ INSPECTION OF TRACHEOBRONCHIAL TREE, VIA NATURAL OR ARTIFICIAL OPENING ENDOSCOPIC: ICD-10-PCS | Performed by: THORACIC SURGERY (CARDIOTHORACIC VASCULAR SURGERY)

## 2025-04-24 PROCEDURE — 88313 SPECIAL STAINS GROUP 2: CPT | Performed by: PATHOLOGY

## 2025-04-24 PROCEDURE — 88305 TISSUE EXAM BY PATHOLOGIST: CPT | Performed by: PATHOLOGY

## 2025-04-24 PROCEDURE — 83735 ASSAY OF MAGNESIUM: CPT

## 2025-04-24 RX ORDER — ACETAMINOPHEN 325 MG/1
975 TABLET ORAL ONCE
Status: COMPLETED | OUTPATIENT
Start: 2025-04-24 | End: 2025-04-24

## 2025-04-24 RX ORDER — GABAPENTIN 100 MG/1
100 CAPSULE ORAL 3 TIMES DAILY
Status: DISCONTINUED | OUTPATIENT
Start: 2025-04-24 | End: 2025-04-26 | Stop reason: HOSPADM

## 2025-04-24 RX ORDER — LIDOCAINE HYDROCHLORIDE 10 MG/ML
0.5 INJECTION, SOLUTION EPIDURAL; INFILTRATION; INTRACAUDAL; PERINEURAL ONCE AS NEEDED
Status: DISCONTINUED | OUTPATIENT
Start: 2025-04-24 | End: 2025-04-24 | Stop reason: HOSPADM

## 2025-04-24 RX ORDER — HYDROMORPHONE HCL/PF 1 MG/ML
SYRINGE (ML) INJECTION AS NEEDED
Status: DISCONTINUED | OUTPATIENT
Start: 2025-04-24 | End: 2025-04-24

## 2025-04-24 RX ORDER — DOCUSATE SODIUM 100 MG/1
100 CAPSULE, LIQUID FILLED ORAL 2 TIMES DAILY
Status: DISCONTINUED | OUTPATIENT
Start: 2025-04-24 | End: 2025-04-26 | Stop reason: HOSPADM

## 2025-04-24 RX ORDER — ONDANSETRON 2 MG/ML
INJECTION INTRAMUSCULAR; INTRAVENOUS AS NEEDED
Status: DISCONTINUED | OUTPATIENT
Start: 2025-04-24 | End: 2025-04-24

## 2025-04-24 RX ORDER — SODIUM CHLORIDE, SODIUM LACTATE, POTASSIUM CHLORIDE, CALCIUM CHLORIDE 600; 310; 30; 20 MG/100ML; MG/100ML; MG/100ML; MG/100ML
60 INJECTION, SOLUTION INTRAVENOUS CONTINUOUS
Status: DISCONTINUED | OUTPATIENT
Start: 2025-04-24 | End: 2025-04-25

## 2025-04-24 RX ORDER — SODIUM CHLORIDE 9 MG/ML
INJECTION, SOLUTION INTRAVENOUS CONTINUOUS PRN
Status: DISCONTINUED | OUTPATIENT
Start: 2025-04-24 | End: 2025-04-24

## 2025-04-24 RX ORDER — ONDANSETRON 2 MG/ML
4 INJECTION INTRAMUSCULAR; INTRAVENOUS EVERY 6 HOURS PRN
Status: DISCONTINUED | OUTPATIENT
Start: 2025-04-24 | End: 2025-04-26 | Stop reason: HOSPADM

## 2025-04-24 RX ORDER — HEPARIN SODIUM 5000 [USP'U]/ML
5000 INJECTION, SOLUTION INTRAVENOUS; SUBCUTANEOUS
Status: COMPLETED | OUTPATIENT
Start: 2025-04-24 | End: 2025-04-24

## 2025-04-24 RX ORDER — ONDANSETRON 2 MG/ML
4 INJECTION INTRAMUSCULAR; INTRAVENOUS ONCE AS NEEDED
Status: DISCONTINUED | OUTPATIENT
Start: 2025-04-24 | End: 2025-04-24 | Stop reason: HOSPADM

## 2025-04-24 RX ORDER — ENOXAPARIN SODIUM 100 MG/ML
40 INJECTION SUBCUTANEOUS DAILY
Status: DISCONTINUED | OUTPATIENT
Start: 2025-04-25 | End: 2025-04-26 | Stop reason: HOSPADM

## 2025-04-24 RX ORDER — MAGNESIUM SULFATE HEPTAHYDRATE 40 MG/ML
4 INJECTION, SOLUTION INTRAVENOUS ONCE
Status: COMPLETED | OUTPATIENT
Start: 2025-04-24 | End: 2025-04-24

## 2025-04-24 RX ORDER — PROPOFOL 10 MG/ML
INJECTION, EMULSION INTRAVENOUS CONTINUOUS PRN
Status: DISCONTINUED | OUTPATIENT
Start: 2025-04-24 | End: 2025-04-24

## 2025-04-24 RX ORDER — SODIUM CHLORIDE, SODIUM LACTATE, POTASSIUM CHLORIDE, CALCIUM CHLORIDE 600; 310; 30; 20 MG/100ML; MG/100ML; MG/100ML; MG/100ML
100 INJECTION, SOLUTION INTRAVENOUS CONTINUOUS
Status: DISCONTINUED | OUTPATIENT
Start: 2025-04-24 | End: 2025-04-24

## 2025-04-24 RX ORDER — ATENOLOL 50 MG/1
50 TABLET ORAL EVERY 12 HOURS SCHEDULED
Status: DISCONTINUED | OUTPATIENT
Start: 2025-04-24 | End: 2025-04-26 | Stop reason: HOSPADM

## 2025-04-24 RX ORDER — GABAPENTIN 300 MG/1
300 CAPSULE ORAL ONCE
Status: COMPLETED | OUTPATIENT
Start: 2025-04-24 | End: 2025-04-24

## 2025-04-24 RX ORDER — FENTANYL CITRATE/PF 50 MCG/ML
25 SYRINGE (ML) INJECTION
Status: COMPLETED | OUTPATIENT
Start: 2025-04-24 | End: 2025-04-24

## 2025-04-24 RX ORDER — OXYCODONE HYDROCHLORIDE 5 MG/1
2.5 TABLET ORAL EVERY 4 HOURS PRN
Refills: 0 | Status: DISCONTINUED | OUTPATIENT
Start: 2025-04-24 | End: 2025-04-26 | Stop reason: HOSPADM

## 2025-04-24 RX ORDER — EPHEDRINE SULFATE 50 MG/ML
INJECTION INTRAVENOUS AS NEEDED
Status: DISCONTINUED | OUTPATIENT
Start: 2025-04-24 | End: 2025-04-24

## 2025-04-24 RX ORDER — FENTANYL CITRATE 50 UG/ML
INJECTION, SOLUTION INTRAMUSCULAR; INTRAVENOUS AS NEEDED
Status: DISCONTINUED | OUTPATIENT
Start: 2025-04-24 | End: 2025-04-24

## 2025-04-24 RX ORDER — CEFAZOLIN SODIUM 2 G/50ML
2000 SOLUTION INTRAVENOUS ONCE
Status: COMPLETED | OUTPATIENT
Start: 2025-04-24 | End: 2025-04-24

## 2025-04-24 RX ORDER — ROCURONIUM BROMIDE 10 MG/ML
INJECTION, SOLUTION INTRAVENOUS AS NEEDED
Status: DISCONTINUED | OUTPATIENT
Start: 2025-04-24 | End: 2025-04-24

## 2025-04-24 RX ORDER — LIDOCAINE HYDROCHLORIDE 10 MG/ML
INJECTION, SOLUTION EPIDURAL; INFILTRATION; INTRACAUDAL; PERINEURAL AS NEEDED
Status: DISCONTINUED | OUTPATIENT
Start: 2025-04-24 | End: 2025-04-24

## 2025-04-24 RX ORDER — PROPOFOL 10 MG/ML
INJECTION, EMULSION INTRAVENOUS AS NEEDED
Status: DISCONTINUED | OUTPATIENT
Start: 2025-04-24 | End: 2025-04-24

## 2025-04-24 RX ORDER — AMLODIPINE BESYLATE 2.5 MG/1
2.5 TABLET ORAL DAILY
Status: DISCONTINUED | OUTPATIENT
Start: 2025-04-25 | End: 2025-04-26 | Stop reason: HOSPADM

## 2025-04-24 RX ORDER — LEVOTHYROXINE SODIUM 100 UG/1
100 TABLET ORAL
Status: DISCONTINUED | OUTPATIENT
Start: 2025-04-25 | End: 2025-04-26 | Stop reason: HOSPADM

## 2025-04-24 RX ORDER — SENNOSIDES 8.6 MG
1 TABLET ORAL DAILY
Status: DISCONTINUED | OUTPATIENT
Start: 2025-04-25 | End: 2025-04-26 | Stop reason: HOSPADM

## 2025-04-24 RX ORDER — POLYETHYLENE GLYCOL 3350 17 G/17G
17 POWDER, FOR SOLUTION ORAL DAILY PRN
Status: DISCONTINUED | OUTPATIENT
Start: 2025-04-24 | End: 2025-04-26 | Stop reason: HOSPADM

## 2025-04-24 RX ORDER — OXYCODONE HYDROCHLORIDE 5 MG/1
5 TABLET ORAL EVERY 4 HOURS PRN
Refills: 0 | Status: DISCONTINUED | OUTPATIENT
Start: 2025-04-24 | End: 2025-04-26 | Stop reason: HOSPADM

## 2025-04-24 RX ORDER — ACETAMINOPHEN 325 MG/1
975 TABLET ORAL EVERY 6 HOURS
Status: DISCONTINUED | OUTPATIENT
Start: 2025-04-24 | End: 2025-04-26 | Stop reason: HOSPADM

## 2025-04-24 RX ORDER — ALLOPURINOL 100 MG/1
100 TABLET ORAL DAILY
Status: DISCONTINUED | OUTPATIENT
Start: 2025-04-25 | End: 2025-04-26 | Stop reason: HOSPADM

## 2025-04-24 RX ORDER — HYDROMORPHONE HCL/PF 1 MG/ML
0.5 SYRINGE (ML) INJECTION
Refills: 0 | Status: DISCONTINUED | OUTPATIENT
Start: 2025-04-24 | End: 2025-04-26 | Stop reason: HOSPADM

## 2025-04-24 RX ORDER — DEXAMETHASONE SODIUM PHOSPHATE 10 MG/ML
INJECTION, SOLUTION INTRAMUSCULAR; INTRAVENOUS AS NEEDED
Status: DISCONTINUED | OUTPATIENT
Start: 2025-04-24 | End: 2025-04-24

## 2025-04-24 RX ORDER — HYDROMORPHONE HCL/PF 1 MG/ML
0.25 SYRINGE (ML) INJECTION
Status: DISCONTINUED | OUTPATIENT
Start: 2025-04-24 | End: 2025-04-24 | Stop reason: HOSPADM

## 2025-04-24 RX ADMIN — ACETAMINOPHEN 975 MG: 325 TABLET, FILM COATED ORAL at 16:39

## 2025-04-24 RX ADMIN — ATENOLOL 50 MG: 50 TABLET ORAL at 21:16

## 2025-04-24 RX ADMIN — GABAPENTIN 300 MG: 300 CAPSULE ORAL at 10:36

## 2025-04-24 RX ADMIN — ONDANSETRON 4 MG: 2 INJECTION INTRAMUSCULAR; INTRAVENOUS at 14:02

## 2025-04-24 RX ADMIN — HYDROMORPHONE HYDROCHLORIDE 0.5 MG: 1 INJECTION, SOLUTION INTRAMUSCULAR; INTRAVENOUS; SUBCUTANEOUS at 14:32

## 2025-04-24 RX ADMIN — FENTANYL CITRATE 25 MCG: 50 INJECTION INTRAMUSCULAR; INTRAVENOUS at 14:10

## 2025-04-24 RX ADMIN — ROCURONIUM BROMIDE 10 MG: 10 INJECTION, SOLUTION INTRAVENOUS at 13:35

## 2025-04-24 RX ADMIN — FENTANYL CITRATE 25 MCG: 50 INJECTION INTRAMUSCULAR; INTRAVENOUS at 15:25

## 2025-04-24 RX ADMIN — FENTANYL CITRATE 50 MCG: 50 INJECTION INTRAMUSCULAR; INTRAVENOUS at 13:34

## 2025-04-24 RX ADMIN — MAGNESIUM SULFATE HEPTAHYDRATE 4 G: 40 INJECTION, SOLUTION INTRAVENOUS at 16:41

## 2025-04-24 RX ADMIN — FENTANYL CITRATE 25 MCG: 50 INJECTION INTRAMUSCULAR; INTRAVENOUS at 15:15

## 2025-04-24 RX ADMIN — PHENYLEPHRINE HYDROCHLORIDE 30 MCG/MIN: 10 INJECTION INTRAVENOUS at 12:23

## 2025-04-24 RX ADMIN — CEFAZOLIN SODIUM 2000 MG: 2 SOLUTION INTRAVENOUS at 12:15

## 2025-04-24 RX ADMIN — SUGAMMADEX 127 MG: 100 INJECTION, SOLUTION INTRAVENOUS at 14:17

## 2025-04-24 RX ADMIN — ACETAMINOPHEN 975 MG: 325 TABLET, FILM COATED ORAL at 10:36

## 2025-04-24 RX ADMIN — EPHEDRINE SULFATE 10 MG: 50 INJECTION, SOLUTION INTRAVENOUS at 14:11

## 2025-04-24 RX ADMIN — LIDOCAINE HYDROCHLORIDE 50 MG: 10 INJECTION, SOLUTION EPIDURAL; INFILTRATION; INTRACAUDAL; PERINEURAL at 12:10

## 2025-04-24 RX ADMIN — HEPARIN SODIUM 5000 UNITS: 5000 INJECTION INTRAVENOUS; SUBCUTANEOUS at 10:48

## 2025-04-24 RX ADMIN — PROPOFOL 150 MG: 10 INJECTION, EMULSION INTRAVENOUS at 12:10

## 2025-04-24 RX ADMIN — SODIUM CHLORIDE, SODIUM LACTATE, POTASSIUM CHLORIDE, AND CALCIUM CHLORIDE 100 ML/HR: .6; .31; .03; .02 INJECTION, SOLUTION INTRAVENOUS at 11:02

## 2025-04-24 RX ADMIN — DEXMEDETOMIDINE HYDROCHLORIDE 0.2 MCG/KG/HR: 100 INJECTION, SOLUTION INTRAVENOUS at 12:23

## 2025-04-24 RX ADMIN — DEXAMETHASONE SODIUM PHOSPHATE 10 MG: 10 INJECTION, SOLUTION INTRAMUSCULAR; INTRAVENOUS at 12:16

## 2025-04-24 RX ADMIN — SODIUM CHLORIDE: 9 INJECTION, SOLUTION INTRAVENOUS at 12:14

## 2025-04-24 RX ADMIN — PROPOFOL 50 MCG/KG/MIN: 10 INJECTION, EMULSION INTRAVENOUS at 12:23

## 2025-04-24 RX ADMIN — HYDROMORPHONE HYDROCHLORIDE 0.5 MG: 1 INJECTION, SOLUTION INTRAMUSCULAR; INTRAVENOUS; SUBCUTANEOUS at 21:15

## 2025-04-24 RX ADMIN — OXYCODONE HYDROCHLORIDE 5 MG: 5 TABLET ORAL at 19:53

## 2025-04-24 RX ADMIN — GABAPENTIN 100 MG: 100 CAPSULE ORAL at 21:16

## 2025-04-24 RX ADMIN — EPHEDRINE SULFATE 10 MG: 50 INJECTION, SOLUTION INTRAVENOUS at 13:12

## 2025-04-24 RX ADMIN — ROCURONIUM BROMIDE 10 MG: 10 INJECTION, SOLUTION INTRAVENOUS at 12:48

## 2025-04-24 RX ADMIN — SODIUM CHLORIDE, SODIUM LACTATE, POTASSIUM CHLORIDE, AND CALCIUM CHLORIDE 60 ML/HR: .6; .31; .03; .02 INJECTION, SOLUTION INTRAVENOUS at 16:40

## 2025-04-24 RX ADMIN — FENTANYL CITRATE 25 MCG: 50 INJECTION INTRAMUSCULAR; INTRAVENOUS at 12:10

## 2025-04-24 RX ADMIN — DOCUSATE SODIUM 100 MG: 100 CAPSULE, LIQUID FILLED ORAL at 16:40

## 2025-04-24 RX ADMIN — ROCURONIUM BROMIDE 50 MG: 10 INJECTION, SOLUTION INTRAVENOUS at 12:11

## 2025-04-24 RX ADMIN — FENTANYL CITRATE 25 MCG: 50 INJECTION INTRAMUSCULAR; INTRAVENOUS at 15:53

## 2025-04-24 RX ADMIN — FENTANYL CITRATE 25 MCG: 50 INJECTION INTRAMUSCULAR; INTRAVENOUS at 15:06

## 2025-04-24 NOTE — QUICK NOTE
Thoracic Surgery Postop Check:    Procedure: VATS Right Upper Lobectomy, MLND    Subjective:  No acute distress.  Resting comfortably in bed.  Endorsing some right shoulder/back pain.  Patient denies nausea, vomiting, fever, chills, shortness of breath, chest pain.  Tolerating diet.    IS 1500    R CT ss -2 -AL    Objective:  Vitals:    04/24/25 1515 04/24/25 1525 04/24/25 1530 04/24/25 1545   BP: 125/56  133/63 129/74   Pulse: (!) 51  55 (!) 53   Resp: (!) 24  20 16   Temp:       TempSrc:       SpO2: 91% 94% 96% 96%   Weight:       Height:           GENERAL: No acute distress  CV: Regular rate  LUNGS: Nonlabored respirations on RA; chest tube in place with thopaz; Incision cdi  ABDOMEN: Abdomen soft, non-tender, nondistended    ---  Mary Rivera MD  General Surgery PGY-II

## 2025-04-24 NOTE — RESPIRATORY THERAPY NOTE
RT Protocol Note  Ashley Phillips 70 y.o. 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   Tobacco Use    Smoking status: Former     Current packs/day: 0.00     Types: Cigarettes     Quit date:      Years since quittin.3     Passive exposure: Past    Smokeless tobacco: Never   Vaping Use    Vaping status: Never Used   Substance and Sexual Activity    Alcohol use: Never    Drug use: Never    Sexual activity: None   Other Topics Concern    None   Social History Narrative    None     Social Drivers of Health     Financial Resource Strain: Low Risk  (2023)    Overall Financial Resource Strain (CARDIA)     Difficulty of Paying Living Expenses: Not hard at all   Food Insecurity: No Food Insecurity (2025)    Nursing - Inadequate Food Risk Classification     Worried About Running Out of Food in the Last Year: Never true     Ran Out of Food in the Last Year: Never true     Ran Out of Food in the Last Year: Never true   Transportation Needs: No Transportation Needs (2025)    Nursing - Transportation Risk Classification     Lack of Transportation: Not on file     Lack of Transportation: No   Physical Activity: Not on file   Stress: Not on file   Social Connections: Not on file   Intimate Partner Violence: Unknown (2025)    Nursing IPS     Feels Physically and Emotionally Safe: Not on file     Physically Hurt by Someone: Not on file     Humiliated or Emotionally  "Abused by Someone: Not on file     Physically Hurt by Someone: No     Hurt or Threatened by Someone: No   Housing Stability: Unknown (2025)    Nursing: Inadequate Housing Risk Classification     Has Housing: Not on file     Worried About Losing Housing: Not on file     Unable to Get Utilities: Not on file     Unable to Pay for Housing in the Last Year: No     Has Housin       Subjective         Objective    Physical Exam:   Assessment Type: Assess only  General Appearance: Alert, Awake  Respiratory Pattern: Normal  Chest Assessment: Chest expansion symmetrical  Bilateral Breath Sounds: Clear    Vitals:  Blood pressure 130/60, pulse 67, temperature 97.6 °F (36.4 °C), temperature source Oral, resp. rate 20, height 5' 3\" (1.6 m), weight 63.5 kg (139 lb 15.9 oz), SpO2 100%.          Imaging and other studies: Results Review Statement: No pertinent imaging studies reviewed.          Plan    Respiratory Plan: Discontinue Protocol  Airway Clearance Plan: Incentive Spirometer     Resp Comments: Pt assessed at this time S/P VATS with Right Upper Lobe Lobectomy. Pt in no acute resp distress, SpO2 WNL on RA. Pt stated she has no pulmoanry HX and takes no bronchodilators at home. IS instructed and pt was able to achieve more than goal. Will cont to monitor per ACP and RCP will be D/C.   "

## 2025-04-24 NOTE — OP NOTE
OPERATIVE REPORT  PATIENT NAME: Ashley Phillips    :  1955  MRN: [id number]  Pt Location: [facility] OR ROOM 08    SURGERY DATE: 2025    Surgeons and Role:     * Myles Dominique MD - Primary     * Dariela Randolph PA-C - Assisting     * Mary Rivera MD - Assisting    Preop Diagnosis:  Adenocarcinoma of the right upper lobe  Clinical stage I    Post-Op Diagnosis Codes:  Adenocarcinoma of the right upper lobe  Clinical stage I    Procedure(s):  Right - THORACOSCOPIC RIGHT UPPER LOBE PULMONARY LOBECTOMY WITH MEDIASTINAL LYMPH NODE DISSECTION  BRONCHOSCOPY FLEXIBLE    Specimen(s):  ID Type Source Tests Collected by Time Destination   1 : Level 9 Tissue Lymph Node TISSUE EXAM Myles Dominique MD 2025 1259    2 : Level 8 Tissue Lymph Node TISSUE EXAM Myles Dominique MD 2025 1303    3 : Level 7 Tissue Lymph Node TISSUE EXAM Myles Dominique MD 2025 1305    4 : Level 11 Tissue Lymph Node TISSUE EXAM Myles Dominique MD 2025 1309    5 : Level 11 #2 Tissue Lymph Node TISSUE EXAM Myles Dominique MD 2025 1327    6 : Level 13 Tissue Lymph Node TISSUE EXAM Myles Dominique MD 2025 1339    7 :  Tissue Lung, Right Upper Lobe TISSUE EXAM Myles Dominique MD 2025 1339    8 : Level 4R Tissue Lymph Node TISSUE EXAM Myles Dominique MD 2025 1351    9 : Level 10 Tissue Lymph Node TISSUE EXAM Myles Dominique MD 2025 1353    10 : Level 2R Tissue Lymph Node TISSUE EXAM Myles Dominique MD 2025 1353        Estimated Blood Loss:   Minimal    Drains:  Chest Tube 1 Right Pleural 24 Fr. (Active)   Number of days: 0       Anesthesia Type:   General    Operative Indications:  Adenocarcinoma of the right upper lobe  Clinical stage I  Ms. Phillips has a 3 cm right upper lobe adenocarcinoma without any evidence of hilar or mediastinal adenopathy.  Her FEV1 is 2.25 L or 114% of predicted and  her uncorrected DLCO is 89% of predicted.  She has a good performance status and is brought to the operating room for resection.    Operative Findings:  R0 resection      Complications:   None    Procedure and Technique:  No suspicion or identified risk for TB or other airborne infectious disease; bronchoscopy procedure being performed for diagnostic purposes  The patient is brought to the operating room and placed in the supine position.  After institution of adequate general anesthesia, the fiberoptic bronchoscope was inserted.  The distal trachea is normal in the huy is sharp.  The airways are examined to a subsegmental level bilaterally.  There are no endobronchial lesions, the anatomy appears normal, and secretions are scant. After placement of a double-lumen endotracheal tube the patient is turned into the left lateral decubitus position. The patient's entire right chest is prepped and draped in the usual sterile fashion. Standard port incisions are utilized with a 1 cm incision in the eighth intercostal space in the posterior axillary line as well as a 4 cm incision more anteriorly. The thoracoscope is inserted and there is no evidence of pleural carcinomatosis. The mass is identified within the right upper lobe. The inferior pulmonary ligament is divided. The pleura overlying the posterior hilum was opened. Lymph nodes at levels 9 and 7 are completely dissected and sent as separate specimens. The junction between the upper lobe bronchus and the bronchus intermedius is identified in the level 11 lymph node is swept away from the bronchus up into the specimen. The lung was then retracted posteriorly and the pleura overlying the anterior hilum is opened. The middle lobe vein is clearly identified. The upper lobe component of the superior pulmonary vein was then circumferentially dissected and divided with a linear stapler. This exposes the underlying pulmonary artery and the lymphatics between the ongoing  pulmonary artery and the truncus anterior are divided and swept up into the specimen. The truncus anterior is then circumferentially dissected and divided with a linear stapler. A level 12 lymph node is sampled. The level  11 lymph node is sampled. The upper-lobe bronchus is circumferentially dissected and divided with a linear stapler. The distal divided end of the bronchial stump is grasped and retracted laterally exposing the posterior ascending artery. This is circumferentially dissected and divided with a linear stapler. The plane overlying the ongoing pulmonary artery is dissected to completely undermine the fissure. Finally, the fissure is divided first by dividing the horizontal fissure followed by the posterior portion of the oblique fissure. The specimen is then placed inside of a protective bag and removed from the body. The pleura above the azygos vein is then opened and both levels 4R and 2R are dissected for permanent sections. Hemostasis is assured.  Paravertebral blocks are then placed with 20 cc of 0.25% Marcaine mixed with 20 cc of Exparel over 8 levels. A 24 Yoruba chest tube was then placed through the camera port to lie posteriorly and upwards the apex. The remaining lung is then watched as it reexpands. The anterior access incision was then closed in layers with running absorbable suture to the pectoral fascia, the subcutaneous and subcuticular layers. The chest tube is affixed to the chest wall and placed to [place] drainage. Dry sterile dressings were applied, the patient was extubated, and transferred to the thoracic recovery area. I was present and scrubbed for the entire procedure. The physician assistant was critical to the performance of this operation. She was scrubbed for the entire procedure, drove the thoracoscope, provided retraction, as well as wound closure.   I was present for the entire procedure.    Patient Disposition:  PACU              SIGNATURE: Myles Dominique MD  DATE:  April 24, 2025  TIME: 2:19 PM

## 2025-04-24 NOTE — INTERVAL H&P NOTE
H&P reviewed. After examining the patient I find no changes in the patients condition since the H&P had been written.    Vitals:    04/24/25 1028   BP: (!) 186/73   Pulse: 55   Resp: 16   Temp: 97.7 °F (36.5 °C)   SpO2: 99%

## 2025-04-24 NOTE — ANESTHESIA POSTPROCEDURE EVALUATION
Post-Op Assessment Note    CV Status:  Stable    Pain management: adequate       Mental Status:  Alert and awake   Hydration Status:  Euvolemic and stable   PONV Controlled:  None   Airway Patency:  Patent and adequate     Post Op Vitals Reviewed: Yes    No anethesia notable event occurred.    Staff: Anesthesiologist, with CRNAs           Last Filed PACU Vitals:  Vitals Value Taken Time   Temp 97.3 °F (36.3 °C) 04/24/25 1458   Pulse 51 04/24/25 1608   /74 04/24/25 1545   Resp 15 04/24/25 1608   SpO2 98 % 04/24/25 1608   Vitals shown include unfiled device data.    Modified Jose:     Vitals Value Taken Time   Activity 2 04/24/25 1530   Respiration 2 04/24/25 1530   Circulation 2 04/24/25 1530   Consciousness 2 04/24/25 1530   Oxygen Saturation 2 04/24/25 1530     Modified Jose Score: 10

## 2025-04-24 NOTE — DISCHARGE INSTR - AVS FIRST PAGE
Lobectomy    During this hospitalization you underwent a lobectomy. Your discharge instructions are below.     Incision Care:  - Your incisions were closed with stitches underneath of the skin which will dissolve. There is purple surgical glue on top of the incisions. The surgical glue will flake off over the next few weeks. There is a non-dissolvable stitch at your chest tube site which will be removed in the office.   - After your chest tube was removed, a gauze dressing was placed over the incision. This gauze can be removed in 24 hours. After this time you may place some gauze over your chest tube site if it is leaking some fluid.  - You do not need dressings over your other incisions.  Do not apply any cream or ointments to the incisions unless instructed by your surgeon.  - You may shower daily - no soaking in a tub bath. Wash your incisions gently with soap and water and pat dry. Do not scrub the incisions.  - Bruising at your incisions is normal. This will resolve over the next few weeks.     Activity  - No lifting greater than 10lbs until you are evaluated in the office.   - Walking and light activity is encouraged. Recommend you are active during the day and using your Incentive Spirometer when you are sitting for a prolonged period.   - No driving while you are using narcotic pain medications. If you are no longer taking narcotics and considering driving, you must make sure you can quickly react to changes on the road. This can be challenging in the first few weeks after surgery.     Pain:  - Some degree of pain or discomfort after surgery is expected. This pain may become more noticeable after discharge as you start moving around more.   - Your pain regiment includes the following:   - Tylenol 650 mg tablet. Take 1 tablet, every 6 hours for 1 week.    - Gabapentin 100 mg tablet. Take 1 tablet, 3x a day for 3 weeks.    - Oxycodone (Narcotic) 5mg tablet. Take 1 tablet, every 4-6 hours as needed for pain.      Medications:  - Continue on your home medications including any blood thinner and aspirin, as instructed.     Diet:  - You should continue on your normal diet.     Bowel Regiment:  - Constipation after surgery while on narcotic pain medications is normal.  - You should continue taking a bowel regiment while on a narcotic pain medication to keep your bowel movements soft. Your discharge bowel regiment:   - Docusate (Colace) 100mg tablet. Take 1 tablet, twice a day.    - Senna 8.6mg tablet. Take 1 tablet daily.   - If your bowel movements become lose, stop taking the bowel regiment above.     Follow-up:  - You have a scheduled follow-up appointment on: 5/6/25 at 10:40 am  - Obtain your Chest X-ray prior to your scheduled post-operative appointment.   - A couple of days after discharge our office will call you to check in with how you are recovering at home.     Concerns:  - Call the office for any questions or concerns. Many postoperative issues can be sorted out over the phone.   - Call the office or go to the Emergency Department if you develop a fever greater than 101, persistent chills, persistent nausea/vomiting, worsening or uncontrolled pain, and/or increasing redness or foul smelling drainage from an incision.     Thoracic Surgery Office: [phone]    Dr. William Burfeind, MD Rachael Hart, PA-C Dr. Meredith Harrison, MD Amylyn Mortimer, PA-C Dr. Dustin Manchester, MD Dr. Stephen Dingley, DO

## 2025-04-24 NOTE — ANESTHESIA POSTPROCEDURE EVALUATION
Post-Op Assessment Note    CV Status:  Stable  Pain Score: 1    Pain management: adequate       Mental Status:  Alert and awake   Hydration Status:  Euvolemic   PONV Controlled:  Controlled   Airway Patency:  Patent     Post Op Vitals Reviewed: Yes    No anethesia notable event occurred.    Staff: CRNA           Last Filed PACU Vitals:  Vitals Value Taken Time   Temp 97.3 °F (36.3 °C) 04/24/25 1458   Pulse 54    /65    Resp 18    SpO2 99

## 2025-04-25 ENCOUNTER — APPOINTMENT (INPATIENT)
Dept: RADIOLOGY | Facility: HOSPITAL | Age: 70
DRG: 164 | End: 2025-04-25
Payer: MEDICARE

## 2025-04-25 LAB
ANION GAP SERPL CALCULATED.3IONS-SCNC: 9 MMOL/L (ref 4–13)
BUN SERPL-MCNC: 14 MG/DL (ref 5–25)
CALCIUM SERPL-MCNC: 9 MG/DL (ref 8.4–10.2)
CHLORIDE SERPL-SCNC: 101 MMOL/L (ref 96–108)
CO2 SERPL-SCNC: 25 MMOL/L (ref 21–32)
CREAT SERPL-MCNC: 1.02 MG/DL (ref 0.6–1.3)
ERYTHROCYTE [DISTWIDTH] IN BLOOD BY AUTOMATED COUNT: 13.5 % (ref 11.6–15.1)
GFR SERPL CREATININE-BSD FRML MDRD: 55 ML/MIN/1.73SQ M
GLUCOSE SERPL-MCNC: 128 MG/DL (ref 65–140)
HCT VFR BLD AUTO: 33 % (ref 34.8–46.1)
HGB BLD-MCNC: 11 G/DL (ref 11.5–15.4)
MAGNESIUM SERPL-MCNC: 2.4 MG/DL (ref 1.9–2.7)
MCH RBC QN AUTO: 30.4 PG (ref 26.8–34.3)
MCHC RBC AUTO-ENTMCNC: 33.3 G/DL (ref 31.4–37.4)
MCV RBC AUTO: 91 FL (ref 82–98)
PLATELET # BLD AUTO: 108 THOUSANDS/UL (ref 149–390)
PMV BLD AUTO: 11.1 FL (ref 8.9–12.7)
POTASSIUM SERPL-SCNC: 4.6 MMOL/L (ref 3.5–5.3)
RBC # BLD AUTO: 3.62 MILLION/UL (ref 3.81–5.12)
SODIUM SERPL-SCNC: 135 MMOL/L (ref 135–147)
WBC # BLD AUTO: 7.49 THOUSAND/UL (ref 4.31–10.16)

## 2025-04-25 PROCEDURE — 97163 PT EVAL HIGH COMPLEX 45 MIN: CPT

## 2025-04-25 PROCEDURE — 80048 BASIC METABOLIC PNL TOTAL CA: CPT | Performed by: THORACIC SURGERY (CARDIOTHORACIC VASCULAR SURGERY)

## 2025-04-25 PROCEDURE — 99024 POSTOP FOLLOW-UP VISIT: CPT | Performed by: THORACIC SURGERY (CARDIOTHORACIC VASCULAR SURGERY)

## 2025-04-25 PROCEDURE — 71046 X-RAY EXAM CHEST 2 VIEWS: CPT

## 2025-04-25 PROCEDURE — 97166 OT EVAL MOD COMPLEX 45 MIN: CPT

## 2025-04-25 PROCEDURE — 85027 COMPLETE CBC AUTOMATED: CPT | Performed by: THORACIC SURGERY (CARDIOTHORACIC VASCULAR SURGERY)

## 2025-04-25 PROCEDURE — 83735 ASSAY OF MAGNESIUM: CPT | Performed by: THORACIC SURGERY (CARDIOTHORACIC VASCULAR SURGERY)

## 2025-04-25 RX ORDER — GABAPENTIN 100 MG/1
100 CAPSULE ORAL 3 TIMES DAILY
Qty: 63 CAPSULE | Refills: 0 | Status: SHIPPED | OUTPATIENT
Start: 2025-04-25 | End: 2025-05-02 | Stop reason: SDUPTHER

## 2025-04-25 RX ORDER — OXYCODONE HYDROCHLORIDE 5 MG/1
5 TABLET ORAL EVERY 6 HOURS PRN
Qty: 20 TABLET | Refills: 0 | Status: SHIPPED | OUTPATIENT
Start: 2025-04-25 | End: 2025-05-02 | Stop reason: ALTCHOICE

## 2025-04-25 RX ORDER — SENNOSIDES 8.6 MG
650 CAPSULE ORAL EVERY 6 HOURS
Qty: 28 TABLET | Refills: 0 | Status: SHIPPED | OUTPATIENT
Start: 2025-04-25 | End: 2025-05-02

## 2025-04-25 RX ADMIN — DOCUSATE SODIUM 100 MG: 100 CAPSULE, LIQUID FILLED ORAL at 17:38

## 2025-04-25 RX ADMIN — HYDROMORPHONE HYDROCHLORIDE 0.5 MG: 1 INJECTION, SOLUTION INTRAMUSCULAR; INTRAVENOUS; SUBCUTANEOUS at 21:27

## 2025-04-25 RX ADMIN — OXYCODONE HYDROCHLORIDE 5 MG: 5 TABLET ORAL at 19:12

## 2025-04-25 RX ADMIN — GABAPENTIN 100 MG: 100 CAPSULE ORAL at 08:47

## 2025-04-25 RX ADMIN — ALLOPURINOL 100 MG: 100 TABLET ORAL at 08:47

## 2025-04-25 RX ADMIN — ACETAMINOPHEN 975 MG: 325 TABLET, FILM COATED ORAL at 12:23

## 2025-04-25 RX ADMIN — AMLODIPINE BESYLATE 2.5 MG: 2.5 TABLET ORAL at 08:47

## 2025-04-25 RX ADMIN — OXYCODONE HYDROCHLORIDE 5 MG: 5 TABLET ORAL at 15:00

## 2025-04-25 RX ADMIN — ATENOLOL 50 MG: 50 TABLET ORAL at 08:47

## 2025-04-25 RX ADMIN — GABAPENTIN 100 MG: 100 CAPSULE ORAL at 21:27

## 2025-04-25 RX ADMIN — DOCUSATE SODIUM 100 MG: 100 CAPSULE, LIQUID FILLED ORAL at 08:47

## 2025-04-25 RX ADMIN — OXYCODONE HYDROCHLORIDE 5 MG: 5 TABLET ORAL at 05:45

## 2025-04-25 RX ADMIN — GABAPENTIN 100 MG: 100 CAPSULE ORAL at 15:00

## 2025-04-25 RX ADMIN — SENNOSIDES 8.6 MG: 8.6 TABLET, FILM COATED ORAL at 08:47

## 2025-04-25 RX ADMIN — OXYCODONE HYDROCHLORIDE 5 MG: 5 TABLET ORAL at 00:38

## 2025-04-25 RX ADMIN — ENOXAPARIN SODIUM 40 MG: 40 INJECTION SUBCUTANEOUS at 08:55

## 2025-04-25 RX ADMIN — ACETAMINOPHEN 975 MG: 325 TABLET, FILM COATED ORAL at 00:38

## 2025-04-25 RX ADMIN — LEVOTHYROXINE SODIUM 100 MCG: 100 TABLET ORAL at 05:45

## 2025-04-25 RX ADMIN — ACETAMINOPHEN 975 MG: 325 TABLET, FILM COATED ORAL at 17:38

## 2025-04-25 RX ADMIN — Medication 3 MG: at 21:27

## 2025-04-25 RX ADMIN — ACETAMINOPHEN 975 MG: 325 TABLET, FILM COATED ORAL at 05:45

## 2025-04-25 NOTE — PHYSICAL THERAPY NOTE
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    Wheeze    Primary osteoarthritis of right hip    Biliary colic    Hypertensive crisis    Primary adenocarcinoma of upper lobe of right lung (HCC)    Elevated bilirubin    Other constipation    Umbilical hernia without obstruction and without gangrene       Past Medical History  Past Medical History:   Diagnosis Date    Chronic ITP (idiopathic thrombocytopenia) (HCC)     Disease of thyroid gland     Hypertension        Past Surgical History  Past Surgical History:   Procedure Laterality Date    CHOLECYSTECTOMY LAPAROSCOPIC N/A 02/26/2025    Procedure: CHOLECYSTECTOMY LAPAROSCOPIC; REPAIR OF SUPRAUMBILICAL HERNIA;  Surgeon: Eduard Salcido DO;  Location: [facility];  Service: General    COLONOSCOPY  10/26/2017    Dr. Guevara - polyp in sigmoid colon removed; moderate diverticulosis in sigmoid colon. Bx: tubular adenoma.    COLONOSCOPY  08/03/2012    Dr. Guevara - polyp found in sigmoid colon removed. Moderately severe diverticulosis in the sigmoid colon. Bx: tubular adenoma.    CYST REMOVAL  2015    Mouth ( Upper) - done by oral surgeon    GANGLION CYST EXCISION Right 1999    Right wrist    HYSTERECTOMY      LUNG LOBECTOMY Right 4/24/2025    Procedure: THORACOSCOPIC RIGHT UPPER LOBE PULMONARY LOBECTOMY WITH MEDIASTINAL LYMPH NODE DISSECTION;  Surgeon: Myles Dominique MD;  Location:  MAIN OR;  Service: Thoracic    AZ BRNCHSC INCL FLUOR GDNCE DX W/CELL WASHG SPX N/A 4/24/2025    Procedure:  BRONCHOSCOPY FLEXIBLE;  Surgeon: Myles Dominique MD;  [facility];  Service: Thoracic    IN THORACOSCOPY W/LOBECTOMY SINGLE LOBE Right 4/24/2025    Procedure: THORACOSCOPY VIDEO ASSISTED SURGERY (VATS);  Surgeon: Mlyes Dominique MD;  [facility]: [facility];  Service: Thoracic    THYROIDECTOMY, PARTIAL Left 2004    papillary carcinoma of the thyroid    TOTAL THYROIDECTOMY Bilateral 2009    Hashimotos          04/25/25 0835   PT Last Visit   PT Visit Date 04/25/25   Note Type   Note type Evaluation   Additional Comments 70 y.o. female admitted to [facility] on 4/24/2025 with Primary adenocarcinoma of upper lobe of right lung (HCC).   Pain Assessment   Pain Assessment Tool 0-10   Pain Score 3   Pain Location/Orientation Orientation: Right;Location: Incision   Pain Onset/Description Onset: Ongoing   Effect of Pain on Daily Activities limits functional mobility   Patient's Stated Pain Goal No pain   Hospital Pain Intervention(s) Repositioned;Ambulation/increased activity;Emotional support;Rest   Restrictions/Precautions   Weight Bearing Precautions Per Order No   Other Precautions Chair Alarm;Bed Alarm;Multiple lines;Telemetry;Fall Risk;Pain  (CT)   Home Living   Type of Home House   Home Layout Two level;Bed/bath upstairs;Stairs to enter with rails  (split level, 6 + 6 steps to bedroom/bathroom)   Bathroom Shower/Tub Tub/shower unit   Bathroom Toilet Standard   Bathroom Equipment Other (Comment)  (no DME)   Bathroom Accessibility Accessible   Home Equipment Walker;Cane  (does not use)   Additional Comments At baseline, pt resides with spouse in split level home with 0 IRENE and 6+6 steps to bedroom/bathroom and was independent ADLs prior to hospital admission.   Prior Function   Level of [place] Independent with ADLs;Independent with functional mobility;Independent with IADLS   Lives With Spouse   Receives Help From Family;Friend(s)   IADLs Independent with driving;Independent  "with meal prep;Independent with medication management   Falls in the last 6 months 0   Vocational Retired   Comments retired nurse   General   Additional Pertinent History Patient  has a past medical history of Chronic ITP (idiopathic thrombocytopenia) (HCC), Disease of thyroid gland, and Hypertension.   Family/Caregiver Present No   Cognition   Overall Cognitive Status WFL   Arousal/Participation Alert   Orientation Level Oriented X4   Memory Within functional limits   Following Commands Follows one step commands without difficulty   Comments patient very pleasant and cooperative, good insight of functional deficits and safety awareness   Subjective   Subjective \"I feel okay, I was supposed to get a hip replacement then all of this happened\"   RLE Assessment   RLE Assessment   (4-/5 grossly)   LLE Assessment   LLE Assessment   (4-/5 grossly)   Bed Mobility   Supine to Sit 5  Supervision   Additional items HOB elevated;Bedrails;Increased time required;Verbal cues   Sit to Supine Unable to assess   Additional Comments patient left OOB in bedside chair with alarm and all needs met   Transfers   Sit to Stand 5  Supervision   Additional items Increased time required;Verbal cues   Stand to Sit 5  Supervision   Additional items Increased time required;Verbal cues   Additional Comments no AD   Ambulation/Elevation   Gait pattern Forward Flexion;Decreased foot clearance;Inconsistent james;Short stride;Excessively slow   Gait Assistance 5  Supervision   Additional items Verbal cues;Tactile cues   Assistive Device None   Distance 100'x2   Stair Management Assistance Not tested   Ambulation/Elevation Additional Comments Patient ambulated 100'x2 with no AD, requiring occasional verbal cues for hallway navigation, and improving step/stride length. Patient ambulated with slowed gait speed and mild sway, improving with further ambulation. Deferred stair negotiation at this time, trial next treatment session as able and " appropriate.   Balance   Static Sitting Good   Dynamic Sitting Fair +   Static Standing Fair   Dynamic Standing Fair   Ambulatory Fair -   Endurance Deficit   Endurance Deficit Yes   Endurance Deficit Description generalized weakness, fatigue, pain   Activity Tolerance   Activity Tolerance Patient limited by fatigue;Patient limited by pain   Medical Staff Made Aware FLY Alexis; This high complexity evaluation was performed with an occupational therapist due to the patient's co-morbidities, clinically unstable presentation, and present impairments which are a regression from the patient's baseline.   Nurse Made Aware RN cleared and updated   Assessment   Prognosis Good   Problem List Decreased strength;Decreased range of motion;Decreased endurance;Impaired balance;Decreased skin integrity;Pain   Assessment PT completed evaluation of 70 y.o. female admitted to [facility] on 4/24/2025 with Primary adenocarcinoma of upper lobe of right lung (HCC). Patient's current status instabilities include s/p status post 4/24 VATS right upper lobectomy with mediastinal lymph node dissection, multiple lines, CT, ongoing pain, continuous O2/HR monitoring, regression in function from baseline. Patient  has a past medical history of Chronic ITP (idiopathic thrombocytopenia) (HCC), Disease of thyroid gland, and Hypertension. At baseline, pt resides with spouse in split level home with 0 IRENE and 6+6 steps to bedroom/bathroom and was independent ADLs prior to hospital admission. This patient is a step down patient and requires increased time for safe management of lines pre/post/during mobility. Vital signs monitored throughout treatment session.       Patient presents at time of PT evaluation functioning below baseline and currently w/ overall mobility deficits due to: impaired balance, decreased endurance, gait dysfunction, decreased activity tolerance and fall risk. During PT evaluation, patient currently is requiring  supervision for bed mobility skills;  supervision for functional transfers and  supervision for ambulation with no AD. Patient performed supine to sit to edge of bed requiring HOB elevated, verbal cues for set up, increased time, use of bed rails. Patient ambulated 100'x2 with no AD, requiring occasional verbal cues for hallway navigation, and improving step/stride length. Patient ambulated with slowed gait speed and mild sway, improving with further ambulation. Deferred stair negotiation at this time, trial next treatment session as able and appropriate. Pt left OOB in bedside chair with alarm and all needs met.       Patient will benefit from continued skilled PT this admission to achieve maximal function and safety. The patients AM-PAC Basic Mobility Inpatient Short From Raw Score is 17 . Based on AM-PAC scoring and patient presentation, PT currently recommending No Post Acute Rehab Needs. Please also refer to the recommendation of the Physical Therapist for safe discharge planning.   Barriers to Discharge None   Goals   Patient Goals to return home   STG Expiration Date 05/09/25   Short Term Goal #1 1) Perform bed mobility mod-I to participate in frequent repositioning and improve skin integrity; 2) Perform functional transfers mod-I to promote I with toileting and OOB mobility; 3) Ambulate 200 feet mod-I with least restrictive device to participate in household and community level mobility; 4) Improve b/l LE strength by 1/2 grade in order to improve efficiency of transfers; 5) Improve balance by 1 grade to reduce risk for falls; 6) Improve overall activity tolerance to 60 minutes in order to increase patient's ability to engage in mobility tasks; 7) Navigate 12 steps S level in order to safely navigate multiple floors at home   PT Treatment Day 0   Plan   Treatment/Interventions ADL retraining;Functional transfer training;LE strengthening/ROM;Elevations;Therapeutic exercise;Endurance training;Patient/family  training;Bed mobility;Gait training;Spoke to nursing;Spoke to case management;OT   PT Frequency 1-2x/wk   Discharge Recommendation   Rehab Resource Intensity Level, PT No post-acute rehabilitation needs   AM-PAC Basic Mobility Inpatient   Turning in Flat Bed Without Bedrails 3   Lying on Back to Sitting on Edge of Flat Bed Without Bedrails 3   Moving Bed to Chair 3   Standing Up From Chair Using Arms 3   Walk in Room 3   Climb 3-5 Stairs With Railing 2   Basic Mobility Inpatient Raw Score 17   Basic Mobility Standardized Score 39.67   [facility] Highest Level Of Mobility   -HLM Goal 5: Stand one or more mins   -HLM Achieved 7: Walk 25 feet or more   End of Consult   Patient Position at End of Consult Bedside chair;Bed/Chair alarm activated;All needs within reach         Carly Rhodes, PT, DPT

## 2025-04-25 NOTE — PROGRESS NOTES
Progress Note - Thoracic    Name: Ashley Phillips 70 y.o. female I MRN: [id number]  Unit/Bed#: [facility] 506-01 I Date of Admission: 4/24/2025   Date of Service: 4/25/2025 I Hospital Day: 1    Assessment & Plan  Primary adenocarcinoma of upper lobe of right lung (HCC)  70year old female status post 4/24 VATS right upper lobectomy with mediastinal lymph node dissection     cc serosanguineous  -2, -AL with cough or Valsalva    Plan:   - Regular diet  - Discontinue IV fluids  - Discontinue chest tube  - Post-pull PA/lat  - Pain and nausea control PRN  - Encourage IS, ambulation   - PT/OT eval and treat   - Appreciate CM for dispo planning   - DVT ppx     Subjective   Pain controlled.  Some numbness on right side of chest.  Tolerating p.o.  Patient denies nausea, vomiting, fever, chills, shortness of breath, chest pain. Voiding.    Objective :  Temp:  [97.3 °F (36.3 °C)-99.3 °F (37.4 °C)] 99.3 °F (37.4 °C)  HR:  [51-72] 64  BP: (107-186)/(52-77) 137/64  Resp:  [16-24] 18  SpO2:  [91 %-100 %] 100 %  O2 Device: None (Room air)    I/O         04/23 0701  04/24 0700 04/24 0701  04/25 0700    I.V. (mL/kg)  1200 (18.9)    IV Piggyback  50    Total Intake(mL/kg)  1250 (19.7)    Chest Tube  150    Total Output  150    Net  +1100                Lines/Drains/Airways       Active Status       Name Placement date Placement time Site Days    Chest Tube 1 Right Pleural 24 Fr. 04/24/25  1414  Pleural  less than 1                  Physical Exam   Gen: NAD, Resting in bed  Neuro: A&O, No focal deficits  Head: Normal Cephalic, Atraumatic  Eye: EOMI, No scleral icterus  CV: Regular rate, Cap refill <2 sec  Pulm: Normal work of breathing, No respiratory distress  Chest tube in place, incisions clean, dry, intact -surrounding ecchymosis  Abd: Soft, Non-tender, Non-distended  Ext: No edema bilateral lower extremities, Non-tender  Skin: Warm, Dry, Intact      Lab Results: I have reviewed the following results:  Recent Labs      04/25/25  0248   WBC 7.49   HGB 11.0*   HCT 33.0*   *   SODIUM 135   K 4.6      CO2 25   BUN 14   CREATININE 1.02   GLUC 128   MG 2.4     XR chest portable   Final Result by Trevor Pan MD (04/24 2103)      Postsurgical changes in the right lung field. Otherwise no no acute cardiopulmonary disease.            Workstation performed: [id number]           VTE Pharmacologic Prophylaxis: VTE covered by:  enoxaparin, Subcutaneous      VTE Mechanical Prophylaxis: sequential compression device    ---  Mary Rivera MD  General Surgery PGY-II

## 2025-04-25 NOTE — PLAN OF CARE
Problem: PHYSICAL THERAPY ADULT  Goal: Performs mobility at highest level of function for planned discharge setting.  See evaluation for individualized goals.  Description: Treatment/Interventions: ADL retraining, Functional transfer training, LE strengthening/ROM, Elevations, Therapeutic exercise, Endurance training, Patient/family training, Bed mobility, Gait training, Spoke to nursing, Spoke to case management, OT          See flowsheet documentation for full assessment, interventions and recommendations.  Note: Prognosis: Good  Problem List: Decreased strength, Decreased range of motion, Decreased endurance, Impaired balance, Decreased skin integrity, Pain  Assessment: PT completed evaluation of 70 y.o. female admitted to [facility] on 4/24/2025 with Primary adenocarcinoma of upper lobe of right lung (HCC). Patient's current status instabilities include s/p status post 4/24 VATS right upper lobectomy with mediastinal lymph node dissection, multiple lines, CT, ongoing pain, continuous O2/HR monitoring, regression in function from baseline. Patient  has a past medical history of Chronic ITP (idiopathic thrombocytopenia) (HCC), Disease of thyroid gland, and Hypertension. At baseline, pt resides with spouse in split level home with 0 IRENE and 6+6 steps to bedroom/bathroom and was independent ADLs prior to hospital admission. This patient is a step down patient and requires increased time for safe management of lines pre/post/during mobility. Vital signs monitored throughout treatment session. Patient presents at time of PT evaluation functioning below baseline and currently w/ overall mobility deficits due to: impaired balance, decreased endurance, gait dysfunction, decreased activity tolerance and fall risk. During PT evaluation, patient currently is requiring supervision for bed mobility skills;  supervision for functional transfers and  supervision for ambulation with no AD. Patient performed supine to  sit to edge of bed requiring HOB elevated, verbal cues for set up, increased time, use of bed rails. Patient ambulated 100'x2 with no AD, requiring occasional verbal cues for hallway navigation, and improving step/stride length. Patient ambulated with slowed gait speed and mild sway, improving with further ambulation. Deferred stair negotiation at this time, trial next treatment session as able and appropriate. Pt left OOB in bedside chair with alarm and all needs met. Patient will benefit from continued skilled PT this admission to achieve maximal function and safety. The patients AM-PAC Basic Mobility Inpatient Short From Raw Score is 17 . Based on AM-PAC scoring and patient presentation, PT currently recommending No Post Acute Rehab Needs. Please also refer to the recommendation of the Physical Therapist for safe discharge planning.  Barriers to Discharge: None     Rehab Resource Intensity Level, PT: No post-acute rehabilitation needs    See flowsheet documentation for full assessment.

## 2025-04-25 NOTE — ASSESSMENT & PLAN NOTE
70year old female status post 4/24 VATS right upper lobectomy with mediastinal lymph node dissection     cc serosanguineous  -2, -AL with cough or Valsalva    Plan:   - Regular diet  - Discontinue IV fluids  - Discontinue chest tube  - Post-pull PA/lat  - Pain and nausea control PRN  - Encourage IS, ambulation   - PT/OT eval and treat   - Appreciate CM for dispo planning   - DVT ppx

## 2025-04-25 NOTE — OCCUPATIONAL THERAPY NOTE
Occupational Therapy Evaluation     Patient Name: Ashley Phillips  Today's Date: 4/25/2025  Problem List  Principal Problem:    Primary adenocarcinoma of upper lobe of right lung (HCC)  Active Problems:    Autoimmune thrombocytopenia (HCC)    Past Medical History  Past Medical History:   Diagnosis Date    Chronic ITP (idiopathic thrombocytopenia) (HCC)     Disease of thyroid gland     Hypertension      Past Surgical History  Past Surgical History:   Procedure Laterality Date    CHOLECYSTECTOMY LAPAROSCOPIC N/A 02/26/2025    Procedure: CHOLECYSTECTOMY LAPAROSCOPIC; REPAIR OF SUPRAUMBILICAL HERNIA;  Surgeon: Eduard Salcido DO;  Location: [facility];  Service: General    COLONOSCOPY  10/26/2017    Dr. Guevara - polyp in sigmoid colon removed; moderate diverticulosis in sigmoid colon. Bx: tubular adenoma.    COLONOSCOPY  08/03/2012    Dr. Guevara - polyp found in sigmoid colon removed. Moderately severe diverticulosis in the sigmoid colon. Bx: tubular adenoma.    CYST REMOVAL  2015    Mouth ( Upper) - done by oral surgeon    GANGLION CYST EXCISION Right 1999    Right wrist    HYSTERECTOMY      LUNG LOBECTOMY Right 4/24/2025    Procedure: THORACOSCOPIC RIGHT UPPER LOBE PULMONARY LOBECTOMY WITH MEDIASTINAL LYMPH NODE DISSECTION;  Surgeon: Myles Dominique MD;  [facility];  Service: Thoracic    [place] [facility] INCL FLUOR GDNCE DX W/CELL WASHG SPX N/A 4/24/2025    Procedure: BRONCHOSCOPY FLEXIBLE;  Surgeon: Myles Dominique MD;  [facility];  Service: Thoracic    TN THORACOSCOPY W/LOBECTOMY SINGLE LOBE Right 4/24/2025    Procedure: THORACOSCOPY VIDEO ASSISTED SURGERY (VATS);  Surgeon: Myles Dominique MD;  [facility];  Service: Thoracic    THYROIDECTOMY, PARTIAL Left 2004    papillary carcinoma of the thyroid    TOTAL THYROIDECTOMY Bilateral 2009    Hashimotos        OCCUPATIONAL THERAPY           04/25/25 0836   OT Last Visit   OT Visit Date 04/25/25   Note  "Type   Note type Evaluation   Pain Assessment   Pain Assessment Tool 0-10   Pain Score 3   Pain Location/Orientation Location: Back   Hospital Pain Intervention(s) Repositioned;Emotional support   Restrictions/Precautions   Weight Bearing Precautions Per Order No   Other Precautions Chair Alarm;Bed Alarm;Multiple lines;Telemetry;Fall Risk;Pain  (Chest Tube to water seal)   Home Living   Type of Home House   Home Layout Two level;Bed/bath upstairs;Stairs to enter with rails  (A Split Level with no steps to enter however 6 steps to first level and 6 steps to 2nd level)   Bathroom Shower/Tub Tub/shower unit   Bathroom Toilet Standard   Bathroom Accessibility Accessible   Home Equipment Walker;Cane  (pt reports has walker and cane however does not utilize them)   Prior Function   Level of [place] Independent with ADLs;Independent with functional mobility;Independent with IADLS   Lives With Spouse   Receives Help From Family;Friend(s)   IADLs Independent with driving;Independent with meal prep;Independent with medication management   Falls in the last 6 months 0   Vocational 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   Lifestyle   Reciprocal Relationships Supportive    Service to Others Retired and provides 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   Subjective   Subjective \"I don't need to use this \" pt referring to RW   ADL   Where Assessed Other (Comment)   Grooming Assistance 5  Supervision/Setup   Grooming Deficit Setup   UB Bathing Assistance 5  Supervision/Setup   UB Bathing Deficit Setup   LB Bathing Assistance 5  Supervision/Setup   LB Bathing Deficit Setup   UB Dressing Assistance 5  Supervision/Setup   UB Dressing Deficit Setup   LB Dressing Assistance 5  Supervision/Setup   LB Dressing Deficit Setup   Toileting Assistance  5  Supervision/Setup   Toileting Deficit Setup   Bed Mobility   Supine to Sit 5  Supervision   Additional items Increased time required   Additional Comments Post session Pt set up to bedside recliner "   Transfers   Sit to Stand 5  Supervision   Additional items Increased time required   Stand to Sit 5  Supervision   Additional items Increased time required   Additional Comments no Adaptive Device   Functional Mobility   Functional Mobility 5  Supervision   Additional Comments Community Distances without AD   Balance   Static Sitting Good   Dynamic Sitting Fair +   Static Standing Fair   Dynamic Standing Fair   Ambulatory Fair -   Activity Tolerance   Activity Tolerance Patient limited by fatigue;Patient limited by pain   Medical Staff Made Aware PT-Carly   Nurse Made Aware RN/Anjali cleared pt for participation in therapy   RUE Assessment   RUE Assessment WFL   LUE Assessment   LUE Assessment WFL   Cognition   Overall Cognitive Status WFL   Arousal/Participation Alert;Responsive;Cooperative   Attention Within functional limits   Orientation Level Oriented X4   Memory Within functional limits   Following Commands Follows one step commands without difficulty   Comments pt was pleasant and cooperative with all therapy requests. pt demonstrated good understanding/insight to her functional deficits.   Assessment   Prognosis Good   Assessment Pt is a 70 y.o. female seen for Occupational Therapy Evaluation due to pt noted for decreased activity tolerance and increased assist required for pt to participate in and perform functional activities. Pt  was admitted to [facility] on 4/24/2025 with Primary adenocarcinoma of upper lobe of right lung (HCC)  NSCLC of right lung (HCC) (C34.91). Pt now s/p THORACOSCOPIC RIGHT UPPER LOBE PULMONARY LOBECTOMY WITH MEDIASTINAL LYMPH NODE DISSECTION and + active OT orders. Patient   has a past medical history of Chronic ITP (idiopathic thrombocytopenia) (HCC), Disease of thyroid gland, and Hypertension.  At baseline pt was completing all ADLs  and functional mobility Independently as well completed IADLs Independently. Pt  has no DME  and + . Pt lives with spouse in  a Split Level Home with no steps to enter and 6 steps to first floor level and 6 steps to second level. Pt currently requires SBA for overall ADLS and for functional mobility/transfers. The patient's raw score on the -PAC Daily Activity Inpatient Short Form is 22. A raw score of greater than or equal to 19 suggests the patient may benefit from discharge to home. Please refer to the recommendation of the Occupational Therapist for safe discharge planning. From OT standpoint, pt is recommend for No post-acute rehabilitation services/ needs upon D/C at this time. OT anticipates pt to d/c home with family support and Recommends pt to continue participation in ADLS and functional mobility with hospital staff. No further acute OT needs, d/c OT.   Goals   Patient Goals to return home with    Plan   OT Frequency Eval only   Discharge Recommendation   Rehab Resource Intensity Level, OT No post-acute rehabilitation needs   AM-PAC Daily Activity Inpatient   Lower Body Dressing 3   Bathing 3   Toileting 4   Upper Body Dressing 4   Grooming 4   Eating 4   Daily Activity Raw Score 22   Daily Activity Standardized Score (Calc for Raw Score >=11) 47.1   End of Consult   Patient Position at End of Consult Bed/Chair alarm activated;All needs within reach   Nurse Communication Nurse aware of consult         Vanesa Denton OTR/L

## 2025-04-26 VITALS
RESPIRATION RATE: 16 BRPM | BODY MASS INDEX: 24.1 KG/M2 | DIASTOLIC BLOOD PRESSURE: 66 MMHG | TEMPERATURE: 100.2 F | HEIGHT: 63 IN | HEART RATE: 73 BPM | OXYGEN SATURATION: 96 % | WEIGHT: 136 LBS | SYSTOLIC BLOOD PRESSURE: 122 MMHG

## 2025-04-26 LAB
ANION GAP SERPL CALCULATED.3IONS-SCNC: 10 MMOL/L (ref 4–13)
BASOPHILS # BLD AUTO: 0.02 THOUSANDS/ÂΜL (ref 0–0.1)
BASOPHILS NFR BLD AUTO: 0 % (ref 0–1)
BUN SERPL-MCNC: 14 MG/DL (ref 5–25)
CALCIUM SERPL-MCNC: 8.5 MG/DL (ref 8.4–10.2)
CHLORIDE SERPL-SCNC: 102 MMOL/L (ref 96–108)
CO2 SERPL-SCNC: 23 MMOL/L (ref 21–32)
CREAT SERPL-MCNC: 0.86 MG/DL (ref 0.6–1.3)
EOSINOPHIL # BLD AUTO: 0.34 THOUSAND/ÂΜL (ref 0–0.61)
EOSINOPHIL NFR BLD AUTO: 4 % (ref 0–6)
ERYTHROCYTE [DISTWIDTH] IN BLOOD BY AUTOMATED COUNT: 14.1 % (ref 11.6–15.1)
GFR SERPL CREATININE-BSD FRML MDRD: 68 ML/MIN/1.73SQ M
GLUCOSE SERPL-MCNC: 143 MG/DL (ref 65–140)
HCT VFR BLD AUTO: 33.4 % (ref 34.8–46.1)
HGB BLD-MCNC: 10.9 G/DL (ref 11.5–15.4)
IMM GRANULOCYTES # BLD AUTO: 0.04 THOUSAND/UL (ref 0–0.2)
IMM GRANULOCYTES NFR BLD AUTO: 1 % (ref 0–2)
LYMPHOCYTES # BLD AUTO: 2.07 THOUSANDS/ÂΜL (ref 0.6–4.47)
LYMPHOCYTES NFR BLD AUTO: 24 % (ref 14–44)
MCH RBC QN AUTO: 30.1 PG (ref 26.8–34.3)
MCHC RBC AUTO-ENTMCNC: 32.6 G/DL (ref 31.4–37.4)
MCV RBC AUTO: 92 FL (ref 82–98)
MONOCYTES # BLD AUTO: 0.67 THOUSAND/ÂΜL (ref 0.17–1.22)
MONOCYTES NFR BLD AUTO: 8 % (ref 4–12)
NEUTROPHILS # BLD AUTO: 5.64 THOUSANDS/ÂΜL (ref 1.85–7.62)
NEUTS SEG NFR BLD AUTO: 63 % (ref 43–75)
NRBC BLD AUTO-RTO: 0 /100 WBCS
PLATELET # BLD AUTO: 106 THOUSANDS/UL (ref 149–390)
PMV BLD AUTO: 11.3 FL (ref 8.9–12.7)
POTASSIUM SERPL-SCNC: 4.9 MMOL/L (ref 3.5–5.3)
RBC # BLD AUTO: 3.62 MILLION/UL (ref 3.81–5.12)
SODIUM SERPL-SCNC: 135 MMOL/L (ref 135–147)
WBC # BLD AUTO: 8.78 THOUSAND/UL (ref 4.31–10.16)

## 2025-04-26 PROCEDURE — 99024 POSTOP FOLLOW-UP VISIT: CPT | Performed by: PHYSICIAN ASSISTANT

## 2025-04-26 PROCEDURE — 99024 POSTOP FOLLOW-UP VISIT: CPT | Performed by: THORACIC SURGERY (CARDIOTHORACIC VASCULAR SURGERY)

## 2025-04-26 PROCEDURE — 85025 COMPLETE CBC W/AUTO DIFF WBC: CPT

## 2025-04-26 PROCEDURE — 80048 BASIC METABOLIC PNL TOTAL CA: CPT

## 2025-04-26 RX ADMIN — LEVOTHYROXINE SODIUM 100 MCG: 100 TABLET ORAL at 05:09

## 2025-04-26 RX ADMIN — OXYCODONE HYDROCHLORIDE 5 MG: 5 TABLET ORAL at 10:36

## 2025-04-26 RX ADMIN — GABAPENTIN 100 MG: 100 CAPSULE ORAL at 08:37

## 2025-04-26 RX ADMIN — AMLODIPINE BESYLATE 2.5 MG: 2.5 TABLET ORAL at 08:37

## 2025-04-26 RX ADMIN — ALLOPURINOL 100 MG: 100 TABLET ORAL at 08:33

## 2025-04-26 RX ADMIN — SENNOSIDES 8.6 MG: 8.6 TABLET, FILM COATED ORAL at 08:33

## 2025-04-26 RX ADMIN — ACETAMINOPHEN 975 MG: 325 TABLET, FILM COATED ORAL at 05:09

## 2025-04-26 RX ADMIN — DOCUSATE SODIUM 100 MG: 100 CAPSULE, LIQUID FILLED ORAL at 08:33

## 2025-04-26 RX ADMIN — ATENOLOL 50 MG: 50 TABLET ORAL at 08:37

## 2025-04-26 RX ADMIN — OXYCODONE HYDROCHLORIDE 5 MG: 5 TABLET ORAL at 06:12

## 2025-04-26 NOTE — ASSESSMENT & PLAN NOTE
70year old female status post 4/24 VATS right upper lobectomy with mediastinal lymph node dissection    CT removed yesterday, CXR with trace right apical pneumothorax    Febrile overnight to 100.6, this morning febrile 100.6-101.4     x1    WBC 8.78 from 7.5    Plan:   - Regular diet  - Keep x 24 hours to monitor fever curve   - Pain and nausea control PRN  - Encourage IS, ambulation   - PT/OT - no needs  - Appreciate CM for dispo planning   - DVT ppx

## 2025-04-26 NOTE — PROGRESS NOTES
"Progress Note - Thoracic    Name: Ashley Phillips 70 y.o. 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 No acute events overnight. Patient's pain is well controlled. She denies nausea or vomiting. No chest pain or SOB. Pulling 1L on IS. Did not feel feverish yesterday but felt sweaty at some points. Has been OOB and ambulating. Denies productive cough or urinary symptoms    Objective:   Vitals:Blood pressure 108/54, pulse 73, temperature 100.2 °F (37.9 °C), resp. rate 16, height 5' 3\" (1.6 m), weight 61.7 kg (136 lb), SpO2 96%.  Temp (24hrs), Av °F (37.8 °C), Min:98.4 °F (36.9 °C), Max:101.4 °F (38.6 °C)        Intake/Output Summary (Last 24 hours) at 2025 0715  Last data filed at 2025 1701  Gross per 24 hour   Intake 600 ml   Output 950 ml   Net -350 ml       Invasive Devices       Peripheral Intravenous Line  Duration             Peripheral IV 25 Dorsal (posterior);Left Hand 1 day    Peripheral IV 25 Left Antecubital 1 day                    Physical Exam:  General: No acute distress, alert and oriented  CV: Well perfused, regular rate and rhythm  Lungs: Normal work of breathing, no increased respiratory effort on RA  Chest: incisions cdi, no erythema  Abdomen: Soft, " non-tender, non-distended.   Extremities: No edema, clubbing or cyanosis  Skin: Warm, dry    Lab Results: BMP/CMP:   Lab Results   Component Value Date    SODIUM 135 04/26/2025    K 4.9 04/26/2025     04/26/2025    CO2 23 04/26/2025    BUN 14 04/26/2025    CREATININE 0.86 04/26/2025    CALCIUM 8.5 04/26/2025    EGFR 68 04/26/2025    and CBC:   Lab Results   Component Value Date    WBC 8.78 04/26/2025    HGB 10.9 (L) 04/26/2025    HCT 33.4 (L) 04/26/2025    MCV 92 04/26/2025     (L) 04/26/2025    RBC 3.62 (L) 04/26/2025    MCH 30.1 04/26/2025    MCHC 32.6 04/26/2025    RDW 14.1 04/26/2025    MPV 11.3 04/26/2025    NRBC 0 04/26/2025     VTE Prophylaxis: Sequential compression device (Venodyne)  and Enoxaparin (Lovenox)    Irlanda Montalvo MD  4/26/2025

## 2025-04-28 ENCOUNTER — TRANSITIONAL CARE MANAGEMENT (OUTPATIENT)
Dept: FAMILY MEDICINE CLINIC | Facility: CLINIC | Age: 70
End: 2025-04-28

## 2025-04-28 ENCOUNTER — RA CDI HCC (OUTPATIENT)
Dept: OTHER | Facility: HOSPITAL | Age: 70
End: 2025-04-28

## 2025-04-28 ENCOUNTER — TELEPHONE (OUTPATIENT)
Dept: FAMILY MEDICINE CLINIC | Facility: CLINIC | Age: 70
End: 2025-04-28

## 2025-04-28 NOTE — DISCHARGE SUMMARY
Discharge Summary - Thoracic    Name: Ashley Phillips 70 y.o. female I MRN: [id number]  [facility] I Date of Admission: 4/24/2025   Date of Service: 4/28/2025 I Hospital Day: 2    Admission Date:   Admission Orders (From admission, onward)       Ordered        04/24/25 1441  Inpatient Admission  Once                           Discharge Date: 4/26/2025     Admitting Diagnosis: NSCLC of right lung (HCC) [C34.91]  Discharge Diagnosis: NSCLC of right lung    Medical Problems       Resolved Problems  Date Reviewed: 2/21/2025   None         Attending: Dr. Dominique    Consulting Physician(s): None    Procedures Performed: Right thoracoscopic right upper lobectomy with mediastinal lymph node dissection, bronchoscopy flexible      Pathology: Pending    Hospital Course: Patient presented on 4/24/2025 for planned right VATS upper lobectomy with mediastinal lymph node dissection. Her surgery went as planned without complications. Her chest tube was removed on post-operative day one.  Post-pull CXR showed trace right apical pneumothorax s/p chest tube removal. She was evaluated by physical therapy and occupational therapy during her admission who recommend no post-acute rehabilitation needs. Patient was deemed stable for discharge on 4/26. She was discharged on post-operative ERAS pain medications with a post-operative appt scheduled.       Condition at Discharge: good     Discharge instructions/Information to patient and family:   See after visit summary for information provided to patient and family.      Provisions for Follow-Up Care:  See after visit summary for information related to follow-up care and any pertinent home health orders.      Disposition: Home          Planned Readmission: No    Discharge Statement:  I have spent a total time of 15 minutes in caring for this patient on the day of the visit/encounter.     Discharge Medications:  See after visit summary for reconciled discharge medications provided  to patient and family.

## 2025-04-29 PROCEDURE — 88305 TISSUE EXAM BY PATHOLOGIST: CPT | Performed by: PATHOLOGY

## 2025-04-29 PROCEDURE — 88309 TISSUE EXAM BY PATHOLOGIST: CPT | Performed by: PATHOLOGY

## 2025-04-29 PROCEDURE — 88313 SPECIAL STAINS GROUP 2: CPT | Performed by: PATHOLOGY

## 2025-05-01 ENCOUNTER — HOSPITAL ENCOUNTER (OUTPATIENT)
Dept: RADIOLOGY | Facility: HOSPITAL | Age: 70
Discharge: HOME | End: 2025-05-01
Payer: MEDICARE

## 2025-05-01 DIAGNOSIS — C34.91 NSCLC OF RIGHT LUNG (HCC): ICD-10-CM

## 2025-05-01 PROCEDURE — 71046 X-RAY EXAM CHEST 2 VIEWS: CPT

## 2025-05-02 ENCOUNTER — APPOINTMENT (OUTPATIENT)
Age: 70
End: 2025-05-02
Payer: MEDICARE

## 2025-05-02 ENCOUNTER — OFFICE VISIT (OUTPATIENT)
Dept: FAMILY MEDICINE CLINIC | Facility: CLINIC | Age: 70
End: 2025-05-02
Payer: MEDICARE

## 2025-05-02 VITALS
HEART RATE: 54 BPM | BODY MASS INDEX: 24.8 KG/M2 | HEIGHT: 63 IN | WEIGHT: 140 LBS | SYSTOLIC BLOOD PRESSURE: 142 MMHG | DIASTOLIC BLOOD PRESSURE: 74 MMHG | OXYGEN SATURATION: 99 %

## 2025-05-02 DIAGNOSIS — M16.11 PRIMARY OSTEOARTHRITIS OF RIGHT HIP: ICD-10-CM

## 2025-05-02 DIAGNOSIS — Z90.2 S/P LOBECTOMY OF LUNG: ICD-10-CM

## 2025-05-02 DIAGNOSIS — C34.91 NSCLC OF RIGHT LUNG (HCC): ICD-10-CM

## 2025-05-02 DIAGNOSIS — D69.3 AUTOIMMUNE THROMBOCYTOPENIA (HCC): ICD-10-CM

## 2025-05-02 DIAGNOSIS — C34.11 PRIMARY ADENOCARCINOMA OF UPPER LOBE OF RIGHT LUNG (HCC): Primary | ICD-10-CM

## 2025-05-02 DIAGNOSIS — E03.9 ACQUIRED HYPOTHYROIDISM: ICD-10-CM

## 2025-05-02 DIAGNOSIS — I10 ESSENTIAL HYPERTENSION: ICD-10-CM

## 2025-05-02 DIAGNOSIS — Z85.850 HISTORY OF THYROID CANCER: ICD-10-CM

## 2025-05-02 DIAGNOSIS — R17 ELEVATED BILIRUBIN: ICD-10-CM

## 2025-05-02 PROBLEM — K80.50 BILIARY COLIC: Status: RESOLVED | Noted: 2025-02-26 | Resolved: 2025-05-02

## 2025-05-02 LAB
ALBUMIN SERPL BCG-MCNC: 4 G/DL (ref 3.5–5)
ALP SERPL-CCNC: 102 U/L (ref 34–104)
ALT SERPL W P-5'-P-CCNC: 22 U/L (ref 7–52)
ANION GAP SERPL CALCULATED.3IONS-SCNC: 11 MMOL/L (ref 4–13)
AST SERPL W P-5'-P-CCNC: 24 U/L (ref 13–39)
BASOPHILS # BLD AUTO: 0.07 THOUSANDS/ÂΜL (ref 0–0.1)
BASOPHILS NFR BLD AUTO: 1 % (ref 0–1)
BILIRUB SERPL-MCNC: 0.83 MG/DL (ref 0.2–1)
BUN SERPL-MCNC: 10 MG/DL (ref 5–25)
CALCIUM SERPL-MCNC: 9.6 MG/DL (ref 8.4–10.2)
CHLORIDE SERPL-SCNC: 103 MMOL/L (ref 96–108)
CO2 SERPL-SCNC: 28 MMOL/L (ref 21–32)
CREAT SERPL-MCNC: 0.82 MG/DL (ref 0.6–1.3)
EOSINOPHIL # BLD AUTO: 0.52 THOUSAND/ÂΜL (ref 0–0.61)
EOSINOPHIL NFR BLD AUTO: 7 % (ref 0–6)
ERYTHROCYTE [DISTWIDTH] IN BLOOD BY AUTOMATED COUNT: 13.7 % (ref 11.6–15.1)
GFR SERPL CREATININE-BSD FRML MDRD: 72 ML/MIN/1.73SQ M
GLUCOSE SERPL-MCNC: 103 MG/DL (ref 65–140)
HCT VFR BLD AUTO: 33.5 % (ref 34.8–46.1)
HGB BLD-MCNC: 11 G/DL (ref 11.5–15.4)
IMM GRANULOCYTES # BLD AUTO: 0.07 THOUSAND/UL (ref 0–0.2)
IMM GRANULOCYTES NFR BLD AUTO: 1 % (ref 0–2)
LYMPHOCYTES # BLD AUTO: 2.01 THOUSANDS/ÂΜL (ref 0.6–4.47)
LYMPHOCYTES NFR BLD AUTO: 27 % (ref 14–44)
MCH RBC QN AUTO: 31.4 PG (ref 26.8–34.3)
MCHC RBC AUTO-ENTMCNC: 32.8 G/DL (ref 31.4–37.4)
MCV RBC AUTO: 96 FL (ref 82–98)
MONOCYTES # BLD AUTO: 0.48 THOUSAND/ÂΜL (ref 0.17–1.22)
MONOCYTES NFR BLD AUTO: 7 % (ref 4–12)
NEUTROPHILS # BLD AUTO: 4.27 THOUSANDS/ÂΜL (ref 1.85–7.62)
NEUTS SEG NFR BLD AUTO: 57 % (ref 43–75)
NRBC BLD AUTO-RTO: 0 /100 WBCS
PLATELET # BLD AUTO: 162 THOUSANDS/UL (ref 149–390)
PMV BLD AUTO: 12.2 FL (ref 8.9–12.7)
POTASSIUM SERPL-SCNC: 4.3 MMOL/L (ref 3.5–5.3)
PROT SERPL-MCNC: 7.1 G/DL (ref 6.4–8.4)
RBC # BLD AUTO: 3.5 MILLION/UL (ref 3.81–5.12)
SODIUM SERPL-SCNC: 142 MMOL/L (ref 135–147)
WBC # BLD AUTO: 7.42 THOUSAND/UL (ref 4.31–10.16)

## 2025-05-02 PROCEDURE — 85025 COMPLETE CBC W/AUTO DIFF WBC: CPT

## 2025-05-02 PROCEDURE — 99496 TRANSJ CARE MGMT HIGH F2F 7D: CPT | Performed by: INTERNAL MEDICINE

## 2025-05-02 PROCEDURE — 80053 COMPREHEN METABOLIC PANEL: CPT

## 2025-05-02 PROCEDURE — 36415 COLL VENOUS BLD VENIPUNCTURE: CPT

## 2025-05-02 RX ORDER — GABAPENTIN 100 MG/1
100 CAPSULE ORAL 3 TIMES DAILY
Qty: 90 CAPSULE | Refills: 1 | Status: SHIPPED | OUTPATIENT
Start: 2025-05-02 | End: 2025-07-31

## 2025-05-02 NOTE — ASSESSMENT & PLAN NOTE
Recheck bilirubin.  Has not been checked to normalization.  Extensive workup with MRCP, attempted ERCP and nuclear medicine scan noted.  She is getting through this surgery, may need to revisit this.  Will see her again in 2 months.

## 2025-05-02 NOTE — ASSESSMENT & PLAN NOTE
Status post lobectomy and pain continues, we will decrease her gabapentin during the day due to sedation, may take up to 3 pills at bedtime.  New prescription sent in and she may taper these as her pain improves.  Orders:  •  gabapentin (Neurontin) 100 mg capsule; Take 1 capsule (100 mg total) by mouth 3 (three) times a day

## 2025-05-02 NOTE — ASSESSMENT & PLAN NOTE
Was going through evaluation for her hip replacement, lung lesion found.  Hip replacement currently on hold.

## 2025-05-02 NOTE — ASSESSMENT & PLAN NOTE
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

## 2025-05-02 NOTE — PROGRESS NOTES
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  Definitive surgery resulting in right upper lobe lobectomy.  Sutures and scars appear well-healed.  Continues with pain but doing reasonably well.       NSCLC of right lung (HCC)  Status post lobectomy and pain continues, we will decrease her gabapentin during the day due to sedation, may take up to 3 pills at bedtime.  New prescription sent in and she may taper these as her pain improves.  Orders:  •  gabapentin (Neurontin) 100 mg capsule; Take 1 capsule (100 mg total) by mouth 3 (three) times a day    S/P lobectomy of lung  Hospitalization reviewed.       Essential hypertension  Patient blood pressure appears well-controlled.  Continue current medical therapy.  Continue to monitor home blood pressures.   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       Autoimmune thrombocytopenia (HCC)  Continue to monitor platelet count.       Elevated bilirubin  Recheck bilirubin.  Has not been checked to normalization.  Extensive workup with MRCP, attempted ERCP and nuclear medicine scan noted.  She is getting through this surgery, may need to revisit this.  Will see her again in 2 months.       History of thyroid cancer  Status post resection.       Primary osteoarthritis of right hip  Was going through evaluation for her hip replacement, lung lesion found.  Hip replacement currently on hold.            History of Present Illness     Transitional Care Management Review:   Ashley Phillips is a 70 y.o. female here for TCM follow  "up.     During the TCM phone call patient stated:  TCM Call (since 4/18/2025)     Date and time call was made  4/28/2025  2:12 PM    Patient was hospitialized at  [facility]    Date of Admission  04/24/25    Date of discharge  04/26/25    Diagnosis  Adencarcinoma of right lung    Disposition  Home      TCM Call (since 4/18/2025)     Scheduled for follow up?  Yes    I have advised the patient to call PCP with any new or worsening symptoms  Olivia Morrison    Living Arrangements  Family members        Patient is here for transition of care appointment.  Reviewed all relevant data from the hospitalization including consultations, lab tests and x-rays.  He reviewed all his current medications and reconcile them.    Patient status post right upper lobe lobectomy for adenocarcinoma of the lung.  Biopsies revealed all lymph nodes free of metastatic disease.  Surgical scars slowly healing, has follow-up for suture removal.  Remains positive overall.  Will be part of the tumor board conference in the near future for appropriate postoperative care.      Review of Systems   Constitutional:  Negative for chills and fever.   HENT:  Negative for rhinorrhea and sore throat.    Eyes:  Negative for visual disturbance.   Respiratory:  Negative for cough and shortness of breath.    Cardiovascular:  Negative for chest pain and leg swelling.   Gastrointestinal:  Negative for abdominal pain, diarrhea, nausea and vomiting.   Genitourinary:  Negative for dysuria.   Musculoskeletal:  Positive for arthralgias, back pain and gait problem. Negative for myalgias.        Right hip pain, right chest wall pain from recent surgery   Skin:  Negative for rash.   Neurological:  Negative for dizziness and headaches.   Psychiatric/Behavioral:  Negative for confusion.    All other systems reviewed and are negative.    Objective   /74 (BP Location: Left arm, Patient Position: Sitting, Cuff Size: Adult)   Pulse (!) 54   Ht 5' 3\" (1.6 m)   Wt " 63.5 kg (140 lb)   SpO2 99%   BMI 24.80 kg/m²     Physical Exam  Vitals and nursing note reviewed.   Constitutional:       General: She is not in acute distress.     Appearance: She is well-developed.   HENT:      Head: Normocephalic and atraumatic.   Eyes:      Conjunctiva/sclera: Conjunctivae normal.   Cardiovascular:      Rate and Rhythm: Normal rate and regular rhythm.      Pulses: Normal pulses.      Heart sounds: Normal heart sounds. No murmur heard.  Pulmonary:      Effort: Pulmonary effort is normal. No respiratory distress.      Breath sounds: Normal breath sounds.   Abdominal:      Palpations: Abdomen is soft.      Tenderness: There is no abdominal tenderness.   Musculoskeletal:         General: No swelling.      Cervical back: Normal range of motion and neck supple.   Skin:     General: Skin is warm and dry.      Capillary Refill: Capillary refill takes less than 2 seconds.      Comments: Surgical wounds healing well, remains tender.   Neurological:      General: No focal deficit present.      Mental Status: She is alert.   Psychiatric:         Mood and Affect: Mood normal.       Medications have been reviewed by provider in current encounter

## 2025-05-02 NOTE — ASSESSMENT & PLAN NOTE
Found on preoperative clearing for hip.  Definitive surgery resulting in right upper lobe lobectomy.  Sutures and scars appear well-healed.  Continues with pain but doing reasonably well.

## 2025-05-03 ENCOUNTER — RESULTS FOLLOW-UP (OUTPATIENT)
Dept: EMERGENCY DEPT | Facility: HOSPITAL | Age: 70
End: 2025-05-03

## 2025-05-06 ENCOUNTER — OFFICE VISIT (OUTPATIENT)
Dept: CARDIAC SURGERY | Facility: CLINIC | Age: 70
End: 2025-05-06

## 2025-05-06 VITALS
HEIGHT: 63 IN | SYSTOLIC BLOOD PRESSURE: 134 MMHG | BODY MASS INDEX: 24.92 KG/M2 | OXYGEN SATURATION: 96 % | DIASTOLIC BLOOD PRESSURE: 72 MMHG | TEMPERATURE: 97.8 F | RESPIRATION RATE: 16 BRPM | WEIGHT: 140.65 LBS | HEART RATE: 72 BPM

## 2025-05-06 DIAGNOSIS — C34.11 PRIMARY ADENOCARCINOMA OF UPPER LOBE OF RIGHT LUNG (HCC): Primary | ICD-10-CM

## 2025-05-06 PROCEDURE — 99024 POSTOP FOLLOW-UP VISIT: CPT | Performed by: THORACIC SURGERY (CARDIOTHORACIC VASCULAR SURGERY)

## 2025-05-06 NOTE — PROGRESS NOTES
Name: Ashley Phillips      : 1955      MRN: [id number]  Encounter Provider: Myles Dominique MD  Encounter Date: 2025   Encounter department: 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 Phillips is doing well after right thoracoscopic upper lobectomy.  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  She will not need any adjuvant therapy.  We will see her back in 4 weeks for final postsurgical check.  Then we will begin routine lung cancer surveillance with a CT scan of the chest every 6 months for 2 years.           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)     2025 -  Cancer Staged    Staging form: Lung, AJCC V9  - Clinical stage from 2025: Stage IA3 (cT1c, cN0, cM0) - Signed by Myles Dominique MD on 4/15/2025  Histopathologic type: Adenocarcinoma, NOS  Stage prefix: Initial diagnosis  Method of lymph node assessment: Other       2025 -  Cancer Staged    Staging form: Lung, AJCC V9  - Pathologic stage from 2025: Stage IB (pT2a, pN0, cM0) - Signed by Myles Dominique MD on 2025  Histopathologic type: Adenocarcinoma of lung, mucinous  Stage prefix: Initial diagnosis  Histologic grade (G): G3  Histologic grading system: 4 grade system  Residual tumor (R): R0 - None  Laterality: Right  Tumor size (mm): 35  Lymph-vascular invasion (LVI): LVI not present (absent)/not identified            History of Present Illness   HPI  Ashley Phillips is a 70 y.o. female who returns to the office for routine postsurgical care.  She is now 2 weeks out from a right thoracoscopic upper lobectomy and mediastinal lymph node dissection for a stage Ib adenocarcinoma.  She had an R0 resection.  Overall, she has been  "doing very well.  She is not taking any narcotic pain medications.  She has been walking daily.  She denies fever, chills, cough, purulent sputum production, or hemoptysis.    She underwent a PA and lateral chest x-ray on May 1, 2025 and this is personally reviewed.  There is anticipated volume loss on the right with mild tenting of the right hemidiaphragm.  There is no consolidation or pneumothorax.  There is a tiny left pleural effusion.    Review of Systems        Objective   /72 (Patient Position: Sitting, Cuff Size: Standard)   Pulse 72   Temp 97.8 °F (36.6 °C) (Temporal)   Resp 16   Ht 5' 3\" (1.6 m)   Wt 63.8 kg (140 lb 10.5 oz)   SpO2 96%   BMI 24.92 kg/m²     Pain Screening:     ECOG ECOG Performance Status: 0 - Fully active, able to carry on all pre-disease performance without restriction  Physical Exam  Constitutional:       General: She is not in acute distress.     Appearance: Normal appearance.   Cardiovascular:      Rate and Rhythm: Normal rate and regular rhythm.   Pulmonary:      Effort: Pulmonary effort is normal.      Breath sounds: Normal breath sounds.      Comments: Incision clean dry and intact  Chest tube stitch removed  Neurological:      Mental Status: She is alert.         Labs:     Radiology Results Review : I have reviewed images/report studies in PACS as described above (in the HPI).  Pathology: I have reviewed pathology reports described above.      "

## 2025-05-06 NOTE — ASSESSMENT & PLAN NOTE
Ms. Phillips is doing well after right thoracoscopic upper lobectomy.  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  She will not need any adjuvant therapy.  We will see her back in 4 weeks for final postsurgical check.  Then we will begin routine lung cancer surveillance with a CT scan of the chest every 6 months for 2 years.

## 2025-05-07 ENCOUNTER — TELEPHONE (OUTPATIENT)
Age: 70
End: 2025-05-07

## 2025-05-07 NOTE — TELEPHONE ENCOUNTER
Patient called,    MARINA for Dr. Hernandez,    Patient stated, she had surgery upper lobe right lung, no follow up or chemo or radiation needed.  Patient wanted to Thank Dr. Hernandez and to give him the news.    Please advise, Dr. Hernandez.  Thank you.

## 2025-05-12 ENCOUNTER — TELEPHONE (OUTPATIENT)
Age: 70
End: 2025-05-12

## 2025-05-12 NOTE — TELEPHONE ENCOUNTER
I called patient and spoke with her. Appt now rescheduled to 6/23/25 @ 9am here in our [facility] office on our PA schedule. All questions answered at this time. All appt details reviewed. She is understanding and agreement with new appt date and time.

## 2025-05-12 NOTE — TELEPHONE ENCOUNTER
Please call pt back to fix appt.  She was supposed to be seen in 2 wks for a post op but it was scheduled for June 2026.  I checked the  but the doctor didn't have anything available to late June.  Please call her back with a sooner appt for her 2nd post op.

## 2025-06-03 ENCOUNTER — PATIENT OUTREACH (OUTPATIENT)
Dept: HEMATOLOGY ONCOLOGY | Facility: CLINIC | Age: 70
End: 2025-06-03

## 2025-06-03 NOTE — PROGRESS NOTES
Ashley was cleared from further treatments, she no longer need any navigation after her successful surgery. Message left on her voicemail. I will remain available to her should she need anything in the future.

## 2025-06-16 ENCOUNTER — TELEPHONE (OUTPATIENT)
Age: 70
End: 2025-06-16

## 2025-06-16 NOTE — TELEPHONE ENCOUNTER
"How does the incision look? WNL    Do you have fever or chills? No    Are you having any pain? No    Do you have a drain(s)? No    Verify post-op appointment date and time  [x] 6/23    Do you have any other questions or concerns? Soft tissue on the right side where the instrumentation was introduced for the sx that seems to hang when she bends over, she noticed this morning. \"Pocket of blubber\" She denies and bleeding or issues at the incision site and is not experiencing any pain. Patient's  seems to think there is fluid in it, patient isn't sure if she believes there is, reporting that it feels soft.     Patient inquiring if she should be concerned regarding the \"pocket\" or if she is ok to be assessed at her upcoming appt on 6/23. I confirmed with her we would discuss with provider and return her call.        " no

## 2025-06-16 NOTE — TELEPHONE ENCOUNTER
Spoke with Ashley to discuss symptoms further. Patient denies fever/chills/shortness of breath.  Patient notes slight bulge when standing straight which appears to be larger when bending over right under breast where incision was from VATS surgery 04/24/25. Advised patient that this can be abdominal wall weakness/paralysis or a pseudohernia due to intercostal nerves being cut/manipulated during surgery. Patient has follow up appointment with PA's on 06/23/25 where they can assess and discuss symptoms further.  Patient acknowledged understanding and was appreciative of follow up call.

## 2025-06-23 ENCOUNTER — OFFICE VISIT (OUTPATIENT)
Dept: CARDIAC SURGERY | Facility: CLINIC | Age: 70
End: 2025-06-23

## 2025-06-23 VITALS
BODY MASS INDEX: 25.04 KG/M2 | RESPIRATION RATE: 15 BRPM | HEART RATE: 58 BPM | OXYGEN SATURATION: 97 % | WEIGHT: 141.31 LBS | HEIGHT: 63 IN | TEMPERATURE: 97.5 F | DIASTOLIC BLOOD PRESSURE: 81 MMHG | SYSTOLIC BLOOD PRESSURE: 160 MMHG

## 2025-06-23 DIAGNOSIS — C34.11 PRIMARY ADENOCARCINOMA OF UPPER LOBE OF RIGHT LUNG (HCC): Primary | ICD-10-CM

## 2025-06-23 PROCEDURE — 99024 POSTOP FOLLOW-UP VISIT: CPT

## 2025-06-23 NOTE — PROGRESS NOTES
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 Phillips approximately 2 months out from right upper lobectomy in the setting of stage IB micropapillary adenocarcinoma.  She has recovered well since the time of the operation. She continues to increase her level of activity, and I have encouraged her to do so further with the understanding to take breaks as appropriate and to be mindful of her level activity outside on particularly humid or high temperature days.  Patient denies chest wall pain.  She does have a small area of uniform swelling/bulge directly under right chest wall access incision.  This area is nontender and is not fluctuant.  We discussed that this may represent a lung herniation for which patient does not require any treatment.  Advised patient to monitor herself for any pain at that site, firmness to the area, or obstructed sensation.  Advised patient that this could still represent some swelling and using a compressive bra or ice to the area may also assist.  We discussed patient's surgical pathology and that she does not need any further treatment beyond surgical resection.  Discussed that patient will now enter into lung cancer surveillance which will consist of a CT scan of the chest with office appointments to follow which will occur every 6 months for the first 2 years from time of surgery followed by annual surveillance.  First surveillance CT scan of the chest we plan for 6 months from time of surgery which has been ordered today and we will arrange for appointment to follow.  Patient knows to call the office sooner with any questions or concerns.  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)     4/8/2025 -  Cancer Staged    Staging form: Lung, AJCC V9  - Clinical stage from 4/8/2025: Stage IA3 (cT1c, cN0, cM0) - Signed by Myles Dominique MD on 4/15/2025  Histopathologic type: Adenocarcinoma, NOS  Stage prefix: Initial diagnosis  Method of lymph node assessment: Other       4/24/2025 -  Cancer Staged    Staging form: Lung, AJCC V9  - Pathologic stage from 4/24/2025: Stage IB (pT2a, pN0, cM0) - Signed by Myles Dominique MD on 5/6/2025  Histopathologic type: Adenocarcinoma of lung, mucinous  Stage prefix: Initial diagnosis  Histologic grade (G): G3  Histologic grading system: 4 grade system  Residual tumor (R): R0 - None  Laterality: Right  Tumor size (mm): 35  Lymph-vascular invasion (LVI): LVI not present (absent)/not identified            History of Present Illness     HPI    Patient had no 30-day readmission from the time of hospital discharge.     Ashley Phillips is a 70 y.o. female who presents today for post operative follow up. Ms. Phillips is approximately 8.5 weeks from right upper lobectomy on 4/24/2025 for stage IB adenocarcinoma. At last office visit on 5/6/2025, patient was recovering well and her pathology of a stage Ib micropapillary adenocarcinoma was reviewed.  She was instructed to return in approximately 4 weeks for continued postoperative care.    On discussion today, patient reports she is feeling well overall. States she can now lay on right side. Notices some shortness of breath with the humidity but continues to push herself as she watches her grandchildren and likes to go on walks. Occasional dry cough in mornings.  Denies chest pain, palpitations, fevers, chills.  Patient reports she was previously being worked up for hip replacement that she may pursue sometime in the next year and she is cleared from our standpoint to do  so.  Patient's pain has been well-controlled.  She reports she takes Tylenol on occasion which primarily is for her hip.  Also has taken some gabapentin at night on an as-needed basis which also helps with her hip.    Rechecked patient's blood pressure which was 162/82 on my personal recheck.  Patient has been did express some rushing to the office as encountered a fair amount of traffic.  Remained elevated upon my re-check.  Patient has upcoming appointment with PCP and has a blood pressure cuff at home.  I have advised her to take her blood pressure at home over the course the next few weeks before she sees her PCP at take a log to the office for that appointment.     Review of Systems   Medical History Reviewed by provider this encounter:  Tobacco  Allergies  Meds  Problems  Med Hx  Surg Hx  Fam Hx     .  Past Medical History   Past Medical History[1]  Past Surgical History[2]  Family History[3]   reports that she quit smoking about 43 years ago. Her smoking use included cigarettes. She has been exposed to tobacco smoke. She has never used smokeless tobacco. She reports that she does not drink alcohol and does not use drugs.  Current Outpatient Medications   Medication Instructions    allopurinol (ZYLOPRIM) 100 mg, Oral, Daily    amLODIPine (NORVASC) 2.5 mg, Oral, Daily    atenolol (TENORMIN) 50 mg, Oral, 2 times daily    Biotin 1000 MCG CHEW Daily    cyanocobalamin (VITAMIN B-12) 1,000 mcg, Daily    gabapentin (NEURONTIN) 100 mg, Oral, 3 times daily    levothyroxine 100 mcg, Daily    valsartan (DIOVAN) 160 mg, Oral, Daily    Vitamin D3 2,000 Units, Daily   Allergies[4]   Medications Ordered Prior to Encounter[5]   Social History[6]     Objective   There were no vitals taken for this visit.    Pain Screening:     ECOG    Physical Exam  Constitutional:       General: She is not in acute distress.  HENT:      Head: Normocephalic and atraumatic.      Nose: Nose normal.     Eyes:      Extraocular Movements:  Extraocular movements intact.       Cardiovascular:      Rate and Rhythm: Normal rate and regular rhythm.   Pulmonary:      Effort: Pulmonary effort is normal.      Breath sounds: Normal breath sounds.      Comments: Right chest wall incisions are well-healed.  Along patient's access incision on right chest wall she has a uniform approximately 1 cm bulge.  The area is soft, nontender, not fluctuant. There is some increase in bulge with coughing.   Abdominal:      Palpations: Abdomen is soft.      Tenderness: There is no abdominal tenderness.     Musculoskeletal:      Right lower leg: No edema.      Left lower leg: No edema.     Skin:     General: Skin is warm and dry.              [1]   Past Medical History:  Diagnosis Date    Chronic ITP (idiopathic thrombocytopenia) (HCC)     Disease of thyroid gland     Hypertension    [2]   Past Surgical History:  Procedure Laterality Date    CHOLECYSTECTOMY LAPAROSCOPIC N/A 02/26/2025    Procedure: CHOLECYSTECTOMY LAPAROSCOPIC; REPAIR OF SUPRAUMBILICAL HERNIA;  Surgeon: Eduard Salcido DO;  Location: [facility];  Service: General    COLONOSCOPY  10/26/2017    Dr. Guevara - polyp in sigmoid colon removed; moderate diverticulosis in sigmoid colon. Bx: tubular adenoma.    COLONOSCOPY  08/03/2012    Dr. Guevara - polyp found in sigmoid colon removed. Moderately severe diverticulosis in the sigmoid colon. Bx: tubular adenoma.    CYST REMOVAL  2015    Mouth ( Upper) - done by oral surgeon    GANGLION CYST EXCISION Right 1999    Right wrist    HYSTERECTOMY      LUNG LOBECTOMY Right 4/24/2025    Procedure: THORACOSCOPIC RIGHT UPPER LOBE PULMONARY LOBECTOMY WITH MEDIASTINAL LYMPH NODE DISSECTION;  Surgeon: Myles Dominique MD;  Location:  MAIN OR;  Service: Thoracic    ND [facility] INCL FLUOR GDNCE DX W/CELL WASHG SPX N/A 4/24/2025    Procedure: BRONCHOSCOPY FLEXIBLE;  Surgeon: Myles Dominique MD;  [facility];  Service: Thoracic    ND THORACOSCOPY  "W/LOBECTOMY SINGLE LOBE Right 4/24/2025    Procedure: THORACOSCOPY VIDEO ASSISTED SURGERY (VATS);  Surgeon: Myles Dominique MD;  [facility]: [facility];  Service: Thoracic    THYROIDECTOMY, PARTIAL Left 2004    papillary carcinoma of the thyroid    TOTAL THYROIDECTOMY Bilateral 2009    Hashimotos   [3]   Family History  Problem Relation Name Age of Onset    Leukemia Mother          chronic lymphocytic leukemia    Hashimoto's thyroiditis Mother      Diabetes type II Mother      Lung cancer Father      No Known Problems Daughter dario     No Known Problems Maternal Grandmother      No Known Problems Maternal Grandfather      No Known Problems Paternal Grandmother      No Known Problems Paternal Grandfather      No Known Problems Brother      No Known Problems Son      No Known Problems Maternal Aunt      No Known Problems Paternal Aunt      Breast cancer Cousin     [4]   Allergies  Allergen Reactions    Ciprofloxacin Lip Swelling    Erythromycin Other (See Comments)     \"jaundice\"    Penicillins Swelling     Tolerates Ancef.   [5]   Current Outpatient Medications on File Prior to Visit   Medication Sig Dispense Refill    allopurinol (ZYLOPRIM) 100 mg tablet TAKE 1 TABLET BY MOUTH EVERY DAY 90 tablet 1    amLODIPine (NORVASC) 2.5 mg tablet Take 1 tablet (2.5 mg total) by mouth daily 100 tablet 3    atenolol (TENORMIN) 50 mg tablet TAKE 1 TABLET BY MOUTH TWICE A  tablet 1    Biotin 1000 MCG CHEW Chew in the morning.      Cholecalciferol (Vitamin D3) 50 MCG (2000 UT) TABS Take 2,000 Units by mouth in the morning.      cyanocobalamin (VITAMIN B-12) 1000 MCG tablet Take 1,000 mcg by mouth in the morning.      gabapentin (Neurontin) 100 mg capsule Take 1 capsule (100 mg total) by mouth 3 (three) times a day 90 capsule 1    levothyroxine 100 mcg tablet Take 100 mcg by mouth in the morning.      valsartan (DIOVAN) 160 mg tablet Take 1 tablet (160 mg total) by mouth daily 100 tablet 3     No current " facility-administered medications on file prior to visit.   [6]   Social History  Tobacco Use    Smoking status: Former     Current packs/day: 0.00     Types: Cigarettes     Quit date:      Years since quittin.5     Passive exposure: Past    Smokeless tobacco: Never   Vaping Use    Vaping status: Never Used   Substance and Sexual Activity    Alcohol use: Never    Drug use: Never

## 2025-06-23 NOTE — ASSESSMENT & PLAN NOTE
Ms. Phillips approximately 2 months out from right upper lobectomy in the setting of stage IB micropapillary adenocarcinoma.  She has recovered well since the time of the operation. She continues to increase her level of activity, and I have encouraged her to do so further with the understanding to take breaks as appropriate and to be mindful of her level activity outside on particularly humid or high temperature days.  Patient denies chest wall pain.  She does have a small area of uniform swelling/bulge directly under right chest wall access incision.  This area is nontender and is not fluctuant.  We discussed that this may represent a lung herniation for which patient does not require any treatment.  Advised patient to monitor herself for any pain at that site, firmness to the area, or obstructed sensation.  Advised patient that this could still represent some swelling and using a compressive bra or ice to the area may also assist.  We discussed patient's surgical pathology and that she does not need any further treatment beyond surgical resection.  Discussed that patient will now enter into lung cancer surveillance which will consist of a CT scan of the chest with office appointments to follow which will occur every 6 months for the first 2 years from time of surgery followed by annual surveillance.  First surveillance CT scan of the chest we plan for 6 months from time of surgery which has been ordered today and we will arrange for appointment to follow.  Patient knows to call the office sooner with any questions or concerns.  Orders:    CT chest wo contrast; Future

## 2025-06-29 ENCOUNTER — OFFICE VISIT (OUTPATIENT)
Dept: URGENT CARE | Facility: MEDICAL CENTER | Age: 70
End: 2025-06-29
Payer: MEDICARE

## 2025-06-29 VITALS
SYSTOLIC BLOOD PRESSURE: 128 MMHG | HEART RATE: 64 BPM | WEIGHT: 136 LBS | OXYGEN SATURATION: 99 % | DIASTOLIC BLOOD PRESSURE: 78 MMHG | BODY MASS INDEX: 24.09 KG/M2 | TEMPERATURE: 98.3 F | RESPIRATION RATE: 20 BRPM

## 2025-06-29 DIAGNOSIS — R19.7 DIARRHEA, UNSPECIFIED TYPE: Primary | ICD-10-CM

## 2025-06-29 PROCEDURE — 99213 OFFICE O/P EST LOW 20 MIN: CPT | Performed by: PHYSICIAN ASSISTANT

## 2025-06-29 PROCEDURE — G0463 HOSPITAL OUTPT CLINIC VISIT: HCPCS | Performed by: PHYSICIAN ASSISTANT

## 2025-06-29 NOTE — PROGRESS NOTES
[facility]        NAME: Ashley Phillips is a 70 y.o. female  : 1955    MRN: [id number]  DATE: 2025  TIME: 12:06 PM    Assessment and Plan   Diarrhea, unspecified type [R19.7]  1. Diarrhea, unspecified type          Discussed viral diarrhea going through the community and suspect her case is viral in nature.   Encouraged rest, hydration. Discouraged use of immodium. Consider pepto bismol as needed.   Reviewed need for further evaluation with persistent diarrhea x 2 weeks or more and advised to see PCP in that setting.  To ER with signs of SBO, including new onset N/V and abdominal pain.     Patient Instructions       Follow up with PCP in 3-5 days.  Proceed to  ER if symptoms worsen.    If tests have been performed at [facility], our office will contact you with results if changes need to be made to the care plan discussed with you at the visit.  [facility]    Chief Complaint     Chief Complaint   Patient presents with    Diarrhea     Diarrhea  x 4days.         History of Present Illness       Ashley presents with her  for evaluation of diarrhea x 4 days. She reports feeling chills at the start. Also with fatigue and low energy. She has tried immodium without relief of symptoms.  Decreased appetite, drinking ok. Normal urine output.  Suspects immune system is down due to recent gall bladder procedure(s) and associated life stressors.   Also spent a few days with 4 grandchildren, one of which had diarrhea recently who required diaper changes.  Denies nausea, vomiting, abdominal pain, fever.     Diarrhea   Pertinent negatives include no abdominal pain, coughing, fever or vomiting.       Review of Systems   Review of Systems   Constitutional:  Negative for activity change, appetite change, fatigue and fever.   HENT:  Negative for congestion, ear pain, rhinorrhea, sinus pressure, sinus pain, sneezing, sore throat and trouble swallowing.     Respiratory:  Negative for cough, shortness of breath and wheezing.    Gastrointestinal:  Positive for diarrhea. Negative for abdominal pain, constipation, nausea and vomiting.   Skin:  Negative for rash.         Current Medications     Current Medications[1]    Current Allergies     Allergies as of 06/29/2025 - Reviewed 06/29/2025   Allergen Reaction Noted    Ciprofloxacin Lip Swelling 12/09/2019    Erythromycin Other (See Comments) 12/09/2019    Penicillins Swelling 12/09/2019            The following portions of the patient's history were reviewed and updated as appropriate: allergies, current medications, past family history, past medical history, past social history, past surgical history and problem list.     Past Medical History[2]    Past Surgical History[3]    Family History[4]      Medications have been verified.        Objective   /78   Pulse 64   Temp 98.3 °F (36.8 °C)   Resp 20   Wt 61.7 kg (136 lb)   SpO2 99%   BMI 24.09 kg/m²   No LMP recorded. Patient has had a hysterectomy.       Physical Exam     Physical Exam  Vitals and nursing note reviewed.   Constitutional:       General: She is awake.      Appearance: Normal appearance. She is well-developed, well-groomed and normal weight. She is not ill-appearing.   HENT:      Head: Normocephalic.      Right Ear: External ear normal.      Left Ear: External ear normal.      Nose: Nose normal. No nasal deformity.      Mouth/Throat:      Lips: Pink. No lesions.      Mouth: Mucous membranes are moist.     Eyes:      General: Lids are normal.      Conjunctiva/sclera: Conjunctivae normal.      Pupils: Pupils are equal, round, and reactive to light.     Neck:      Thyroid: No thyromegaly.     Cardiovascular:      Rate and Rhythm: Normal rate and regular rhythm.      Heart sounds: Normal heart sounds. No murmur heard.  Pulmonary:      Effort: Pulmonary effort is normal.      Breath sounds: Normal breath sounds. No decreased breath sounds, wheezing,  rhonchi or rales.   Abdominal:      General: Bowel sounds are increased.      Palpations: Abdomen is soft. There is no hepatomegaly or splenomegaly.      Tenderness: There is no abdominal tenderness. There is no right CVA tenderness, left CVA tenderness, guarding or rebound.      Hernia: No hernia is present. There is no hernia in the umbilical area.     Musculoskeletal:      Cervical back: Normal range of motion and neck supple.   Lymphadenopathy:      Head:      Right side of head: No submandibular, tonsillar, preauricular or posterior auricular adenopathy.      Left side of head: No submandibular, tonsillar, preauricular or posterior auricular adenopathy.      Cervical: No cervical adenopathy.     Skin:     General: Skin is warm and dry.      Capillary Refill: Capillary refill takes less than 2 seconds.      Coloration: Skin is not pale.      Findings: No rash.     Neurological:      Mental Status: She is alert and oriented to person, place, and time.      Comments: CN II-X grossly intact.   Psychiatric:         Speech: Speech normal.         Behavior: Behavior normal. Behavior is cooperative.                        [1]   Current Outpatient Medications:     allopurinol (ZYLOPRIM) 100 mg tablet, TAKE 1 TABLET BY MOUTH EVERY DAY, Disp: 90 tablet, Rfl: 1    amLODIPine (NORVASC) 2.5 mg tablet, Take 1 tablet (2.5 mg total) by mouth daily, Disp: 100 tablet, Rfl: 3    atenolol (TENORMIN) 50 mg tablet, TAKE 1 TABLET BY MOUTH TWICE A DAY, Disp: 180 tablet, Rfl: 1    Biotin 1000 MCG CHEW, Chew in the morning., Disp: , Rfl:     Cholecalciferol (Vitamin D3) 50 MCG (2000 UT) TABS, Take 2,000 Units by mouth in the morning., Disp: , Rfl:     cyanocobalamin (VITAMIN B-12) 1000 MCG tablet, Take 1,000 mcg by mouth in the morning., Disp: , Rfl:     gabapentin (Neurontin) 100 mg capsule, Take 1 capsule (100 mg total) by mouth 3 (three) times a day, Disp: 90 capsule, Rfl: 1    levothyroxine 100 mcg tablet, Take 100 mcg by mouth in  the morning., Disp: , Rfl:     valsartan (DIOVAN) 160 mg tablet, Take 1 tablet (160 mg total) by mouth daily, Disp: 100 tablet, Rfl: 3  [2]   Past Medical History:  Diagnosis Date    Chronic ITP (idiopathic thrombocytopenia) (HCC)     Disease of thyroid gland     Hypertension    [3]   Past Surgical History:  Procedure Laterality Date    CHOLECYSTECTOMY LAPAROSCOPIC N/A 02/26/2025    Procedure: CHOLECYSTECTOMY LAPAROSCOPIC; REPAIR OF SUPRAUMBILICAL HERNIA;  Surgeon: Eduard Salcido DO;  Location: [facility];  Service: General    COLONOSCOPY  10/26/2017    Dr. Guevara - polyp in sigmoid colon removed; moderate diverticulosis in sigmoid colon. Bx: tubular adenoma.    COLONOSCOPY  08/03/2012    Dr. Guevara - polyp found in sigmoid colon removed. Moderately severe diverticulosis in the sigmoid colon. Bx: tubular adenoma.    CYST REMOVAL  2015    Mouth ( Upper) - done by oral surgeon    GANGLION CYST EXCISION Right 1999    Right wrist    HYSTERECTOMY      LUNG LOBECTOMY Right 4/24/2025    Procedure: THORACOSCOPIC RIGHT UPPER LOBE PULMONARY LOBECTOMY WITH MEDIASTINAL LYMPH NODE DISSECTION;  Surgeon: Myles Dominique MD;  [facility];  Service: Thoracic    [facility] W/CELL WASHG SPX N/A 4/24/2025    Procedure: BRONCHOSCOPY FLEXIBLE;  Surgeon: Myles Dominique MD;  [facility];  Service: Thoracic    IN THORACOSCOPY W/LOBECTOMY SINGLE LOBE Right 4/24/2025    Procedure: THORACOSCOPY VIDEO ASSISTED SURGERY (VATS);  Surgeon: Myles Dominique MD;  [facility];  Service: Thoracic    THYROIDECTOMY, PARTIAL Left 2004    papillary carcinoma of the thyroid    TOTAL THYROIDECTOMY Bilateral 2009    Hashimotos   [4]   Family History  Problem Relation Name Age of Onset    Leukemia Mother          chronic lymphocytic leukemia    Hashimoto's thyroiditis Mother      Diabetes type II Mother      Lung cancer Father      No Known Problems Daughter dario     No  Known Problems Maternal Grandmother      No Known Problems Maternal Grandfather      No Known Problems Paternal Grandmother      No Known Problems Paternal Grandfather      No Known Problems Brother      No Known Problems Son      No Known Problems Maternal Aunt      No Known Problems Paternal Aunt      Breast cancer Cousin

## 2025-07-03 DIAGNOSIS — C34.91 NSCLC OF RIGHT LUNG (HCC): ICD-10-CM

## 2025-07-03 RX ORDER — GABAPENTIN 100 MG/1
100 CAPSULE ORAL 3 TIMES DAILY
Qty: 90 CAPSULE | Refills: 1 | Status: SHIPPED | OUTPATIENT
Start: 2025-07-03

## 2025-07-08 ENCOUNTER — OFFICE VISIT (OUTPATIENT)
Dept: FAMILY MEDICINE CLINIC | Facility: CLINIC | Age: 70
End: 2025-07-08
Payer: MEDICARE

## 2025-07-08 VITALS
SYSTOLIC BLOOD PRESSURE: 136 MMHG | HEIGHT: 63 IN | OXYGEN SATURATION: 99 % | BODY MASS INDEX: 25.09 KG/M2 | WEIGHT: 141.6 LBS | TEMPERATURE: 98.9 F | HEART RATE: 64 BPM | DIASTOLIC BLOOD PRESSURE: 80 MMHG

## 2025-07-08 DIAGNOSIS — I10 ESSENTIAL HYPERTENSION: Primary | ICD-10-CM

## 2025-07-08 DIAGNOSIS — C34.11 PRIMARY ADENOCARCINOMA OF UPPER LOBE OF RIGHT LUNG (HCC): ICD-10-CM

## 2025-07-08 DIAGNOSIS — M16.11 PRIMARY OSTEOARTHRITIS OF RIGHT HIP: ICD-10-CM

## 2025-07-08 DIAGNOSIS — Z90.2 S/P LOBECTOMY OF LUNG: ICD-10-CM

## 2025-07-08 DIAGNOSIS — E03.9 ACQUIRED HYPOTHYROIDISM: ICD-10-CM

## 2025-07-08 DIAGNOSIS — D69.3 AUTOIMMUNE THROMBOCYTOPENIA (HCC): ICD-10-CM

## 2025-07-08 PROCEDURE — 99214 OFFICE O/P EST MOD 30 MIN: CPT | Performed by: INTERNAL MEDICINE

## 2025-07-08 PROCEDURE — G2211 COMPLEX E/M VISIT ADD ON: HCPCS | Performed by: INTERNAL MEDICINE

## 2025-07-08 NOTE — ASSESSMENT & PLAN NOTE
Currently in remission status post lobectomy.  Should be a cure.  Getting CAT scans every 6 months at this point.

## 2025-07-08 NOTE — PROGRESS NOTES
Name: Ashley Phillips      : 1955      MRN: [id number]  Encounter Provider: Sulaiman Hernandez DO  Encounter Date: 2025   Encounter department: [facility] PRIMARY CARE  :  Assessment & Plan  Essential hypertension  Patient blood pressure appears well-controlled.  Continue current medical therapy.  Continue to monitor home blood pressures.        Primary osteoarthritis of right hip  Significant discomfort and pain.  Will be checking back in with her surgeon, Dr. Talamantes from [facility],to reschedule.  She is thinking about waiting till the end of summer.       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  Should be a cure.  Getting CAT scans every 6 months at this point.       S/P lobectomy of lung  No complications of shortness of breath associated with this.  Did have postoperative fusion and again a post operative seroma which seems to have resolved.       Acquired hypothyroidism  Continues on replacement periodic monitoring.       Autoimmune thrombocytopenia (HCC)  Labile platelet count, last platelet count within normal limits.              History of Present Illness   Patient is doing very well.  Recent issues with a lump around her surgical site.  Saw the thoracic surgeon PA, did not really make anything other.  Appears to have been a seroma.  It is resolved now.  She has no chest pain to speak of no shortness of breath.  She did have slight shortness of breath immediately after surgery but is now back up to her usual conditioning.    She is now complaining of her right hip again.  This has been a persistent problem and actually the preop evaluation for the right hip surgery is what led to her diagnosis of an abnormal lung x-ray and resultant diagnosis of cancer followed by lobectomy.    Continues on Synthroid, notably is on biotin discussed interaction between the two.    Currently appetite is good, bowels and bladder are good.  No  "recent falls.  Dealing with the pain at this point.      Review of Systems   Constitutional:  Negative for chills and fever.   HENT:  Negative for rhinorrhea and sore throat.    Eyes:  Negative for visual disturbance.   Respiratory:  Negative for cough and shortness of breath.    Cardiovascular:  Negative for chest pain and leg swelling.   Gastrointestinal:  Negative for abdominal pain, diarrhea, nausea and vomiting.   Genitourinary:  Negative for dysuria.   Musculoskeletal:  Positive for arthralgias, back pain and gait problem. Negative for myalgias.        Right hip pain   Skin:  Negative for rash.   Neurological:  Negative for dizziness and headaches.   Psychiatric/Behavioral:  Negative for confusion.    All other systems reviewed and are negative.      Objective   /80 (BP Location: Left arm, Patient Position: Sitting, Cuff Size: Adult)   Pulse 64   Temp 98.9 °F (37.2 °C) (Tympanic)   Ht 5' 3\" (1.6 m)   Wt 64.2 kg (141 lb 9.6 oz)   SpO2 99%   BMI 25.08 kg/m²      Physical Exam  Vitals and nursing note reviewed.   Constitutional:       General: She is not in acute distress.     Appearance: Normal appearance. She is well-developed.   HENT:      Head: Normocephalic and atraumatic.     Eyes:      Conjunctiva/sclera: Conjunctivae normal.       Cardiovascular:      Rate and Rhythm: Normal rate and regular rhythm.      Pulses: Normal pulses.      Heart sounds: Normal heart sounds. No murmur heard.  Pulmonary:      Effort: Pulmonary effort is normal. No respiratory distress.      Breath sounds: Normal breath sounds.   Abdominal:      Palpations: Abdomen is soft.      Tenderness: There is no abdominal tenderness.     Musculoskeletal:         General: Tenderness present. No swelling.      Cervical back: Neck supple.      Comments: Antalgic gait, right hip pain.  Limp.     Skin:     General: Skin is warm and dry.      Capillary Refill: Capillary refill takes less than 2 seconds.     Neurological:      General: " No focal deficit present.      Mental Status: She is alert and oriented to person, place, and time.     Psychiatric:         Mood and Affect: Mood normal.

## 2025-07-08 NOTE — ASSESSMENT & PLAN NOTE
No complications of shortness of breath associated with this.  Did have postoperative fusion and again a post operative seroma which seems to have resolved.

## 2025-07-08 NOTE — ASSESSMENT & PLAN NOTE
Significant discomfort and pain.  Will be checking back in with her surgeon, Dr. Talamantes from [facility],to reschedule.  She is thinking about waiting till the end of summer.

## 2025-07-11 ENCOUNTER — TELEPHONE (OUTPATIENT)
Age: 70
End: 2025-07-11

## 2025-07-11 NOTE — TELEPHONE ENCOUNTER
Patient called in stating that she was supposed to have hip surgery in April but that had to get cancelled. She wanted to let Dr. Hernandez know that she has to see Dr. Bennett in October and then they have to start all over again

## 2025-07-16 ENCOUNTER — TELEPHONE (OUTPATIENT)
Age: 70
End: 2025-07-16

## 2025-07-16 NOTE — TELEPHONE ENCOUNTER
Received a phone call from patient.  Patient received a letter about [employer] results.  Patient expressed frustration that she was unaware of this test being done and concerned about insurance not paying for it.  Patient does not understand the results and would like to discuss.  Please call patient to discuss.

## 2025-07-16 NOTE — TELEPHONE ENCOUNTER
Called patient's home number. Did not .  Left voicemail.  Then I proceeded to call patient's mobile number and reached the patient.  Discussed that [employer] testing gets reflexively sent on all lung cancer specimens from [facility].  Patient reports the letter she received was from the [employer] company and not her insurance.  The letter did not list [facility] or any of her medical providers so she wanted some clarity on the information.  All questions answered.  Patient appreciative of call.

## 2025-07-28 ENCOUNTER — APPOINTMENT (OUTPATIENT)
Dept: LAB | Facility: MEDICAL CENTER | Age: 70
End: 2025-07-28
Attending: INTERNAL MEDICINE
Payer: MEDICARE

## 2025-07-28 DIAGNOSIS — D69.6 THROMBOPENIA (HCC): ICD-10-CM

## 2025-07-28 LAB
BASOPHILS # BLD AUTO: 0.05 THOUSANDS/ÂΜL (ref 0–0.1)
BASOPHILS NFR BLD AUTO: 1 % (ref 0–1)
EOSINOPHIL # BLD AUTO: 0.36 THOUSAND/ÂΜL (ref 0–0.61)
EOSINOPHIL NFR BLD AUTO: 8 % (ref 0–6)
ERYTHROCYTE [DISTWIDTH] IN BLOOD BY AUTOMATED COUNT: 13.7 % (ref 11.6–15.1)
HCT VFR BLD AUTO: 38 % (ref 34.8–46.1)
HGB BLD-MCNC: 12.2 G/DL (ref 11.5–15.4)
IMM GRANULOCYTES # BLD AUTO: 0.01 THOUSAND/UL (ref 0–0.2)
IMM GRANULOCYTES NFR BLD AUTO: 0 % (ref 0–2)
LYMPHOCYTES # BLD AUTO: 1.57 THOUSANDS/ÂΜL (ref 0.6–4.47)
LYMPHOCYTES NFR BLD AUTO: 34 % (ref 14–44)
MCH RBC QN AUTO: 29 PG (ref 26.8–34.3)
MCHC RBC AUTO-ENTMCNC: 32.1 G/DL (ref 31.4–37.4)
MCV RBC AUTO: 91 FL (ref 82–98)
MONOCYTES # BLD AUTO: 0.51 THOUSAND/ÂΜL (ref 0.17–1.22)
MONOCYTES NFR BLD AUTO: 11 % (ref 4–12)
NEUTROPHILS # BLD AUTO: 2.08 THOUSANDS/ÂΜL (ref 1.85–7.62)
NEUTS SEG NFR BLD AUTO: 46 % (ref 43–75)
NRBC BLD AUTO-RTO: 0 /100 WBCS
PLATELET # BLD AUTO: 89 THOUSANDS/UL (ref 149–390)
PLATELET BLD QL SMEAR: ABNORMAL
PMV BLD AUTO: 11.6 FL (ref 8.9–12.7)
RBC # BLD AUTO: 4.2 MILLION/UL (ref 3.81–5.12)
RBC MORPH BLD: NORMAL
WBC # BLD AUTO: 4.58 THOUSAND/UL (ref 4.31–10.16)

## 2025-07-28 PROCEDURE — 36415 COLL VENOUS BLD VENIPUNCTURE: CPT

## 2025-07-28 PROCEDURE — 85025 COMPLETE CBC W/AUTO DIFF WBC: CPT

## 2025-08-03 DIAGNOSIS — I10 ESSENTIAL HYPERTENSION: ICD-10-CM

## 2025-08-06 RX ORDER — ATENOLOL 50 MG/1
50 TABLET ORAL 2 TIMES DAILY
Qty: 180 TABLET | Refills: 1 | Status: SHIPPED | OUTPATIENT
Start: 2025-08-06

## (undated) DEVICE — GLOVE PI ULTRA TOUCH SZ.7.0

## (undated) DEVICE — FIRST STEP BEDSIDE KIT - STAND-UP POUCH, ENDOSCOPIC CLEANING PAD - 1 POUCH: Brand: FIRST STEP BEDSIDE KIT - STAND-UP POUCH, ENDOSCOPIC CLEANING PAD

## (undated) DEVICE — NEEDLE 25G X 1 1/2

## (undated) DEVICE — SUT VICRYL 2-0 SH 27 IN UNDYED J417H

## (undated) DEVICE — SUT MONOCRYL 4-0 PS-2 18 IN Y496G

## (undated) DEVICE — EXOFIN PRECISION PEN HIGH VISCOSITY TOPICAL SKIN ADHESIVE: Brand: EXOFIN PRECISION PEN, 1G

## (undated) DEVICE — PACK PBDS LAP CHOLE RF

## (undated) DEVICE — REM POLYHESIVE ADULT PATIENT RETURN ELECTRODE: Brand: VALLEYLAB

## (undated) DEVICE — TUBING SMOKE EVAC W/FILTRATION DEVICE PLUMEPORT ACTIV

## (undated) DEVICE — ENDOPOUCH RETRIEVER SPECIMEN RETRIEVAL BAGS: Brand: ENDOPOUCH RETRIEVER

## (undated) DEVICE — HEMOSTATIC MATRIX SURGIFLO 8ML W/THROMBIN

## (undated) DEVICE — SUT MONOCRYL 4-0 PS-2 27 IN Y426H

## (undated) DEVICE — 3M™ STERI-STRIP™ REINFORCED ADHESIVE SKIN CLOSURES, R1546, 1/4 IN X 4 IN (6 MM X 100 MM), 10 STRIPS/ENVELOPE: Brand: 3M™ STERI-STRIP™

## (undated) DEVICE — SINGLE USE SUCTION VALVE MAJ-209: Brand: SINGLE USE SUCTION VALVE (STERILE)

## (undated) DEVICE — SCD SEQUENTIAL COMPRESSION COMFORT SLEEVE MEDIUM KNEE LENGTH: Brand: KENDALL SCD

## (undated) DEVICE — Device: Brand: TISSUE RETRIEVAL SYSTEM

## (undated) DEVICE — GLOVE INDICATOR PI UNDERGLOVE SZ 7 BLUE

## (undated) DEVICE — ANTIBACTERIAL UNDYED BRAIDED (POLYGLACTIN 910), SYNTHETIC ABSORBABLE SUTURE: Brand: COATED VICRYL

## (undated) DEVICE — UNIVERSAL STAPLER: Brand: ENDO GIA ULTRA

## (undated) DEVICE — SURGICEL 2 X 3

## (undated) DEVICE — INTENDED FOR TISSUE SEPARATION, AND OTHER PROCEDURES THAT REQUIRE A SHARP SURGICAL BLADE TO PUNCTURE OR CUT.: Brand: BARD-PARKER SAFETY BLADES SIZE 15, STERILE

## (undated) DEVICE — TROCAR: Brand: KII SLEEVE

## (undated) DEVICE — LIGAMAX 5 MM ENDOSCOPIC MULTIPLE CLIP APPLIER: Brand: LIGAMAX

## (undated) DEVICE — 3M™ TEGADERM™ TRANSPARENT FILM DRESSING FRAME STYLE, 1624W, 2-3/8 IN X 2-3/4 IN (6 CM X 7 CM), 100/CT 4CT/CASE: Brand: 3M™ TEGADERM™

## (undated) DEVICE — MASTISOL LIQ ADHESIVE 2/3ML

## (undated) DEVICE — GAUZE SPONGES,8 PLY: Brand: CURITY

## (undated) DEVICE — SUT PDS II 1 CT-1 27 IN Z347H

## (undated) DEVICE — CHLORAPREP HI-LITE 26ML ORANGE

## (undated) DEVICE — METZENBAUM ADTEC SINGLE USE DISSECTING SCISSORS, SHAFT ONLY, MONOPOLAR, CURVED TO LEFT, WORKING LENGTH: 12 1/4", (310 MM), DIAM. 5 MM, INSULATED, DOUBLE ACTION, STERILE, DISPOSABLE, PACKAGE OF 10 PIECES: Brand: AESCULAP

## (undated) DEVICE — SINGLE USE BIOPSY VALVE MAJ-210: Brand: SINGLE USE BIOPSY VALVE (STERILE)

## (undated) DEVICE — SUT VICRYL 0 CT-1 27 IN J260H

## (undated) DEVICE — PACK CUSTOM THORACIC RF

## (undated) DEVICE — PLUMEPEN PRO 10FT

## (undated) DEVICE — CURVED TIP INTELLIGENT RELOAD AND INTRODUCER: Brand: TRI-STAPLE 2.0

## (undated) DEVICE — ENDOPATH 5 MM GRASPERS WITH RATCHET HANDLES: Brand: ENDOPATH

## (undated) DEVICE — GLOVE INDICATOR PI UNDERGLOVE SZ 8 BLUE

## (undated) DEVICE — ARTICULATION RELOAD WITH TRI-STAPLE TECHNOLOGY: Brand: ENDO GIA

## (undated) DEVICE — PAD GROUNDING DUAL ADULT

## (undated) DEVICE — ELECTRODE EXTENDER STD 3/32 CONNECTOR 20 X 10MM STRL

## (undated) DEVICE — WOUND RETRACTOR AND PROTECTOR: Brand: ALEXIS WOUND PROTECTOR-RETRACTOR

## (undated) DEVICE — GLOVE SRG BIOGEL ECLIPSE 7.5

## (undated) DEVICE — GAUZE SPONGES,16 PLY: Brand: CURITY

## (undated) DEVICE — TROCARS: Brand: KII® BALLOON BLUNT TIP SYSTEM

## (undated) DEVICE — ELECTRODE LAP L WIRE E-Z CLEAN 33CM -0100

## (undated) DEVICE — INTENDED FOR TISSUE SEPARATION, AND OTHER PROCEDURES THAT REQUIRE A SHARP SURGICAL BLADE TO PUNCTURE OR CUT.: Brand: BARD-PARKER SAFETY BLADES SIZE 10, STERILE

## (undated) DEVICE — SUT PROLENE 0 CT-1 30 IN 8424H

## (undated) DEVICE — 24 FR STRAIGHT – SOFT PVC CATHETER: Brand: PVC THORACIC CATHETERS

## (undated) DEVICE — SUT VICRYL 0 UR-6 27 IN J603H

## (undated) DEVICE — 5 MM CURVED DISSECTORS WITH MONOPOLAR CAUTERY: Brand: ENDOPATH